# Patient Record
Sex: MALE | Race: WHITE | Employment: FULL TIME | URBAN - METROPOLITAN AREA
[De-identification: names, ages, dates, MRNs, and addresses within clinical notes are randomized per-mention and may not be internally consistent; named-entity substitution may affect disease eponyms.]

---

## 2017-09-13 ENCOUNTER — OFFICE VISIT (OUTPATIENT)
Dept: FAMILY MEDICINE | Facility: CLINIC | Age: 67
End: 2017-09-13
Payer: MEDICARE

## 2017-09-13 VITALS
BODY MASS INDEX: 31.5 KG/M2 | SYSTOLIC BLOOD PRESSURE: 123 MMHG | HEART RATE: 59 BPM | HEIGHT: 70 IN | TEMPERATURE: 96.9 F | DIASTOLIC BLOOD PRESSURE: 75 MMHG | WEIGHT: 220 LBS

## 2017-09-13 DIAGNOSIS — Z23 NEED FOR PROPHYLACTIC VACCINATION AND INOCULATION AGAINST INFLUENZA: ICD-10-CM

## 2017-09-13 DIAGNOSIS — Z00.00 ROUTINE HISTORY AND PHYSICAL EXAMINATION OF ADULT: Primary | ICD-10-CM

## 2017-09-13 LAB
CHOLEST SERPL-MCNC: 231 MG/DL
GLUCOSE SERPL-MCNC: 97 MG/DL (ref 70–99)
HDLC SERPL-MCNC: 38 MG/DL
LDLC SERPL CALC-MCNC: 137 MG/DL
NONHDLC SERPL-MCNC: 193 MG/DL
TRIGL SERPL-MCNC: 278 MG/DL

## 2017-09-13 PROCEDURE — 36415 COLL VENOUS BLD VENIPUNCTURE: CPT | Performed by: FAMILY MEDICINE

## 2017-09-13 PROCEDURE — 90662 IIV NO PRSV INCREASED AG IM: CPT | Performed by: FAMILY MEDICINE

## 2017-09-13 PROCEDURE — 80061 LIPID PANEL: CPT | Performed by: FAMILY MEDICINE

## 2017-09-13 PROCEDURE — G0438 PPPS, INITIAL VISIT: HCPCS | Performed by: FAMILY MEDICINE

## 2017-09-13 PROCEDURE — G0008 ADMIN INFLUENZA VIRUS VAC: HCPCS | Performed by: FAMILY MEDICINE

## 2017-09-13 PROCEDURE — 82947 ASSAY GLUCOSE BLOOD QUANT: CPT | Performed by: FAMILY MEDICINE

## 2017-09-13 NOTE — PROGRESS NOTES
SUBJECTIVE:   David lCark is a 67 year old male who presents for Preventive Visit.    Are you in the first 12 months of your Medicare Part B coverage?  No    Healthy Habits:    Do you get at least three servings of calcium containing foods daily (dairy, green leafy vegetables, etc.)? Taking an OTC medication to supplement-Probiotic.  Milk and cheese daily.    Amount of exercise or daily activities, outside of work: Back and core exercises, weights,treadmill.  None in the past 3 weeks due to his back pain.     Problems taking medications regularly No    Medication side effects: No    Have you had an eye exam in the past two years? no    Do you see a dentist twice per year? yes    Do you have sleep apnea, excessive snoring or daytime drowsiness?no    COGNITIVE SCREEN  1) Repeat 3 items (Banana, Sunrise, Chair)    2) Clock draw: NORMAL  3) 3 item recall: Recalls 1 object   Results: NORMAL clock, 1-2 items recalled: COGNITIVE IMPAIRMENT LESS LIKELY    Mini-CogTM Copyright S Corey. Licensed by the author for use in [facility]; reprinted with permission (travis@[email or web address]). All rights reserved.        Chief Complaint   Patient presents with     Physical     Medicare wellness exam.     Lipids     He is fasting today for labs if needed.     Reviewed and updated as needed this visit by clinical staff  Tobacco  Allergies  Med Hx  Surg Hx  Fam Hx  Soc Hx      Reviewed and updated as needed this visit by Provider      Current Outpatient Prescriptions:      NEW MED, Taking OTC medications that include Apple Vinegar, Med for his Sinus, Gummy Bears., Disp: , Rfl:      ketoconazole (NIZORAL) 2 % shampoo, Apply to the affected area and wash off after 5 minutes daily for first 2 weeks., Disp: 240 mL, Rfl: 11     econazole nitrate 1 % cream, Apply topically 2 times daily, Disp: 60 g, Rfl: 3     sildenafil (VIAGRA) 100 MG tablet, Take 0.5-1 tablets ( mg) by mouth daily as needed for erectile dysfunction Take 30  min to 4 hours before intercourse., Disp: 6 tablet, Rfl: 11     ibuprofen (ADVIL,MOTRIN) 200 MG tablet, Take 400 mg by mouth every 4 hours as needed for mild pain, Disp: , Rfl:      Glucosamine-Chondroit-Vit C-Mn (GLUCOSAMINE 1500 COMPLEX) CAPS, Take  by mouth., Disp: , Rfl:      aspirin 81 MG tablet, Take 81 mg by mouth daily Patient takes 1 tablet every other day, Disp: 30 tablet, Rfl:      Multiple Vitamin (MULTI-VITAMIN) per tablet, Take 1 tablet by mouth daily., Disp: , Rfl:      fish oil-omega-3 fatty acids (FISH OIL) 1000 MG capsule, Take 2-4 capsules by mouth daily., Disp: , Rfl:           Social History   Substance Use Topics     Smoking status: Never Smoker     Smokeless tobacco: Never Used     Alcohol use Yes      Comment: occassionally       The patient does not drink >3 drinks per day nor >7 drinks per week.    Today's PHQ-2 Score:   PHQ-2 ( 1999 Pf[employer]) 9/13/2017 7/29/2016   Q1: Little interest or pleasure in doing things 0 1   Q2: Feeling down, depressed or hopeless 0 1   PHQ-2 Score 0 2       Do you feel safe in your environment - Yes    Do you have a Health Care Directive?: Yes: Advance Directive has been received and scanned.    Current providers sharing in care for this patient include:   Patient Care Team:  Gal Robison MD as PCP - General (Family Practice)      Hearing impairment: No    Ability to successfully perform activities of daily living: Yes, no assistance needed     Fall risk:  Fallen 2 or more times in the past year?: Yes  Any fall with injury in the past year?: No  Timed Up and Go Test (>13.5 is fall risk; contact physician) : 9      Home safety:  lack of grab bars in the bathroom      The following health maintenance items are reviewed in Epic and correct as of today:  Health Maintenance   Topic Date Due     HEPATITIS C SCREENING  07/31/1969     FALL RISK ASSESSMENT  07/31/2016     AORTIC ANEURYSM SCREENING (SYSTEM ASSIGNED)  07/31/2016     PNEUMOCOCCAL (2 of 2 -  "PPSV23) 07/29/2017     INFLUENZA VACCINE (SYSTEM ASSIGNED)  09/01/2017     ADVANCE DIRECTIVE PLANNING Q5 YRS  05/29/2018     TETANUS IMMUNIZATION (SYSTEM ASSIGNED)  09/23/2020     LIPID SCREEN Q5 YR MALE (SYSTEM ASSIGNED)  07/29/2021     COLON CANCER SCREEN (SYSTEM ASSIGNED)  08/16/2026     Labs reviewed in EPIC    ROS:  C: NEGATIVE for fever, chills, change in weight  I: NEGATIVE for worrisome rashes, moles or lesions  E: NEGATIVE for vision changes or irritation  E/M: NEGATIVE for ear, mouth and throat problems  R: NEGATIVE for significant cough or SOB  B: NEGATIVE for masses, tenderness or discharge  CV: NEGATIVE for chest pain, palpitations or peripheral edema  GI: NEGATIVE for nausea, abdominal pain, heartburn, or change in bowel habits  : NEGATIVE for frequency, dysuria, or hematuria  M: NEGATIVE for significant arthralgias or myalgia  N: NEGATIVE for weakness, dizziness or paresthesias  E: NEGATIVE for temperature intolerance, skin/hair changes  H: NEGATIVE for bleeding problems  P: NEGATIVE for changes in mood or affect    OBJECTIVE:   /75  Pulse 59  Temp 96.9  F (36.1  C) (Tympanic)  Ht 5' 9.75\" (1.772 m)  Wt 220 lb (99.8 kg)  BMI 31.79 kg/m2 Estimated body mass index is 31.79 kg/(m^2) as calculated from the following:    Height as of this encounter: 5' 9.75\" (1.772 m).    Weight as of this encounter: 220 lb (99.8 kg).GENERAL APPEARANCE: healthy, alert and no distress  EYES: EOMI,  PERRL  HENT: ear canals and TM's normal and nose and mouth without ulcers or lesions  NECK: no adenopathy, no asymmetry, masses, or scars and thyroid normal to palpation  RESP: lungs clear to auscultation - no rales, rhonchi or wheezes  CV: regular rates and rhythm, normal S1 S2, no S3 or S4 and no murmur, click or rub -  ABDOMEN:  soft, nontender, no HSM or masses and bowel sounds normal  GU_male: testicles normal without atrophy or masses, no hernias and penis normal without urethral discharge  MS: extremities " "normal- no gross deformities noted, no evidence of inflammation in joints, FROM in all extremities.  SKIN: no suspicious lesions or rashes  NEURO: Normal strength and tone, sensory exam grossly normal, mentation intact and speech normal  PSYCH: mentation appears normal and affect normal/bright  LYMPHATICS: No axillary, cervical, inguinal, or supraclavicular nodes      ASSESSMENT / PLAN:   1. Routine history and physical examination of adult  - NEW MED; Taking OTC medications that include Apple Vinegar, Med for his Sinus, Gummy Bears.  - Glucose  - Lipid panel reflex to direct LDL    End of Life Planning:  Patient currently has an advanced directive: Yes.  Practitioner is supportive of decision.  COUNSELING:  Reviewed preventive health counseling, as reflected in patient instructions       Regular exercise       Healthy diet/nutrition       Vision screening       Hearing screening  Estimated body mass index is 31.79 kg/(m^2) as calculated from the following:    Height as of this encounter: 5' 9.75\" (1.772 m).    Weight as of this encounter: 220 lb (99.8 kg).     reports that he has never smoked. He has never used smokeless tobacco.    Appropriate preventive services were discussed with this patient, including applicable screening as appropriate for cardiovascular disease, diabetes, osteopenia/osteoporosis, and glaucoma.  As appropriate for age/gender, discussed screening for colorectal cancer, prostate cancer, breast cancer, and cervical cancer. Checklist reviewing preventive services available has been given to the patient.    Reviewed patients plan of care and provided an AVS. The Basic Care Plan (routine screening as documented in Health Maintenance) for David meets the Care Plan requirement. This Care Plan has been established and reviewed with the Patient.    Counseling Resources:  ATP IV Guidelines  Pooled Cohorts Equation Calculator  Breast Cancer Risk Calculator  FRAX Risk Assessment  ICSI Preventive " Guidelines  Dietary Guidelines for Americans, 2010  USDA's MyPlate  ASA Prophylaxis  Lung CA Screening    Steve Patterson MD  [facility]

## 2017-09-13 NOTE — PROGRESS NOTES
Injectable Influenza Immunization Documentation    1.  Are you sick today? (Fever of 100.5 or higher on the day of the clinic)   No    2.  Have you ever had Guillain-[place] Syndrome within 6 weeks of an influenza vaccionation?  No    3. Do you have a life-threatening allergy to eggs?  No    4. Do you have a life-threatening allergy to a component of the vaccine? May include antibiotics, gelatin or latex.  No     5. Have you ever had a reaction to a dose of flu vaccine that needed immediate medical attention?  No     Form completed by Aura Santana CMA

## 2017-09-13 NOTE — LETTER
September 14, 2017      David Clark  [address] [address] [address]        Dear ,    We are writing to inform you of your test results.    The glucose is now normal and the lipids are about the same, being mildly high.   Stay on the lower carb and lipid diets.    Component      Latest Ref Rng & Units 9/13/2017   Cholesterol      <200 mg/dL 231 (H)   Triglycerides      <150 mg/dL 278 (H)   HDL Cholesterol      >39 mg/dL 38 (L)   LDL Cholesterol Calculated      <100 mg/dL 137 (H)   Non HDL Cholesterol      <130 mg/dL 193 (H)   Glucose      70 - 99 mg/dL 97     If you have any questions or concerns, please call the clinic at the number listed above.       Sincerely,        Steve Patterson MD

## 2017-09-13 NOTE — NURSING NOTE
"Chief Complaint   Patient presents with     Physical     Medicare wellness exam.     Lipids     He is fasting today for labs if needed.       Initial /75  Pulse 59  Temp 96.9  F (36.1  C) (Tympanic)  Ht 5' 9.75\" (1.772 m)  Wt 220 lb (99.8 kg)  BMI 31.79 kg/m2 Estimated body mass index is 31.79 kg/(m^2) as calculated from the following:    Height as of this encounter: 5' 9.75\" (1.772 m).    Weight as of this encounter: 220 lb (99.8 kg).  Medication Reconciliation: complete  "

## 2017-09-13 NOTE — MR AVS SNAPSHOT
After Visit Summary   9/13/2017    David Clark    MRN: [id number]           Patient Information     Date Of Birth          1950        Visit Information        Provider Department      9/13/2017 10:00 AM Steve Patterson MD [facility]        Today's Diagnoses     Routine history and physical examination of adult    -  1      Care Instructions      Preventive Health Recommendations:   Male Ages 65 and over    Yearly exam:             See your health care provider every year in order to  o   Review health changes.   o   Discuss preventive care.    o   Review your medicines if your doctor has prescribed any.    Talk with your health care provider about whether you should have a test to screen for prostate cancer (PSA).    Every 3 years, have a diabetes test (fasting glucose). If you are at risk for diabetes, you should have this test more often.    Every 5 years, have a cholesterol test. Have this test more often if you are at risk for high cholesterol or heart disease.     Every 10 years, have a colonoscopy. Or, have a yearly FIT test (stool test). These exams will check for colon cancer.    Talk to with your health care provider about screening for Abdominal Aortic Aneurysm if you have a family history of AAA or have a history of smoking.    Shots:     Get a flu shot each year.     Get a tetanus shot every 10 years.     Talk to your doctor about your pneumonia vaccines. There are now two you should receive - Pneumovax (PPSV 23) and Prevnar (PCV 13).     Talk to your doctor about a shingles vaccine.     Talk to your doctor about the hepatitis B vaccine.  Nutrition:     Eat at least 5 servings of fruits and vegetables each day.     Eat whole-grain bread, whole-wheat pasta and brown rice instead of white grains and rice.     Talk to your provider about Calcium and Vitamin D.   Lifestyle    Exercise for at least 150 minutes a week (30 minutes a day, 5 days a week). This will help  "you control your weight and prevent disease.     Limit alcohol to one drink per day.     No smoking.     Wear sunscreen to prevent skin cancer.     See your dentist every six months for an exam and cleaning.     See your eye doctor every 1 to 2 years to screen for conditions such as glaucoma, macular degeneration, cataracts, etc           Thank you for choosing [facility].  You may be receiving a survey in the mail from [employer] regarding your visit today.  Please take a few minutes to complete and return the survey to let us know how we are doing.      If you have questions or concerns, please contact us via [employer] or you can contact your care team at [phone].    Our Clinic hours are:  Monday 6:40 am  to 7:00 pm  Tuesday -Friday 6:40 am to 5:00 pm    The [place] outpatient lab hours are:  Monday - Friday 6:10 am to 4:45 pm  Saturdays 7:00 am to 11:00 am  Appointments are required, call [phone]    If you have clinical questions after hours or would like to schedule an appointment,  call the clinic at [phone].    ASSESSMENT / PLAN:   1. Routine history and physical examination of adult  - NEW MED; Taking OTC medications that include Apple Vinegar, Med for his Sinus, Gummy Bears.  - Glucose  - Lipid panel reflex to direct LDL    End of Life Planning:  Patient currently has an advanced directive: Yes.  Practitioner is supportive of decision.  COUNSELING:  Reviewed preventive health counseling, as reflected in patient instructions       Regular exercise       Healthy diet/nutrition       Vision screening       Hearing screening  Estimated body mass index is 31.79 kg/(m^2) as calculated from the following:    Height as of this encounter: 5' 9.75\" (1.772 m).    Weight as of this encounter: 220 lb (99.8 kg).     reports that he has never smoked. He has never used smokeless tobacco.    Appropriate preventive services were discussed with this patient, including applicable screening as appropriate for " cardiovascular disease, diabetes, osteopenia/osteoporosis, and glaucoma.  As appropriate for age/gender, discussed screening for colorectal cancer, prostate cancer, breast cancer, and cervical cancer. Checklist reviewing preventive services available has been given to the patient.    Reviewed patients plan of care and provided an AVS. The Basic Care Plan (routine screening as documented in Health Maintenance) for David meets the Care Plan requirement. This Care Plan has been established and reviewed with the Patient.    Counseling Resources:  ATP IV Guidelines  Pooled Cohorts Equation Calculator  Breast Cancer Risk Calculator  FRAX Risk Assessment  ICSI Preventive Guidelines  Dietary Guidelines for Americans, 2010  USDA's MyPlate  ASA Prophylaxis  Lung CA Screening            Follow-ups after your visit        Who to contact     If you have questions or need follow up information about today's clinic visit or your schedule please contact [facility] directly at [phone].  Normal or non-critical lab and imaging results will be communicated to you by [employer], letter or phone within 4 business days after the clinic has received the results. If you do not hear from us within 7 days, please contact the clinic through [employer] or phone. If you have a critical or abnormal lab result, we will notify you by phone as soon as possible.  Submit refill requests through [employer] or call your pharmacy and they will forward the refill request to us. Please allow 3 business days for your refill to be completed.          Additional Information About Your Visit        [employer] Information     [employer] gives you secure access to your electronic health record. If you see a primary care provider, you can also send messages to your care team and make appointments. If you have questions, please call your primary care clinic.  If you do not have a primary care provider, please call [phone] and they will assist you.       "  Care EveryWhere ID     This is your Care EveryWhere ID. This could be used by other organizations to access your [place] medical records  [id number]        Your Vitals Were     Pulse Temperature Height BMI (Body Mass Index)          59 96.9  F (36.1  C) (Tympanic) 5' 9.75\" (1.772 m) 31.79 kg/m2         Blood Pressure from Last 3 Encounters:   09/13/17 123/75   09/26/16 126/74   08/16/16 110/66    Weight from Last 3 Encounters:   09/13/17 220 lb (99.8 kg)   08/16/16 219 lb (99.3 kg)   07/29/16 220 lb (99.8 kg)              We Performed the Following     Glucose     Lipid panel reflex to direct LDL        Primary Care Provider Office Phone # Fax #    Gal Robison [phone] [address] [facility] [address]        Equal Access to Services     KENDAL BUSCH : Hadii graciela Chawla, wanatalia gonzalez, qaybleroy kaalmada destin, iban frye . So [facility] [phone].    ATENCIÓN: Si zulmala español, tiene a delgadillo disposición servicios gratuitos de asistencia lingüística. Llame al [phone].    We comply with applicable federal civil rights laws and [place] laws. We do not discriminate on the basis of race, color, national origin, age, disability sex, sexual orientation or gender identity.            Thank you!     Thank you for choosing [facility]  for your care. Our goal is always to provide you with excellent care. Hearing back from our patients is one way we can continue to improve our services. Please take a few minutes to complete the written survey that you may receive in the mail after your visit with us. Thank you!             Your Updated Medication List - Protect others around you: Learn how to safely use, store and throw away your medicines at www.disposemymeds.org.          This list is accurate as of: 9/13/17 11:12 AM.  Always use your most recent med list.                   Brand Name Dispense Instructions " for use Diagnosis    aspirin 81 MG tablet     30 tablet    Take 81 mg by mouth daily Patient takes 1 tablet every other day        econazole nitrate 1 % cream     60 g    Apply topically 2 times daily    TV (tinea versicolor)       fish oil-omega-3 fatty acids 1000 MG capsule      Take 2-4 capsules by mouth daily.        GLUCOSAMINE 1500 COMPLEX Caps      Take  by mouth.        ibuprofen 200 MG tablet    ADVIL/MOTRIN     Take 400 mg by mouth every 4 hours as needed for mild pain        ketoconazole 2 % shampoo    NIZORAL    240 mL    Apply to the affected area and wash off after 5 minutes daily for first 2 weeks.    TV (tinea versicolor)       Multi-vitamin Tabs tablet   Generic drug:  multivitamin, therapeutic with minerals      Take 1 tablet by mouth daily.        NEW MED      Taking OTC medications that include Apple Vinegar, Med for his Sinus, Gummy Bears.    Routine history and physical examination of adult       sildenafil 100 MG cap/tab    REVATIO/VIAGRA    6 tablet    Take 0.5-1 tablets ( mg) by mouth daily as needed for erectile dysfunction Take 30 min to 4 hours before intercourse.    Erectile dysfunction, unspecified erectile dysfunction type

## 2017-09-13 NOTE — PATIENT INSTRUCTIONS
Preventive Health Recommendations:   Male Ages 65 and over    Yearly exam:             See your health care provider every year in order to  o   Review health changes.   o   Discuss preventive care.    o   Review your medicines if your doctor has prescribed any.    Talk with your health care provider about whether you should have a test to screen for prostate cancer (PSA).    Every 3 years, have a diabetes test (fasting glucose). If you are at risk for diabetes, you should have this test more often.    Every 5 years, have a cholesterol test. Have this test more often if you are at risk for high cholesterol or heart disease.     Every 10 years, have a colonoscopy. Or, have a yearly FIT test (stool test). These exams will check for colon cancer.    Talk to with your health care provider about screening for Abdominal Aortic Aneurysm if you have a family history of AAA or have a history of smoking.    Shots:     Get a flu shot each year.     Get a tetanus shot every 10 years.     Talk to your doctor about your pneumonia vaccines. There are now two you should receive - Pneumovax (PPSV 23) and Prevnar (PCV 13).     Talk to your doctor about a shingles vaccine.     Talk to your doctor about the hepatitis B vaccine.  Nutrition:     Eat at least 5 servings of fruits and vegetables each day.     Eat whole-grain bread, whole-wheat pasta and brown rice instead of white grains and rice.     Talk to your provider about Calcium and Vitamin D.   Lifestyle    Exercise for at least 150 minutes a week (30 minutes a day, 5 days a week). This will help you control your weight and prevent disease.     Limit alcohol to one drink per day.     No smoking.     Wear sunscreen to prevent skin cancer.     See your dentist every six months for an exam and cleaning.     See your eye doctor every 1 to 2 years to screen for conditions such as glaucoma, macular degeneration, cataracts, etc           Thank you for choosing [facility].  You  "may be receiving a survey in the mail from [employer] regarding your visit today.  Please take a few minutes to complete and return the survey to let us know how we are doing.      If you have questions or concerns, please contact us via [employer] or you can contact your care team at [phone].    Our Clinic hours are:  Monday 6:40 am  to 7:00 pm  Tuesday -Friday 6:40 am to 5:00 pm    The [place] outpatient lab hours are:  Monday - Friday 6:10 am to 4:45 pm  Saturdays 7:00 am to 11:00 am  Appointments are required, call [phone]    If you have clinical questions after hours or would like to schedule an appointment,  call the clinic at [phone].    ASSESSMENT / PLAN:   1. Routine history and physical examination of adult  - NEW MED; Taking OTC medications that include Apple Vinegar, Med for his Sinus, Gummy Bears.  - Glucose  - Lipid panel reflex to direct LDL    End of Life Planning:  Patient currently has an advanced directive: Yes.  Practitioner is supportive of decision.  COUNSELING:  Reviewed preventive health counseling, as reflected in patient instructions       Regular exercise       Healthy diet/nutrition       Vision screening       Hearing screening  Estimated body mass index is 31.79 kg/(m^2) as calculated from the following:    Height as of this encounter: 5' 9.75\" (1.772 m).    Weight as of this encounter: 220 lb (99.8 kg).     reports that he has never smoked. He has never used smokeless tobacco.    Appropriate preventive services were discussed with this patient, including applicable screening as appropriate for cardiovascular disease, diabetes, osteopenia/osteoporosis, and glaucoma.  As appropriate for age/gender, discussed screening for colorectal cancer, prostate cancer, breast cancer, and cervical cancer. Checklist reviewing preventive services available has been given to the patient.    Reviewed patients plan of care and provided an AVS. The Basic Care Plan (routine screening as " documented in Health Maintenance) for David meets the Care Plan requirement. This Care Plan has been established and reviewed with the Patient.    Counseling Resources:  ATP IV Guidelines  Pooled Cohorts Equation Calculator  Breast Cancer Risk Calculator  FRAX Risk Assessment  ICSI Preventive Guidelines  Dietary Guidelines for Americans, 2010  USDA's MyPlate  ASA Prophylaxis  Lung CA Screening

## 2018-06-27 ENCOUNTER — OFFICE VISIT (OUTPATIENT)
Dept: FAMILY MEDICINE | Facility: CLINIC | Age: 68
End: 2018-06-27
Payer: MEDICARE

## 2018-06-27 VITALS
DIASTOLIC BLOOD PRESSURE: 70 MMHG | BODY MASS INDEX: 31.42 KG/M2 | WEIGHT: 219.5 LBS | HEART RATE: 62 BPM | RESPIRATION RATE: 14 BRPM | HEIGHT: 70 IN | TEMPERATURE: 97.5 F | SYSTOLIC BLOOD PRESSURE: 102 MMHG

## 2018-06-27 DIAGNOSIS — R41.3 MEMORY LOSS: Primary | ICD-10-CM

## 2018-06-27 PROCEDURE — 99214 OFFICE O/P EST MOD 30 MIN: CPT | Performed by: FAMILY MEDICINE

## 2018-06-27 NOTE — PROGRESS NOTES
"  SUBJECTIVE:   David Clark is a 67 year old male who presents to clinic today for the following health issues:      MEMORY ISSUES      Duration: Patient's wife Lelia believes he has been \"slower\" in the last year.      Description (location/character/radiation): Had an incident on 6/9/18 while attending a friend's wedding, where is response to a question was not appropriate.     Intensity:      Accompanying signs and symptoms: he falls asleep more easily during the day. His wife has noted this occurring more often. He has been sleepwalking and talking more in his sleep. He does snore and stop breathing occasionally. He has an appt in [facility] with a sleep specialist and a possible sleep study.     History (similar episodes/previous evaluation): Patient here today to discuss memory issues.  Patient actually \"blacked out\" or disengaged on 6/9/18 at a wedding.  Wife (Lelia) says he does fall asleep a lot.  Wife thinks he is becoming forgetful.Patient states also today his left arm was numb, tingling for one week (March 2018).  Had weakness in left arm.    Precipitating or alleviating factors:     Therapies tried and outcome: None       Current Outpatient Prescriptions:      aspirin 81 MG tablet, Take 81 mg by mouth daily Patient takes 1 tablet every other day, Disp: 30 tablet, Rfl:      fish oil-omega-3 fatty acids (FISH OIL) 1000 MG capsule, Take 2-4 capsules by mouth daily., Disp: , Rfl:      Glucosamine-Chondroit-Vit C-Mn (GLUCOSAMINE 1500 COMPLEX) CAPS, Take  by mouth., Disp: , Rfl:      Multiple Vitamin (MULTI-VITAMIN) per tablet, Take 1 tablet by mouth daily., Disp: , Rfl:      NEW MED, Taking OTC medications that include Apple Vinegar, Med for his Sinus, Gummy Bears., Disp: , Rfl:      econazole nitrate 1 % cream, Apply topically 2 times daily, Disp: 60 g, Rfl: 3     ibuprofen (ADVIL,MOTRIN) 200 MG tablet, Take 400 mg by mouth every 4 hours as needed for mild pain, Disp: , Rfl:      ketoconazole " "(NIZORAL) 2 % shampoo, Apply to the affected area and wash off after 5 minutes daily for first 2 weeks., Disp: 240 mL, Rfl: 11     sildenafil (VIAGRA) 100 MG tablet, Take 0.5-1 tablets ( mg) by mouth daily as needed for erectile dysfunction Take 30 min to 4 hours before intercourse., Disp: 6 tablet, Rfl: 11    Patient Active Problem List   Diagnosis     Tinea versicolor     Thyroid nodule     Advanced directives, counseling/discussion     Erectile dysfunction, unspecified erectile dysfunction type     Prediabetes     Fatigue, unspecified type       Blood pressure 102/70, pulse 62, temperature 97.5  F (36.4  C), temperature source Tympanic, resp. rate 14, height 5' 9.76\" (1.772 m), weight 219 lb 8 oz (99.6 kg).    Exam:  GENERAL APPEARANCE: healthy, alert and no distress  EYES: EOMI,  PERRL  HENT: ear canals and TM's normal and nose and mouth without ulcers or lesions  NECK: no adenopathy, no asymmetry, masses, or scars and thyroid normal to palpation  RESP: lungs clear to auscultation - no rales, rhonchi or wheezes  CV: regular rates and rhythm, normal S1 S2, no S3 or S4 and no murmur, click or rub -  SKIN: no suspicious lesions or rashes  NEURO: Normal strength and tone, sensory exam grossly normal, mentation intact and speech normal;  Romberg and gait are normal. CN 3-12 normal. FNF and CANDACE are normal. DTR are normal.   PSYCH: mentation appears normal and affect normal/bright  LYMPHATICS: No axillary, cervical, inguinal, or supraclavicular nodes      (R41.3) Memory loss  (primary encounter diagnosis)  Comment:   Plan: **TSH with free T4 reflex FUTURE anytime, **CBC        with platelets FUTURE anytime, **Comprehensive         metabolic panel FUTURE anytime        We discussed the possible cause of the symptoms as sleep apnea, and follow up with the sleep evaluation and possible sleep study.   Avoid stimulants. Elevate the head of the bed. If the sleep is not the issue, then the labs are ordered and call for " the lab appt at [phone].   We will call the results if these are normal then the next steps are an MRI brain and the Neurology appt.     Steve Patterson

## 2018-06-27 NOTE — NURSING NOTE
"Chief Complaint   Patient presents with     Memory Loss     Patient here today to discuss memory issues.  Patient actually \"blacked out\" or disengaged on 6/9/18 at a wedding.  Wife (Lelia) says he does fall asleep a lot.     Musculoskeletal Problem     Patient states also today his left arm was numb, tingling for one week (March 2018).  Had weakness in left arm.       Initial /70  Pulse 62  Temp 97.5  F (36.4  C) (Tympanic)  Resp 14  Ht 5' 9.76\" (1.772 m)  Wt 219 lb 8 oz (99.6 kg)  BMI 31.71 kg/m2 Estimated body mass index is 31.71 kg/(m^2) as calculated from the following:    Height as of this encounter: 5' 9.76\" (1.772 m).    Weight as of this encounter: 219 lb 8 oz (99.6 kg).    Medication Reconciliation:  complete    Bonny Bardales CMA (AAMA)  "

## 2018-06-27 NOTE — PATIENT INSTRUCTIONS
(R41.3) Memory loss  (primary encounter diagnosis)  Comment:   Plan: **TSH with free T4 reflex FUTURE anytime, **CBC        with platelets FUTURE anytime, **Comprehensive         metabolic panel FUTURE anytime        We discussed the possible cause of the symptoms as sleep apnea, and follow up with the sleep evaluation and possible sleep study.   Avoid stimulants. Elevate the head of the bed. If the sleep is not the issue, then the labs are ordered and call for the lab appt at [phone].   We will call the results if these are normal then the next steps are an MRI brain and the Neurology appt.

## 2018-06-27 NOTE — MR AVS SNAPSHOT
After Visit Summary   6/27/2018    David Clark    MRN: [id number]           Patient Information     Date Of Birth          1950        Visit Information        Provider Department      6/27/2018 10:00 AM Steve Patterson MD [facility]        Today's Diagnoses     Memory loss    -  1      Care Instructions    (R41.3) Memory loss  (primary encounter diagnosis)  Comment:   Plan: **TSH with free T4 reflex FUTURE anytime, **CBC        with platelets FUTURE anytime, **Comprehensive         metabolic panel FUTURE anytime        We discussed the possible cause of the symptoms as sleep apnea, and follow up with the sleep evaluation and possible sleep study.   Avoid stimulants. Elevate the head of the bed. If the sleep is not the issue, then the labs are ordered and call for the lab appt at [phone].   We will call the results if these are normal then the next steps are an MRI brain and the Neurology appt.           Follow-ups after your visit        Additional Services     NEUROLOGY ADULT REFERRAL       Your provider has referred you for the following:   Consult at [facility]: [facility] [phone]   [email or web address]    Please be aware that coverage of these services is subject to the terms and limitations of your health insurance plan.  Call member services at your health plan with any benefit or coverage questions.      Please bring the following with you to your appointment:    (1) Any X-Rays, CTs or MRIs which have been performed.  Contact the facility where they were done to arrange for  prior to your scheduled appointment.    (2) List of current medications  (3) This referral request   (4) Any documents/labs given to you for this referral                  Your next 10 appointments already scheduled     Jul 10, 2018 11:00 AM CDT   New Sleep Patient with ARTUR Mejia   [facility]  "[facility]    [address]  [facility] [address]   [phone]              Future tests that were ordered for you today     Open Future Orders        Priority Expected Expires Ordered    **TSH with free T4 reflex FUTURE anytime Routine 6/27/2018 6/27/2019 6/27/2018    **CBC with platelets FUTURE anytime Routine 6/27/2018 6/27/2019 6/27/2018    **Comprehensive metabolic panel FUTURE anytime Routine 6/27/2018 6/27/2019 6/27/2018            Who to contact     If you have questions or need follow up information about today's clinic visit or your schedule please contact [facility] directly at [phone].  Normal or non-critical lab and imaging results will be communicated to you by [employer], letter or phone within 4 business days after the clinic has received the results. If you do not hear from us within 7 days, please contact the clinic through [employer] L[employer] or phone. If you have a critical or abnormal lab result, we will notify you by phone as soon as possible.  Submit refill requests through ilab or call your pharmacy and they will forward the refill request to us. Please allow 3 business days for your refill to be completed.          Additional Information About Your Visit        ilab Information     ilab gives you secure access to your electronic health record. If you see a primary care provider, you can also send messages to your care team and make appointments. If you have questions, please call your primary care clinic.  If you do not have a primary care provider, please call [phone] and they will assist you.        Care EveryWhere ID     This is your Care EveryWhere ID. This could be used by other organizations to access your [facility] medical records  [id number]        Your Vitals Were     Pulse Temperature Respirations Height BMI (Body Mass Index)       62 97.5  F (36.4  C) (Tympanic) 14 5' 9.76\" (1.772 m) 31.71 kg/m2        Blood Pressure from Last 3 Encounters:   06/27/18 " 102/70   09/13/17 123/75   09/26/16 126/74    Weight from Last 3 Encounters:   06/27/18 219 lb 8 oz (99.6 kg)   09/13/17 220 lb (99.8 kg)   08/16/16 219 lb (99.3 kg)              We Performed the Following     NEUROLOGY ADULT REFERRAL        Primary Care Provider Office Phone # Fax #    Gal Felix Robison [phone] [address] [address]        Equal Access to Services     KENDAL BUSCH : Hadii aad ku hadasho Soomaali, waaxda luqadaha, qaybta kaalmada adekrystalyapaige, iban frye . So [facility] [phone].    ATENCIÓN: Si habla español, tiene a delgadillo disposición servicios gratuitos de asistencia lingüística. Llame al [phone].    We comply with applicable federal civil rights laws and [place] laws. We do not discriminate on the basis of race, color, national origin, age, disability, sex, sexual orientation, or gender identity.            Thank you!     Thank you for choosing [facility]  for your care. Our goal is always to provide you with excellent care. Hearing back from our patients is one way we can continue to improve our services. Please take a few minutes to complete the written survey that you may receive in the mail after your visit with us. Thank you!             Your Updated Medication List - Protect others around you: Learn how to safely use, store and throw away your medicines at www.disposemymeds.org.          This list is accurate as of 6/27/18 11:00 AM.  Always use your most recent med list.                   Brand Name Dispense Instructions for use Diagnosis    aspirin 81 MG tablet     30 tablet    Take 81 mg by mouth daily Patient takes 1 tablet every other day        econazole nitrate 1 % cream     60 g    Apply topically 2 times daily    TV (tinea versicolor)       fish oil-omega-3 fatty acids 1000 MG capsule      Take 2-4 capsules by mouth daily.        GLUCOSAMINE 1500 COMPLEX Caps      Take  by  mouth.        ibuprofen 200 MG tablet    ADVIL/MOTRIN     Take 400 mg by mouth every 4 hours as needed for mild pain        ketoconazole 2 % shampoo    NIZORAL    240 mL    Apply to the affected area and wash off after 5 minutes daily for first 2 weeks.    TV (tinea versicolor)       Multi-vitamin Tabs tablet   Generic drug:  multivitamin, therapeutic with minerals      Take 1 tablet by mouth daily.        NEW MED      Taking OTC medications that include Apple Vinegar, Med for his Sinus, Gummy Bears.    Routine history and physical examination of adult       sildenafil 100 MG tablet    VIAGRA    6 tablet    Take 0.5-1 tablets ( mg) by mouth daily as needed for erectile dysfunction Take 30 min to 4 hours before intercourse.    Erectile dysfunction, unspecified erectile dysfunction type

## 2018-07-10 ENCOUNTER — OFFICE VISIT (OUTPATIENT)
Dept: SLEEP MEDICINE | Facility: CLINIC | Age: 68
End: 2018-07-10
Payer: MEDICARE

## 2018-07-10 VITALS
DIASTOLIC BLOOD PRESSURE: 68 MMHG | HEART RATE: 67 BPM | OXYGEN SATURATION: 97 % | HEIGHT: 70 IN | SYSTOLIC BLOOD PRESSURE: 111 MMHG | WEIGHT: 218 LBS | BODY MASS INDEX: 31.21 KG/M2

## 2018-07-10 DIAGNOSIS — E66.09 CLASS 1 OBESITY DUE TO EXCESS CALORIES WITH BODY MASS INDEX (BMI) OF 31.0 TO 31.9 IN ADULT, UNSPECIFIED WHETHER SERIOUS COMORBIDITY PRESENT: ICD-10-CM

## 2018-07-10 DIAGNOSIS — R53.83 MALAISE AND FATIGUE: ICD-10-CM

## 2018-07-10 DIAGNOSIS — R41.3 MEMORY CHANGES: ICD-10-CM

## 2018-07-10 DIAGNOSIS — R53.81 MALAISE AND FATIGUE: ICD-10-CM

## 2018-07-10 DIAGNOSIS — R06.00 DYSPNEA AND RESPIRATORY ABNORMALITY: Primary | ICD-10-CM

## 2018-07-10 DIAGNOSIS — R06.89 DYSPNEA AND RESPIRATORY ABNORMALITY: Primary | ICD-10-CM

## 2018-07-10 DIAGNOSIS — G47.30 SLEEP APNEA, UNSPECIFIED TYPE: ICD-10-CM

## 2018-07-10 DIAGNOSIS — Z72.820 LACK OF ADEQUATE SLEEP: ICD-10-CM

## 2018-07-10 DIAGNOSIS — E66.811 CLASS 1 OBESITY DUE TO EXCESS CALORIES WITH BODY MASS INDEX (BMI) OF 31.0 TO 31.9 IN ADULT, UNSPECIFIED WHETHER SERIOUS COMORBIDITY PRESENT: ICD-10-CM

## 2018-07-10 PROCEDURE — 99204 OFFICE O/P NEW MOD 45 MIN: CPT | Performed by: PHYSICIAN ASSISTANT

## 2018-07-10 RX ORDER — ZOLPIDEM TARTRATE 5 MG/1
TABLET ORAL
Qty: 1 TABLET | Refills: 0 | Status: SHIPPED | OUTPATIENT
Start: 2018-07-10 | End: 2018-09-14

## 2018-07-10 NOTE — PATIENT INSTRUCTIONS

## 2018-07-10 NOTE — NURSING NOTE
"Chief Complaint   Patient presents with     Consult For     Consult per Dr. Patterson RE: Memory issues snoring,occ witnessed apneas.       Initial /68  Pulse 67  Ht 1.772 m (5' 9.75\")  Wt 98.9 kg (218 lb)  SpO2 97%  BMI 31.5 kg/m2 Estimated body mass index is 31.5 kg/(m^2) as calculated from the following:    Height as of this encounter: 1.772 m (5' 9.75\").    Weight as of this encounter: 98.9 kg (218 lb).    Medication Reconciliation: complete    Neck circumference: 44.5 inches / 17.5 centimeters.    DME: none  "

## 2018-07-10 NOTE — MR AVS SNAPSHOT
After Visit Summary   7/10/2018    David Clark    MRN: [id number]           Patient Information     Date Of Birth          1950        Visit Information        Provider Department      7/10/2018 11:00 AM Melanie Marinelli PA [facility]        Today's Diagnoses     Dyspnea and respiratory abnormality    -  1    Memory changes        Class 1 obesity due to excess calories with body mass index (BMI) of 31.0 to 31.9 in adult, unspecified whether serious comorbidity present        Lack of adequate sleep        Sleep apnea, unspecified type        Malaise and fatigue          Care Instructions      Your BMI is Body mass index is 31.5 kg/(m^2).  Weight management is a personal decision.  If you are interested in exploring weight loss strategies, the following discussion covers the approaches that may be successful. Body mass index (BMI) is one way to tell whether you are at a healthy weight, overweight, or obese. It measures your weight in relation to your height.  A BMI of 18.5 to 24.9 is in the healthy range. A person with a BMI of 25 to 29.9 is considered overweight, and someone with a BMI of 30 or greater is considered obese. More than two-thirds of American adults are considered overweight or obese.  Being overweight or obese increases the risk for further weight gain. Excess weight may lead to heart disease and diabetes.  Creating and following plans for healthy eating and physical activity may help you improve your health.  Weight control is part of healthy lifestyle and includes exercise, emotional health, and healthy eating habits. Careful eating habits lifelong are the mainstay of weight control. Though there are significant health benefits from weight loss, long-term weight loss with diet alone may be very difficult to achieve- studies show long-term success with dietary management in less than 10% of people. Attaining a healthy weight may be especially difficult to achieve  in those with severe obesity. In some cases, medications, devices and surgical management might be considered.  What can you do?  If you are overweight or obese and are interested in methods for weight loss, you should discuss this with your provider.     Consider reducing daily calorie intake by 500 calories.     Keep a food journal.     Avoiding skipping meals, consider cutting portions instead.    Diet combined with exercise helps maintain muscle while optimizing fat loss. Strength training is particularly important for building and maintaining muscle mass. Exercise helps reduce stress, increase energy, and improves fitness. Increasing exercise without diet control, however, may not burn enough calories to loose weight.       Start walking three days a week 10-20 minutes at a time    Work towards walking thirty minutes five days a week     Eventually, increase the speed of your walking for 1-2 minutes at time    In addition, we recommend that you review healthy lifestyles and methods for weight loss available through the National Institutes of Health patient information sites:  http://win.niddk.nih.gov/publications/index.htm    And look into health and wellness programs that may be available through your health insurance provider, employer, local community center, or titi club.    Weight management plan: Patient was referred to their PCP to discuss a diet and exercise plan.            Follow-ups after your visit        Your next 10 appointments already scheduled     Jul 11, 2018  8:00 PM CDT   PSG Split with SLEEP LAB, BED THREE   [facility] ([facility])    [address]  [facility] [address]   [phone]            Jul 17, 2018 10:30 AM CDT   Return Sleep Patient with ARTUR Mejia   [facility] ([facility])    [address]  [facility] [address]   [phone]              Future tests that were ordered  "for you today     Open Future Orders        Priority Expected Expires Ordered    Comprehensive Sleep Study Routine  1/6/2019 7/10/2018            Who to contact     If you have questions or need follow up information about today's clinic visit or your schedule please contact [facility] directly at [phone].  Normal or non-critical lab and imaging results will be communicated to you by MyChart, letter or phone within 4 business days after the clinic has received the results. If you do not hear from us within 7 days, please contact the clinic through [employer] or phone. If you have a critical or abnormal lab result, we will notify you by phone as soon as possible.  Submit refill requests through [employer] or call your pharmacy and they will forward the refill request to us. Please allow 3 business days for your refill to be completed.          Additional Information About Your Visit        MyChart Information     [employer] gives you secure access to your electronic health record. If you see a primary care provider, you can also send messages to your care team and make appointments. If you have questions, please call your primary care clinic.  If you do not have a primary care provider, please call [phone] and they will assist you.        Care EveryWhere ID     This is your Care EveryWhere ID. This could be used by other organizations to access your [facility] medical records  [id number]        Your Vitals Were     Pulse Height Pulse Oximetry BMI (Body Mass Index)          67 1.772 m (5' 9.75\") 97% 31.5 kg/m2         Blood Pressure from Last 3 Encounters:   07/10/18 111/68   06/27/18 102/70   09/13/17 123/75    Weight from Last 3 Encounters:   07/10/18 98.9 kg (218 lb)   06/27/18 99.6 kg (219 lb 8 oz)   09/13/17 99.8 kg (220 lb)                 Today's Medication Changes          These changes are accurate as of 7/10/18 12:48 PM.  If you have any questions, ask your nurse or doctor.             "   Start taking these medicines.        Dose/Directions    zolpidem 5 MG tablet   Commonly known as:  AMBIEN   Started by:  Melanie Marinelli PA        Take tablet by mouth 15 minutes prior to sleep, for Sleep Study   Quantity:  1 tablet   Refills:  0            Where to get your medicines      Some of these will need a paper prescription and others can be bought over the counter.  Ask your nurse if you have questions.     Bring a paper prescription for each of these medications     zolpidem 5 MG tablet                Primary Care Provider Office Phone # Fax #    Gal Felix Robison [phone] [address] [facility] [address]        Equal Access to Services     [facility]: Hadii graciela linder hadelva Sosheryl, waaxda lufaye, qaybta kaalmada destin, iban frye . So [facility] [phone].    ATENCIÓN: Si habla español, tiene a delgadillo disposición servicios gratuitos de asistencia lingüística. LlUniversity [facility] [phone].    We comply with applicable federal civil rights laws and [place] laws. We do not discriminate on the basis of race, color, national origin, age, disability, sex, sexual orientation, or gender identity.            Thank you!     Thank you for choosing [facility]  for your care. Our goal is always to provide you with excellent care. Hearing back from our patients is one way we can continue to improve our services. Please take a few minutes to complete the written survey that you may receive in the mail after your visit with us. Thank you!             Your Updated Medication List - Protect others around you: Learn how to safely use, store and throw away your medicines at www.disposemymeds.org.          This list is accurate as of 7/10/18 12:48 PM.  Always use your most recent med list.                   Brand Name Dispense Instructions for use Diagnosis    aspirin 81 MG tablet     30 tablet    Take 81 mg by mouth daily  Patient takes 1 tablet every other day        econazole nitrate 1 % cream     60 g    Apply topically 2 times daily    TV (tinea versicolor)       fish oil-omega-3 fatty acids 1000 MG capsule      Take 2-4 capsules by mouth daily.        GLUCOSAMINE 1500 COMPLEX Caps      Take  by mouth.        ibuprofen 200 MG tablet    ADVIL/MOTRIN     Take 400 mg by mouth every 4 hours as needed for mild pain        ketoconazole 2 % shampoo    NIZORAL    240 mL    Apply to the affected area and wash off after 5 minutes daily for first 2 weeks.    TV (tinea versicolor)       Multi-vitamin Tabs tablet   Generic drug:  multivitamin, therapeutic with minerals      Take 1 tablet by mouth daily.        NEW MED      Taking OTC medications that include Apple Vinegar, Med for his Sinus, Gummy Bears.    Routine history and physical examination of adult       sildenafil 100 MG tablet    VIAGRA    6 tablet    Take 0.5-1 tablets ( mg) by mouth daily as needed for erectile dysfunction Take 30 min to 4 hours before intercourse.    Erectile dysfunction, unspecified erectile dysfunction type       zolpidem 5 MG tablet    AMBIEN    1 tablet    Take tablet by mouth 15 minutes prior to sleep, for Sleep Study

## 2018-07-10 NOTE — PROGRESS NOTES
"  Sleep Consultation:    Date on this visit: 7/10/2018    David Clark is sent by No ref. provider found for a sleep consultation regarding.    Primary Physician: Gal Robison     CC: \"memoery loss\"    HPI: David Clark is a 67 year old male with medical history remarkable for obesity. He seeking sleep evaluation in the setting of recent memory concerns and ongoing snoring.     David goes to sleep at 1:00 AM during the week. He wakes up at 8:00 AM with an alarm. He falls asleep in 5 minutes.  David denies difficulty falling asleep.  He wakes up 3 times a night for 1-2 minutes before falling back to sleep.  David wakes up to go to the bathroom.  On weekends, David goes to sleep at 11:00 PM.  He wakes up at 9:00 AM without an alarm. He falls asleep in 5 minutes.  Patient gets 7-8 hours of sleep per night.     Patient does read in bed and does not use electronics in bed and watch TV in bed.     David does not do shift work.      David does snore every night and snoring is very loud. Patient does have a regular bed partner. There is report of snoring.  He does have witnessed apneas. They frequently sleep separately.  Patient sleeps on his side. He has frequent morning dry mouth, denies morning headaches, morning confusion and restless legs. David reports sleep walking, he reports one episode of going to the bathroom and waking up in the morning in different room. His wife reports vocalizations at night. He denies any bruxism, dream enactment, sleep paralysis, cataplexy and hypnogogic/hypnopompic hallucinations.    David denies difficulty breathing through his nose, claustrophobia and reflux at night.      David has gained 0-5 pounds in the last year.  Patient describes themself as a night person.  He would prefer to go to sleep at 12:00 AM and wake up at 8:00 AM.  Patient's Kirk Sleepiness score 9/24 inconsistent with excessive  daytime sleepiness.  He reports fatigue.     David naps 2 times per week for " 20 minutes, feels refreshed after naps. He takes no inadvertant naps.  He denies falling asleep while driving. Patient was counseled on the importance of driving while alert, to pull over if drowsy, or nap before getting into the vehicle if sleepy.  He has one soda per day.    Allergies:    Allergies   Allergen Reactions     Lipitor [Atorvastatin Calcium] Cramps     Severe muscle aches   Took 2 years to go a way   Jack Lugo MD         Medications:    Current Outpatient Prescriptions   Medication Sig Dispense Refill     aspirin 81 MG tablet Take 81 mg by mouth daily Patient takes 1 tablet every other day 30 tablet      econazole nitrate 1 % cream Apply topically 2 times daily 60 g 3     fish oil-omega-3 fatty acids (FISH OIL) 1000 MG capsule Take 2-4 capsules by mouth daily.       Glucosamine-Chondroit-Vit C-Mn (GLUCOSAMINE 1500 COMPLEX) CAPS Take  by mouth.       ibuprofen (ADVIL,MOTRIN) 200 MG tablet Take 400 mg by mouth every 4 hours as needed for mild pain       ketoconazole (NIZORAL) 2 % shampoo Apply to the affected area and wash off after 5 minutes daily for first 2 weeks. 240 mL 11     Multiple Vitamin (MULTI-VITAMIN) per tablet Take 1 tablet by mouth daily.       NEW MED Taking OTC medications that include Apple Vinegar, Med for his Sinus, Gummy Bears.       sildenafil (VIAGRA) 100 MG tablet Take 0.5-1 tablets ( mg) by mouth daily as needed for erectile dysfunction Take 30 min to 4 hours before intercourse. 6 tablet 11       Problem List:  Patient Active Problem List    Diagnosis Date Noted     Memory loss 06/27/2018     Priority: Medium     Erectile dysfunction, unspecified erectile dysfunction type 07/29/2016     Priority: Medium     Prediabetes 07/29/2016     Priority: Medium     Fatigue, unspecified type 07/29/2016     Priority: Medium     Advanced directives, counseling/discussion 05/29/2013     Priority: Medium     Patient does not have an Advance/Health Care Directive (HCD), requests  blank HCD form.    Nazia Spence  May 29, 2013         Thyroid nodule 11/22/2010     Priority: Medium     Tinea versicolor 09/23/2010     Priority: Medium        Past Medical/Surgical History:  Past Medical History:   Diagnosis Date     Other and unspecified hyperlipidemia      Past Surgical History:   Procedure Laterality Date     COLONOSCOPY N/A 8/16/2016    Procedure: COLONOSCOPY;  Surgeon: Ketan Morgan MD;  Location: [facility]     SURGICAL HISTORY OF -       1 [place] tooth extraction      SURGICAL HISTORY OF -       Flexible Sigmoidoscopy        Social History:  Social History     Social History     Marital status:      Spouse name: N/A     Number of children: N/A     Years of education: N/A     Occupational History      [employer]     Social History Main Topics     Smoking status: Never Smoker     Smokeless tobacco: Never Used     Alcohol use Yes      Comment: occassionally     Drug use: No     Sexual activity: Yes     Partners: Female     Birth control/ protection: Surgical      Comment: wife has had tubal     Other Topics Concern     Parent/Sibling W/ Cabg, Mi Or Angioplasty Before 65f 55m? No     Social History Narrative       Family History:  Family History   Problem Relation Age of Onset     Circulatory Father      bypass surgery at 67, valve replacement     HEART DISEASE Father 67     bypass-age 67, valve replacement age 72     Dementia Father      Cancer - colorectal Maternal Grandmother      Obesity Brother      Obesity Sister      GASTROINTESTINAL DISEASE Daughter      esophageal surgery, narrowing of esophagus     Neurologic Disorder Mother        Review of Systems:  A complete review of systems reviewed by me is negative with the exeption of what has been mentioned in the history of present illness.  CONSTITUTIONAL: NEGATIVE for weight gain/loss, fever, chills, sweats or night sweats, drug allergies.  EYES:  POSITIVE for changes in vision  ENT: NEGATIVE for ear pain, sore throat,  "sinus pain, post-nasal drip, runny nose, bloody nose  CARDIAC:  POSITIVE for  swollen legs and swollen feet  NEUROLOGIC: NEGATIVE headaches, weakness or numbness in the arms or legs.  DERMATOLOGIC: NEGATIVE for rashes, new moles or change in mole(s)  PULMONARY:  POSITIVE for  SOB with activity  GASTROINTESTINAL: NEGATIVE for nausea or vomitting, loose or watery stools, fat or grease in stools, constipation, abdominal pain, bowel movements black in color or blood noted.  GENITOURINARY: NEGATIVE for pain during urination, blood in urine, urinating more frequently than usual, irregular menstrual periods.  MUSCULOSKELETAL: NEGATIVE for muscle pain, bone or joint pain, swollen joints.  ENDOCRINE: NEGATIVE for increased thirst or urination, diabetes.  LYMPHATIC: NEGATIVE for swollen lymph nodes, lumps or bumps in the breasts or nipple discharge.    Physical Examination:  Vitals: /68  Pulse 67  Ht 1.772 m (5' 9.75\")  Wt 98.9 kg (218 lb)  SpO2 97%  BMI 31.5 kg/m2  BMI= Body mass index is 31.5 kg/(m^2).    Neck Cir (cm): 45 cm    Morris Total Score 7/10/2018   Total score - Morris 9          GENERAL APPEARANCE: alert and no distress  EYES: Eyes grossly normal to inspection, PERRL and conjunctivae and sclerae normal  HENT: ear canals and TM's normal, nose and mouth without ulcers or lesions, oropharynx crowded and tongue base enlarged  NECK: Right neck appears belle.   RESP: lungs clear to auscultation - no rales, rhonchi or wheezes  CV: regular rates and rhythm and normal S1 S2, no S3 or S4  MS: extremities normal- no gross deformities noted  NEURO: Normal strength and tone, mentation intact and speech normal  PSYCH: mentation appears normal and affect normal/bright  Mallampati Class: IV.  Tonsillar Stage:   Last Basic Metabolic Panel:  Lab Results   Component Value Date     05/29/2013      Lab Results   Component Value Date    POTASSIUM 4.8 05/29/2013     Lab Results   Component Value Date    CHLORIDE " 104 05/29/2013     Lab Results   Component Value Date    ALEXANDR 9.6 05/29/2013     Lab Results   Component Value Date    CO2 29 05/29/2013     Lab Results   Component Value Date    BUN 16 05/29/2013     Lab Results   Component Value Date    CR 1.00 05/29/2013     Lab Results   Component Value Date    GLC 97 09/13/2017     TSH   Date Value Ref Range Status   07/29/2016 1.15 0.40 - 4.00 mU/L Final      David Clark is a 67 year old male with medical history remarkable for obesity. He seeking sleep evaluation in the setting of recent memory concerns and ongoing snoring.     Assessment:  1. High probability obstructive sleep apnea based on loud snoring, witnessed apnea, daytime sleepiness, morning headaches, crowded oropharynx, large neck circumference and co-morbid DMII and HTN. The STOP-BANG score is 7/8.   2.  One episode of finding himself in a different room.  Concern for nocturnal seizure activity is low.     Plan:  1. Recommend Polysomnogram (using 4% desaturation/Medicare/2012 AASM 1B scoring rules) for high probability obstructive sleep apnea and to evaluate for parasomnia. Patient is a poor candidate for Home Sleep Testing due to parasomnia. Ambien if needed.   2. Discussed etiology of NREM/REM parasomnias, reduction of triggers (novel environments, caffeine, alcohol, sleep deprivation), safety (consideration of door, matress rather than bed, securing firearms). Will consider pharmacotherapy if the problem persists after sleep disordered breathing is treated.     Literature provided regarding sleep apnea and sleep hygiene.      He will follow up with me in approximately two weeks after his sleep study has been competed to review the results and discuss plan of care.       Polysomnography reviewed.  Obstructive sleep apnea reviewed.  Complications of untreated sleep apnea were reviewed.    Melanie Marinelli PA-C    CC: No ref. provider found

## 2018-07-11 ENCOUNTER — THERAPY VISIT (OUTPATIENT)
Dept: SLEEP MEDICINE | Facility: CLINIC | Age: 68
End: 2018-07-11
Payer: MEDICARE

## 2018-07-11 DIAGNOSIS — E66.811 CLASS 1 OBESITY DUE TO EXCESS CALORIES WITH BODY MASS INDEX (BMI) OF 31.0 TO 31.9 IN ADULT, UNSPECIFIED WHETHER SERIOUS COMORBIDITY PRESENT: ICD-10-CM

## 2018-07-11 DIAGNOSIS — R41.3 MEMORY CHANGES: ICD-10-CM

## 2018-07-11 DIAGNOSIS — R06.89 DYSPNEA AND RESPIRATORY ABNORMALITY: ICD-10-CM

## 2018-07-11 DIAGNOSIS — R53.81 MALAISE AND FATIGUE: ICD-10-CM

## 2018-07-11 DIAGNOSIS — R06.00 DYSPNEA AND RESPIRATORY ABNORMALITY: ICD-10-CM

## 2018-07-11 DIAGNOSIS — E66.09 CLASS 1 OBESITY DUE TO EXCESS CALORIES WITH BODY MASS INDEX (BMI) OF 31.0 TO 31.9 IN ADULT, UNSPECIFIED WHETHER SERIOUS COMORBIDITY PRESENT: ICD-10-CM

## 2018-07-11 DIAGNOSIS — G47.30 SLEEP APNEA, UNSPECIFIED TYPE: ICD-10-CM

## 2018-07-11 DIAGNOSIS — Z72.820 LACK OF ADEQUATE SLEEP: ICD-10-CM

## 2018-07-11 DIAGNOSIS — R53.83 MALAISE AND FATIGUE: ICD-10-CM

## 2018-07-11 PROCEDURE — 95810 POLYSOM 6/> YRS 4/> PARAM: CPT | Performed by: FAMILY MEDICINE

## 2018-07-11 NOTE — MR AVS SNAPSHOT
After Visit Summary   7/11/2018    David Clark    MRN: [id number]           Patient Information     Date Of Birth          1950        Visit Information        Provider Department      7/11/2018 8:00 PM SLEEP LAB, BED THREE [facility]        Today's Diagnoses     Memory changes        Class 1 obesity due to excess calories with body mass index (BMI) of 31.0 to 31.9 in adult, unspecified whether serious comorbidity present        Lack of adequate sleep        Sleep apnea, unspecified type        Dyspnea and respiratory abnormality        Malaise and fatigue           Follow-ups after your visit        Your next 10 appointments already scheduled     Jul 17, 2018 10:30 AM CDT   Return Sleep Patient with ARTUR Mejia   [facility] ([facility])    [id number]  [facility] [address] [phone]              Who to contact     If you have questions or need follow up information about today's clinic visit or your schedule please contact [facility] directly at [phone].  Normal or non-critical lab and imaging results will be communicated to you by MyChart, letter or phone within 4 business days after the clinic has received the results. If you do not hear from us within 7 days, please contact the clinic through [employer] or phone. If you have a critical or abnormal lab result, we will notify you by phone as soon as possible.  Submit refill requests through [employer] or call your pharmacy and they will forward the refill request to us. Please allow 3 business days for your refill to be completed.          Additional Information About Your Visit        [employer] Information     [employer] gives you secure access to your electronic health record. If you see a primary care provider, you can also send messages to your care team and make appointments. If you have questions, please call your primary care clinic.  If you  do not have a primary care provider, please call [phone] and they will assist you.        Care EveryWhere ID     This is your Care EveryWhere ID. This could be used by other organizations to access your [place] medical records  [id number]         Blood Pressure from Last 3 Encounters:   07/10/18 111/68   06/27/18 102/70   09/13/17 123/75    Weight from Last 3 Encounters:   07/10/18 98.9 kg (218 lb)   06/27/18 99.6 kg (219 lb 8 oz)   09/13/17 99.8 kg (220 lb)              We Performed the Following     Comprehensive Sleep Study        Primary Care Provider Office Phone # Fax #    Gal Robison [phone] [address] [facility] [address]        Equal Access to Services     KENDAL BUSCH : Hadii aad ku hadasho Sosheryl, waaxda luqadaha, qaybta kaalmada adekrystalyapaige, iban frye . So [facility] [phone].    ATENCIÓN: Si habla español, tiene a delgadillo disposición servicios gratuitos de asistencia lingüística. [facility] [phone].    We comply with applicable federal civil rights laws and [place] laws. We do not discriminate on the basis of race, color, national origin, age, disability, sex, sexual orientation, or gender identity.            Thank you!     Thank you for choosing [facility]  for your care. Our goal is always to provide you with excellent care. Hearing back from our patients is one way we can continue to improve our services. Please take a few minutes to complete the written survey that you may receive in the mail after your visit with us. Thank you!             Your Updated Medication List - Protect others around you: Learn how to safely use, store and throw away your medicines at www.disposemymeds.org.          This list is accurate as of 7/11/18 11:59 PM.  Always use your most recent med list.                   Brand Name Dispense Instructions for use Diagnosis    aspirin 81 MG tablet     30 tablet     Take 81 mg by mouth daily Patient takes 1 tablet every other day        econazole nitrate 1 % cream     60 g    Apply topically 2 times daily    TV (tinea versicolor)       fish oil-omega-3 fatty acids 1000 MG capsule      Take 2-4 capsules by mouth daily.        GLUCOSAMINE 1500 COMPLEX Caps      Take  by mouth.        ibuprofen 200 MG tablet    ADVIL/MOTRIN     Take 400 mg by mouth every 4 hours as needed for mild pain        ketoconazole 2 % shampoo    NIZORAL    240 mL    Apply to the affected area and wash off after 5 minutes daily for first 2 weeks.    TV (tinea versicolor)       Multi-vitamin Tabs tablet   Generic drug:  multivitamin, therapeutic with minerals      Take 1 tablet by mouth daily.        NEW MED      Taking OTC medications that include Apple Vinegar, Med for his Sinus, Gummy Bears.    Routine history and physical examination of adult       sildenafil 100 MG tablet    VIAGRA    6 tablet    Take 0.5-1 tablets ( mg) by mouth daily as needed for erectile dysfunction Take 30 min to 4 hours before intercourse.    Erectile dysfunction, unspecified erectile dysfunction type       zolpidem 5 MG tablet    AMBIEN    1 tablet    Take tablet by mouth 15 minutes prior to sleep, for Sleep Study

## 2018-07-13 DIAGNOSIS — R41.3 MEMORY LOSS: Primary | ICD-10-CM

## 2018-07-13 DIAGNOSIS — R41.3 MEMORY LOSS: ICD-10-CM

## 2018-07-13 DIAGNOSIS — R79.89 ELEVATED LFTS: ICD-10-CM

## 2018-07-13 LAB
ALBUMIN SERPL-MCNC: 3.8 G/DL (ref 3.4–5)
ALP SERPL-CCNC: 92 U/L (ref 40–150)
ALT SERPL W P-5'-P-CCNC: 151 U/L (ref 0–70)
ANION GAP SERPL CALCULATED.3IONS-SCNC: 4 MMOL/L (ref 3–14)
AST SERPL W P-5'-P-CCNC: 85 U/L (ref 0–45)
BILIRUB SERPL-MCNC: 0.7 MG/DL (ref 0.2–1.3)
BUN SERPL-MCNC: 14 MG/DL (ref 7–30)
CALCIUM SERPL-MCNC: 9 MG/DL (ref 8.5–10.1)
CHLORIDE SERPL-SCNC: 104 MMOL/L (ref 94–109)
CO2 SERPL-SCNC: 29 MMOL/L (ref 20–32)
CREAT SERPL-MCNC: 0.96 MG/DL (ref 0.66–1.25)
ERYTHROCYTE [DISTWIDTH] IN BLOOD BY AUTOMATED COUNT: 13.5 % (ref 10–15)
GFR SERPL CREATININE-BSD FRML MDRD: 78 ML/MIN/1.7M2
GLUCOSE SERPL-MCNC: 99 MG/DL (ref 70–99)
HCT VFR BLD AUTO: 47.8 % (ref 40–53)
HGB BLD-MCNC: 16.8 G/DL (ref 13.3–17.7)
MCH RBC QN AUTO: 31.3 PG (ref 26.5–33)
MCHC RBC AUTO-ENTMCNC: 35.1 G/DL (ref 31.5–36.5)
MCV RBC AUTO: 89 FL (ref 78–100)
PLATELET # BLD AUTO: 165 10E9/L (ref 150–450)
POTASSIUM SERPL-SCNC: 4 MMOL/L (ref 3.4–5.3)
PROT SERPL-MCNC: 7.3 G/DL (ref 6.8–8.8)
RBC # BLD AUTO: 5.36 10E12/L (ref 4.4–5.9)
SLPCOMP: NORMAL
SODIUM SERPL-SCNC: 137 MMOL/L (ref 133–144)
TSH SERPL DL<=0.005 MIU/L-ACNC: 1.16 MU/L (ref 0.4–4)
WBC # BLD AUTO: 6.3 10E9/L (ref 4–11)

## 2018-07-13 PROCEDURE — 85027 COMPLETE CBC AUTOMATED: CPT | Performed by: FAMILY MEDICINE

## 2018-07-13 PROCEDURE — 36415 COLL VENOUS BLD VENIPUNCTURE: CPT | Performed by: FAMILY MEDICINE

## 2018-07-13 PROCEDURE — 84443 ASSAY THYROID STIM HORMONE: CPT | Performed by: FAMILY MEDICINE

## 2018-07-13 PROCEDURE — 80053 COMPREHEN METABOLIC PANEL: CPT | Performed by: FAMILY MEDICINE

## 2018-07-17 ENCOUNTER — HOSPITAL ENCOUNTER (OUTPATIENT)
Dept: MRI IMAGING | Facility: CLINIC | Age: 68
Discharge: HOME | End: 2018-07-17
Attending: INTERNAL MEDICINE | Admitting: INTERNAL MEDICINE
Payer: MEDICARE

## 2018-07-17 ENCOUNTER — OFFICE VISIT (OUTPATIENT)
Dept: SLEEP MEDICINE | Facility: CLINIC | Age: 68
End: 2018-07-17
Payer: MEDICARE

## 2018-07-17 VITALS
WEIGHT: 218 LBS | SYSTOLIC BLOOD PRESSURE: 113 MMHG | BODY MASS INDEX: 31.21 KG/M2 | HEIGHT: 70 IN | HEART RATE: 75 BPM | DIASTOLIC BLOOD PRESSURE: 67 MMHG | OXYGEN SATURATION: 94 %

## 2018-07-17 DIAGNOSIS — R41.3 MEMORY LOSS: ICD-10-CM

## 2018-07-17 DIAGNOSIS — R94.01 ABNORMAL EEG: ICD-10-CM

## 2018-07-17 DIAGNOSIS — G47.33 OSA (OBSTRUCTIVE SLEEP APNEA): Primary | ICD-10-CM

## 2018-07-17 DIAGNOSIS — R79.89 ELEVATED LFTS: ICD-10-CM

## 2018-07-17 LAB
ALBUMIN SERPL-MCNC: 3.7 G/DL (ref 3.4–5)
ALP SERPL-CCNC: 84 U/L (ref 40–150)
ALT SERPL W P-5'-P-CCNC: 68 U/L (ref 0–70)
AST SERPL W P-5'-P-CCNC: 29 U/L (ref 0–45)
BILIRUB DIRECT SERPL-MCNC: <0.1 MG/DL (ref 0–0.2)
BILIRUB SERPL-MCNC: 0.6 MG/DL (ref 0.2–1.3)
PROT SERPL-MCNC: 7 G/DL (ref 6.8–8.8)

## 2018-07-17 PROCEDURE — A9585 GADOBUTROL INJECTION: HCPCS | Performed by: INTERNAL MEDICINE

## 2018-07-17 PROCEDURE — 80076 HEPATIC FUNCTION PANEL: CPT | Performed by: INTERNAL MEDICINE

## 2018-07-17 PROCEDURE — 70553 MRI BRAIN STEM W/O & W/DYE: CPT

## 2018-07-17 PROCEDURE — 36415 COLL VENOUS BLD VENIPUNCTURE: CPT | Performed by: INTERNAL MEDICINE

## 2018-07-17 PROCEDURE — 99214 OFFICE O/P EST MOD 30 MIN: CPT | Performed by: PHYSICIAN ASSISTANT

## 2018-07-17 PROCEDURE — 25000128 H RX IP 250 OP 636: Performed by: INTERNAL MEDICINE

## 2018-07-17 RX ORDER — GADOBUTROL 604.72 MG/ML
10 INJECTION INTRAVENOUS ONCE
Status: COMPLETED | OUTPATIENT
Start: 2018-07-17 | End: 2018-07-17

## 2018-07-17 RX ADMIN — GADOBUTROL 10 ML: 604.72 INJECTION INTRAVENOUS at 08:00

## 2018-07-17 NOTE — MR AVS SNAPSHOT
After Visit Summary   7/17/2018    David Clark    MRN: [id number]           Patient Information     Date Of Birth          1950        Visit Information        Provider Department      7/17/2018 10:30 AM Melanie Marinelli PA [facility] [place]        Today's Diagnoses     EDY (obstructive sleep apnea)    -  1    Abnormal EEG          Care Instructions      Your BMI is Body mass index is 31.5 kg/(m^2).  Weight management is a personal decision.  If you are interested in exploring weight loss strategies, the following discussion covers the approaches that may be successful. Body mass index (BMI) is one way to tell whether you are at a healthy weight, overweight, or obese. It measures your weight in relation to your height.  A BMI of 18.5 to 24.9 is in the healthy range. A person with a BMI of 25 to 29.9 is considered overweight, and someone with a BMI of 30 or greater is considered obese. More than two-thirds of American adults are considered overweight or obese.  Being overweight or obese increases the risk for further weight gain. Excess weight may lead to heart disease and diabetes.  Creating and following plans for healthy eating and physical activity may help you improve your health.  Weight control is part of healthy lifestyle and includes exercise, emotional health, and healthy eating habits. Careful eating habits lifelong are the mainstay of weight control. Though there are significant health benefits from weight loss, long-term weight loss with diet alone may be very difficult to achieve- studies show long-term success with dietary management in less than 10% of people. Attaining a healthy weight may be especially difficult to achieve in those with severe obesity. In some cases, medications, devices and surgical management might be considered.  What can you do?  If you are overweight or obese and are interested in methods for weight loss, you should discuss this with your  provider.     Consider reducing daily calorie intake by 500 calories.     Keep a food journal.     Avoiding skipping meals, consider cutting portions instead.    Diet combined with exercise helps maintain muscle while optimizing fat loss. Strength training is particularly important for building and maintaining muscle mass. Exercise helps reduce stress, increase energy, and improves fitness. Increasing exercise without diet control, however, may not burn enough calories to loose weight.       Start walking three days a week 10-20 minutes at a time    Work towards walking thirty minutes five days a week     Eventually, increase the speed of your walking for 1-2 minutes at time    In addition, we recommend that you review healthy lifestyles and methods for weight loss available through the National Institutes of Health patient information sites:  http://win.niddk.nih.gov/publications/index.htm    And look into health and wellness programs that may be available through your health insurance provider, employer, local community center, or titi club.    Weight management plan: Patient was referred to their PCP to discuss a diet and exercise plan.         Your Body mass index is 31.5 kg/(m^2).  Weight management is a personal decision.  If you are interested in exploring weight loss strategies, the following discussion covers the approaches that may be successful. Body mass index (BMI) is one way to tell whether you are at a healthy weight, overweight, or obese. It measures your weight in relation to your height.  A BMI of 18.5 to 24.9 is in the healthy range. A person with a BMI of 25 to 29.9 is considered overweight, and someone with a BMI of 30 or greater is considered obese. More than two-thirds of American adults are considered overweight or obese.  Being overweight or obese increases the risk for further weight gain. Excess weight may lead to heart disease and diabetes.  Creating and following plans for healthy  eating and physical activity may help you improve your health.  Weight control is part of healthy lifestyle and includes exercise, emotional health, and healthy eating habits. Careful eating habits lifelong are the mainstay of weight control. Though there are significant health benefits from weight loss, long-term weight loss with diet alone may be very difficult to achieve- studies show long-term success with dietary management in less than 10% of people. Attaining a healthy weight may be especially difficult to achieve in those with severe obesity. In some cases, medications, devices and surgical management might be considered.  What can you do?  If you are overweight or obese and are interested in methods for weight loss, you should discuss this with your provider.     Consider reducing daily calorie intake by 500 calories.     Keep a food journal.     Avoiding skipping meals, consider cutting portions instead.    Diet combined with exercise helps maintain muscle while optimizing fat loss. Strength training is particularly important for building and maintaining muscle mass. Exercise helps reduce stress, increase energy, and improves fitness. Increasing exercise without diet control, however, may not burn enough calories to loose weight.       Start walking three days a week 10-20 minutes at a time    Work towards walking thirty minutes five days a week     Eventually, increase the speed of your walking for 1-2 minutes at time    In addition, we recommend that you review healthy lifestyles and methods for weight loss available through the National Institutes of Health patient information sites:  http://win.niddk.nih.gov/publications/index.htm    And look into health and wellness programs that may be available through your health insurance provider, employer, local community center, or titi club.    Weight management plan: Patient was referred to their PCP to discuss a diet and exercise plan.          Follow-ups  after your visit        Additional Services     NEUROLOGY ADULT REFERRAL       Your provider has referred you for the following:   [place] [facility] [place] [facility] [phone]   [email or web address]    Please be aware that coverage of these services is subject to the terms and limitations of your health insurance plan.  Call member services at your health plan with any benefit or coverage questions.      Please bring the following with you to your appointment:    (1) Any X-Rays, CTs or MRIs which have been performed.  Contact the facility where they were done to arrange for  prior to your scheduled appointment.    (2) List of current medications  (3) This referral request   (4) Any documents/labs given to you for this referral                  Who to contact     If you have questions or need follow up information about today's clinic visit or your schedule please contact [facility] directly at [phone].  Normal or non-critical lab and imaging results will be communicated to you by MyChart, letter or phone within 4 business days after the clinic has received the results. If you do not hear from us within 7 days, please contact the clinic through [employer] or phone. If you have a critical or abnormal lab result, we will notify you by phone as soon as possible.  Submit refill requests through [employer] or call your pharmacy and they will forward the refill request to us. Please allow 3 business days for your refill to be completed.          Additional Information About Your Visit        [employer] Information     [employer] gives you secure access to your electronic health record. If you see a primary care provider, you can also send messages to your care team and make appointments. If you have questions, please call your primary care clinic.  If you do not have a primary care provider, please call [phone] and they will assist you.        Care EveryWhere  "ID     This is your Care EveryWhere ID. This could be used by other organizations to access your [facility] medical records  [id number]        Your Vitals Were     Pulse Height Pulse Oximetry BMI (Body Mass Index)          75 1.772 m (5' 9.75\") 94% 31.5 kg/m2         Blood Pressure from Last 3 Encounters:   07/17/18 113/67   07/10/18 111/68   06/27/18 102/70    Weight from Last 3 Encounters:   07/17/18 98.9 kg (218 lb)   07/10/18 98.9 kg (218 lb)   06/27/18 99.6 kg (219 lb 8 oz)              We Performed the Following     NEUROLOGY ADULT REFERRAL     Sleep Comprehensive DME        Primary Care Provider Office Phone # Fax #    Gal Robison [phone] [address] [facility] [address]        Equal Access to Services     KENDAL BUSCH : Hadii aad ku hadasho Sosheryl, waaxda luqadaha, qaybta kaalmada adeegyada, iban frye . So [facility] [phone].    ATENCIÓN: Si habla español, tiene a delgadillo disposición servicios gratuitos de asistencia lingüística. Bhumiame al [phone].    We comply with applicable federal civil rights laws and [place] laws. We do not discriminate on the basis of race, color, national origin, age, disability, sex, sexual orientation, or gender identity.            Thank you!     Thank you for choosing [facility]  for your care. Our goal is always to provide you with excellent care. Hearing back from our patients is one way we can continue to improve our services. Please take a few minutes to complete the written survey that you may receive in the mail after your visit with us. Thank you!             Your Updated Medication List - Protect others around you: Learn how to safely use, store and throw away your medicines at www.disposemymeds.org.          This list is accurate as of 7/17/18 10:33 AM.  Always use your most recent med list.                   Brand Name Dispense Instructions for use Diagnosis    " aspirin 81 MG tablet     30 tablet    Take 81 mg by mouth daily Patient takes 1 tablet every other day        econazole nitrate 1 % cream     60 g    Apply topically 2 times daily    TV (tinea versicolor)       fish oil-omega-3 fatty acids 1000 MG capsule      Take 2-4 capsules by mouth daily.        GLUCOSAMINE 1500 COMPLEX Caps      Take  by mouth.        ibuprofen 200 MG tablet    ADVIL/MOTRIN     Take 400 mg by mouth every 4 hours as needed for mild pain        ketoconazole 2 % shampoo    NIZORAL    240 mL    Apply to the affected area and wash off after 5 minutes daily for first 2 weeks.    TV (tinea versicolor)       Multi-vitamin Tabs tablet   Generic drug:  multivitamin, therapeutic with minerals      Take 1 tablet by mouth daily.        NEW MED      Taking OTC medications that include Apple Vinegar, Med for his Sinus, Gummy Bears.    Routine history and physical examination of adult       sildenafil 100 MG tablet    VIAGRA    6 tablet    Take 0.5-1 tablets ( mg) by mouth daily as needed for erectile dysfunction Take 30 min to 4 hours before intercourse.    Erectile dysfunction, unspecified erectile dysfunction type       zolpidem 5 MG tablet    AMBIEN    1 tablet    Take tablet by mouth 15 minutes prior to sleep, for Sleep Study

## 2018-07-17 NOTE — PROGRESS NOTES
Sleep Study Follow-Up Visit:    Date on this visit: 7/17/2018    David Clark comes in today for follow-up of his sleep study done on 7/11/2018 at the [facility] [place] for memory concerns, snoring, witnessed apnea, daytime sleepiness, morning headaches, crowded oropharynx, large neck circumference and co-morbid DMII and HTN.    Diagnostic PSG:  Polysomnogram Data: A full night polysomnogram recorded the standard physiologic parameters including EEG, EOG, EMG, ECG, nasal and oral airflow. Respiratory parameters of chest and abdominal movements were recorded with respiratory inductance plethysmography. Oxygen saturation was recorded by pulse oximetry. Hypopnea scoring rule used: 1B 4%.   Sleep Architecture: Increased arousal index   The total recording time of the polysomnogram was 451.5 minutes. The total sleep time was 336.0 minutes. Sleep latency was normal at 13.3 minutes with the use of a sleep aid (Zolpidem 5mg). REM latency was 9.5 minutes. Arousal index was increased at 24.1 arousals per hour. Sleep efficiency was decreased at 74.4%. Wake after sleep onset was 99.0 minutes. The patient spent 12.6% of total sleep time in Stage N1, 50.4% in Stage N2, 27.7% in Stage N3, and 9.2% in REM. Time in REM supine was 16.5 minutes.   Respiration: Mild EDY (AHI 8.9, RDI 13.2) without sleep-associated hypoxemia.     Events ? The polysomnogram revealed a presence of - obstructive, 11 central, and 6 mixed apneas resulting in an apnea index of 3.0 events per hour. There were 33 obstructive hypopneas and - central hypopneas resulting in an obstructive hypopnea index of 5.9 and central hypopnea index of - events per hour. The combined apnea/hypopnea index was 8.9 events per hour (central apnea/hypopnea index was 2.0 events per hour). The REM AHI was 23.2 events per hour. The supine AHI was 10.7 events per hour. The RERA index was 4.3 events per hour. The RDI was 13.2 events per hour.     Snoring - was reported as  soft.     Respiratory rate and pattern - was notable for normal respiratory rate and pattern.     Sustained Sleep Associated Hypoventilation - Transcutaneous carbon dioxide monitoring was not used, however significant hypoventilation was not suggested by oximetry.     Sleep Associated Hypoxemia - (Greater than 5 minutes O2 sat at or below 88%) was not present. Baseline oxygen saturation was 93.0%. Lowest oxygen saturation was 87.5%. Time spent less than or equal to 88% was 0.1 minutes. Time spent less than or equal to 89% was 0.9 minutes.     Movement Activity: Frequent PLM s (index 90) with rare cortical arousals (index 5.4). Small number of REM epochs with phasic lack of atonia, but would not appear to fit criteria for REM-sleep behavior disorder.     Unusual EEG appearance for ~4-5 seconds in epoch 717, see in all leads including eye leads, does not appear to represent an eye movement, does not appear to be classic spike-wave appearance.     Periodic Limb Activity - There were 504 PLMs during the entire study. The PLM index was 90.0 movements per hour. The PLM Arousal Index was 5.4 per hour.     REM EMG Activity - Excessive transient/sustained muscle activity was present, see summary.     Nocturnal Behavior - Abnormal sleep related behaviors were noted during out of REM sleep, involving few arm movements.     Bruxism - None apparent.           These findings were reviewed with patient.     Past medical/surgical history, family history, social history, medications and allergies were reviewed.      Problem List:  Patient Active Problem List    Diagnosis Date Noted     Class 1 obesity due to excess calories with body mass index (BMI) of 31.0 to 31.9 in adult, unspecified whether serious comorbidity present 07/10/2018     Priority: Medium     Memory loss 06/27/2018     Priority: Medium     Erectile dysfunction, unspecified erectile dysfunction type 07/29/2016     Priority: Medium     Prediabetes 07/29/2016      "Priority: Medium     Fatigue, unspecified type 07/29/2016     Priority: Medium     Advanced directives, counseling/discussion 05/29/2013     Priority: Medium     Patient does not have an Advance/Health Care Directive (HCD), requests blank HCD form.    Nazia Spence  May 29, 2013         Thyroid nodule 11/22/2010     Priority: Medium     Tinea versicolor 09/23/2010     Priority: Medium      /67  Pulse 75  Ht 1.772 m (5' 9.75\")  Wt 98.9 kg (218 lb)  SpO2 94%  BMI 31.5 kg/m2    Impression/Plan:    1. Mild obstructive sleep apnea without sleep related hypoxemia.   Counseled the patient that mild sleep apnea is not felt to significantly increase long-term cardiovascular risk, but can contribute to excessive daytime sleepiness.    Treatment options discussed today including auto-CPAP,  oral appliance therapy or polysomnography with full night PAP titration.  Elected treatment with auto-CPAP 5-15 cm/H20.   2. Unusual EEG appearance(in the setting of memory concerns) for ~4-5 seconds in epoch 717, see in all leads including eye leads, does not appear to represent an eye movement, does not appear to be classic spike-wave appearance. Neurology referral placed.   3. Patient had elevated periodic limp movements during the study. The majority of these were not associated with cortical arousal. Patient denies wakeful motor restlessness. Will follow.  4. Small number of REM epochs with phasic lack of atonia, but would not appear to fit criteria for REM-sleep behavior disorder.       He will follow up with me in about 6 week(s).     Twenty-five minutes spent with patient, all of which were spent face-to-face counseling, consulting, coordinating plan of care regarding EDY, PLMs.      Melanie Marinelli PA-C    CC: Gal Robison     "

## 2018-07-17 NOTE — PATIENT INSTRUCTIONS

## 2018-07-20 ENCOUNTER — OFFICE VISIT (OUTPATIENT)
Dept: NEUROLOGY | Facility: CLINIC | Age: 68
End: 2018-07-20
Payer: MEDICARE

## 2018-07-20 VITALS
DIASTOLIC BLOOD PRESSURE: 73 MMHG | BODY MASS INDEX: 31.65 KG/M2 | RESPIRATION RATE: 18 BRPM | SYSTOLIC BLOOD PRESSURE: 133 MMHG | WEIGHT: 219 LBS | OXYGEN SATURATION: 96 % | HEART RATE: 75 BPM | TEMPERATURE: 97.9 F

## 2018-07-20 DIAGNOSIS — R68.89 FORGETFULNESS: ICD-10-CM

## 2018-07-20 DIAGNOSIS — R41.89 SPELL OF ALTERED COGNITION: Primary | ICD-10-CM

## 2018-07-20 PROCEDURE — 99205 OFFICE O/P NEW HI 60 MIN: CPT | Performed by: PSYCHIATRY & NEUROLOGY

## 2018-07-20 NOTE — LETTER
7/20/2018         RE: David Clark  [address] [facility] [id number]        Dear Colleague,    Thank you for referring your patient, David Clark, to the [facility]. Please see a copy of my visit note below.    INITIAL NEUROLOGY CONSULTATION    DATE OF VISIT: 7/20/2018  CLINIC LOCATION: [facility]  MRN: [id number]  PATIENT NAME: David Clark  YOB: 1950    PRIMARY CARE PROVIDER: Gal Robison MD     REASON FOR VISIT:   Chief Complaint   Patient presents with     Consult     On June 9 pt didn't know where he was, had slurred speaches, and forgetful. Pt was at a wedding.  MRI was done, and sleep study was done.       HISTORY OF PRESENT ILLNESS:                                                    Mr. David Clark is 67 year old right handed male patient with past medical history of obstructive sleep apnea, hyperlipidemia, and prediabetes, who was seen in consultation today requested by Melanie Marinelli PA-C,  for abnormal EEG.  There is also an earlier consult request from the primary care provider regarding memory loss.  The patient is accompanied by his wife, who participates in interview and serves as a collateral source of information.    Per patient's report, he was in his usual state of health until 06/09/2018, when while at the friend's wedding he had an episode of transient confusion/disorientation to place and time, though was oriented to himself.  Lasted for several hours.  He has vague recollection of the spell.  No rhythmic body or limb movements.  No urine loss.  Nobody said anything to him or his wife about unusual behavior.  Was able to drive home safely.  The patient also did not alert anybody until the next morning when he mentioned the episode to his wife.  No prior similar episodes.  No recurrence.    As a part of evaluation, brain MRI with and without contrast was done on 07/17/2018.  It demonstrated mild atrophy, but  otherwise was negative for acute intracranial pathology.  The patient also had sleep study on 07/11/2018, which was consistent with mild obstructive sleep apnea without sleep associated hypoxemia.  Frequent periodic limb movements with rare cortical arousals were noted.  Small number of REM epochs with phasic lack of atonia was also seen, but did not fit the criteria for REM behavior sleep disorder.  In addition, unusual potentially epileptiform EEG activity for 4-5 seconds in all leads was noted, felt not consistent with classic spike wave discharges.  Neurologic evaluation and formal EEG were recommended.    Birth and childhood development were unremarkable.  The patient denies any history of major head injuries, CNS infections, seizures, or epilepsy.  He also denies any family history of epilepsy.  There is a positive family history of dementia.    The patient denies any memory concerns or current focal neurological symptoms.  Approximately 6 months ago he had a transient episode of left arm weakness and numbness that started after the night sleep and lasted for 1 week without recurrence.  It fully recovered.    According to patient's wife, the patient is more forgetful over the last 2 years.  He tends to occasional repeat the same questions and has occasional word finding difficulties.  At times, he misplaces his belongings.  No difficulty with calendar or keeping up appointments.  No missed medication doses.  No driving or safety concerns.  No accidents or tickets.  Wife is in charge of bills.  No work-related performance concerns.  The wife also noted that the patient is a slightly slower to answer than he used to be.  It takes slightly longer time for him to dress.  She also noticed that he has frequent limb movements in sleep.  At the same time, no bing anosmia, micrographia, hypomimia, bradykinesia, hypophonia, postural instability, shuffling gait, or recent falls.    Recent laboratory evaluation includes  normal CBC, TSH (1.16), and unremarkable CMP, except elevated ALT/AST (normalized on recheck 4 days later).    Neurologic Review of Systems - no amaurosis, diplopia, abnormal speech, other episodes of unilateral numbness or weakness. He endorses chronic low back pain after back injury 2 years ago (previously discussed). Otherwise, he denies any other complaints on 14-point comprehensive review of systems.  PAST MEDICAL/SURGICAL HISTORY:                                                    I personally reviewed patient's past medical and surgical history with the patient at today's visit.  Patient Active Problem List   Diagnosis     Tinea versicolor     Thyroid nodule     Advanced directives, counseling/discussion     Erectile dysfunction, unspecified erectile dysfunction type     Prediabetes     Fatigue, unspecified type     Memory loss     Class 1 obesity due to excess calories with body mass index (BMI) of 31.0 to 31.9 in adult, unspecified whether serious comorbidity present     Past Medical History:   Diagnosis Date     Other and unspecified hyperlipidemia      Past Surgical History:   Procedure Laterality Date     COLONOSCOPY N/A 8/16/2016    Procedure: COLONOSCOPY;  Surgeon: Ketan Morgan MD;  Location: [facility]     SURGICAL HISTORY OF -       1 Delta tooth extraction      SURGICAL HISTORY OF -       Flexible Sigmoidoscopy      MEDICATIONS:                                                    I personally reviewed patient's medications and allergies with the patient at today's visit.  Current Outpatient Prescriptions on File Prior to Visit:  aspirin 81 MG tablet Take 81 mg by mouth daily Patient takes 1 tablet every other day   econazole nitrate 1 % cream Apply topically 2 times daily   fish oil-omega-3 fatty acids (FISH OIL) 1000 MG capsule Take 2-4 capsules by mouth daily.   Glucosamine-Chondroit-Vit C-Mn (GLUCOSAMINE 1500 COMPLEX) CAPS Take  by mouth.   ketoconazole (NIZORAL) 2 % shampoo Apply to the  affected area and wash off after 5 minutes daily for first 2 weeks.   Multiple Vitamin (MULTI-VITAMIN) per tablet Take 1 tablet by mouth daily.   NEW MED Taking OTC medications that include Apple Vinegar, Med for his Sinus, Gummy Bears.   ibuprofen (ADVIL,MOTRIN) 200 MG tablet Take 400 mg by mouth every 4 hours as needed for mild pain   sildenafil (VIAGRA) 100 MG tablet Take 0.5-1 tablets ( mg) by mouth daily as needed for erectile dysfunction Take 30 min to 4 hours before intercourse. (Patient not taking: Reported on 7/20/2018)   zolpidem (AMBIEN) 5 MG tablet Take tablet by mouth 15 minutes prior to sleep, for Sleep Study (Patient not taking: Reported on 7/20/2018)     ALLERGIES:                                                      Allergies   Allergen Reactions     Lipitor [Atorvastatin Calcium] Cramps     Severe muscle aches   Took 2 years to go a way   Jack Luog MD       FAMILY/SOCIAL HISTORY:                                                    Family and social history was reviewed with the patient at today's visit.  Family history is positive for dementia.  , lives with his spouse.  Never smoker.  Drinks 3 beers per week.  Denies use of recreational drugs.  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  REVIEW OF SYSTEMS:                                                    Patient has completed a [facility] Patient Health History, including a 14-system review, which was personally reviewed, and pertinent positives are listed in HPI. He denies any additional problems on the further questioning.  EXAM:                                                    VITAL SIGNS:   /73 (BP Location: Left arm, Patient Position: Sitting, Cuff Size: Adult Regular)  Pulse 75  Temp 97.9  F (36.6  C) (Oral)  Resp 18  Wt 99.3 kg (219 lb)  SpO2 96%  BMI 31.65 kg/m2  Mini-Cog Assessment:  Mini Cog Assessment  Clock Draw Score: 2 Normal  3 Item Recall: 3 objects recalled  Mini  Cog Total Score: 5    General: pt is in NAD, cooperative.  Skin: normal turgor, moist mucous membranes, no lesions/rashes noticed.  HEENT: ATNC, EOMI, PERRL, white sclera, normal conjunctiva, no nystagmus or ptosis. No carotid bruits bilaterally.  Respiratory: lung sounds clear to auscultation bilaterally, no crackles, wheezes, rhonchi. Symmetric lung excursion, no accessory respiratory muscle use.  Cardiovascular: normal S1/S2, no murmurs/rubs/gallops.   Abdomen: Not distended.  : deferred.    Neurological:  Mental: alert, follows commands, Mini Cog Total Score: 5/5 with 3/3 on memory recall, no aphasia or dysarthria. Fund of knowledge is appropriate for age.  Cranial Nerves:  CN II: visual acuity - able to accurately count fingers with each eye. Visual fields intact, fundi: discs sharp, no papilledema and normal vessels bilaterally.  CN III, IV, VI: EOM intact, pupils equal and reactive  CN V: facial sensation nl  CN VII: face symmetric, no facial droop  CN VIII: hearing normal  CN IX: palate elevation symmetric, uvula at midline  CN XI SCM normal, shoulder shrug nl  CN XII: tongue midline  Motor: Strength: 5/5 in all major groups of all extremities. Normal tone, even with provoking maneuvers.  No persistent tremor.  No other abnormal movements. No pronator drift b/l.  Reflexes: Triceps, biceps, brachioradialis, patellar, and achilles reflexes normal and symmetric. No clonus noted. Toes are down-going b/l.   Sensory: temperature, light touch, pinprick, and vibration intact. Romberg: negative.  Coordination: FNF and heel-shin tests intact b/l.   Gait: Slightly stooped forward posture.  Normal stride length and arm swings, able to toe and heel walk, though has mild difficulty with tandem.  Turns with 1 step.  DATA:   LABS/IMAGING/OTHER STUDIES: I reviewed pertinent medical records, including personal review of brain MRI images, sleep study report, sleep medicine and primary care notes, and Care Everywhere, as  detailed in the history of present illness.  ASSESSMENT and PLAN:      ASSESSMENT: David Clark is a 67 year old male patient this past medical history of obstructive sleep apnea, hyperlipidemia, and prediabetes, who presents with single episode of confusion/disorientation in context of forgetfulness and possible abnormal EEG.    We had a prolonged discussion with the patient and his wife regarding his symptoms.  His neurological exam today is non-focal.  His brief cognitive screening test is normal.  Brain MRI, reviewed with the patient and his wife in detail, demonstrated mild cerebral atrophy, which is by itself nonspecific, and did not provide any possible explanations of his symptoms.    The clinical presentation of confusional spell might be consistent with transient global amnesia versus less likely focal seizure, though lack of clear description makes it more difficult to diagnose.  The fact that his sleep study EEG was potentially abnormal, will require more dedicated study, sleep deprived 3 hour EEG to evaluate for epileptiform discharges.    The reported forgetfulness does not currently affect his function.  He is still able to work as a research  without concerns for his performance.  His TSH is normal.  I do not think that we need to pursue further evaluation at this point.  However, the patient was advised to return for more dedicated memory testing if his cognitive complaints worsen.  At that time we might consider doing MoCA and neuropsychologic evaluation.    We also discussed his sleep movements and lack of atonia during REM sleep that might be suggestive of the future development of REM behavior sleep disorder, a condition that might increase the risks of Parkinson's disease or other related movement disorders.  At the present time, I do not see any evidence of extrapyramidal condition, but the patient was advised to return for additional evaluation if he develops such neurological  symptoms in the future.    Transient left arm numbness and weakness did not recur for 6 months.  The patient was advised to call 911 and present to emergency room if this happens again.  It is possible that he suffered a transient ischemic attack or had transient episode of brachial plexopathy/focal neuropathy related to positioning in sleep, but it would be difficult to tell for sure.  He is on baby aspirin for stroke prevention.    DIAGNOSES:    ICD-10-CM    1. Spell of altered cognition R41.89 EEG EXTENDED MONITORING > 1HR   2. Forgetfulness R68.89      PLAN: At today's visit we thoroughly discussed various diagnostic possibilities for patient's symptoms and the reasons for work-up, which includes:  Orders Placed This Encounter   Procedures     EEG EXTENDED MONITORING > 1HR     No new medications were ordered.    Additional recommendations after the work-up.    Next follow-up appointment is on as needed basis (based on results).    I advised the patient to call me with any questions or concerns.    Total Time:  82 minutes with > 50% spent counseling the patient and his wife on stated above assessment and recommendations, including nature of the differential diagnosis, needed w/u, proposed plan, and prognosis.  Additional time was used to answer numerous questions regarding patient's condition and the plan.    Albaro Victoria MD  / Neurology  [place]  (Chart documentation was completed in part with Dragon voice-recognition software. Even though reviewed, some grammatical, spelling, and word errors may remain.)              Again, thank you for allowing me to participate in the care of your patient.        Sincerely,        Albaro Victoria MD

## 2018-07-20 NOTE — PROGRESS NOTES
INITIAL NEUROLOGY CONSULTATION    DATE OF VISIT: 7/20/2018  CLINIC LOCATION: [facility]  MRN: [id number]  PATIENT NAME: David Clark  YOB: 1950    PRIMARY CARE PROVIDER: Gal Robison MD     REASON FOR VISIT:   Chief Complaint   Patient presents with     Consult     On June 9 pt didn't know where he was, had slurred speaches, and forgetful. Pt was at a wedding.  MRI was done, and sleep study was done.       HISTORY OF PRESENT ILLNESS:                                                    Mr. David Clark is 67 year old right handed male patient with past medical history of obstructive sleep apnea, hyperlipidemia, and prediabetes, who was seen in consultation today requested by Melanie Marinelli PA-C,  for abnormal EEG.  There is also an earlier consult request from the primary care provider regarding memory loss.  The patient is accompanied by his wife, who participates in interview and serves as a collateral source of information.    Per patient's report, he was in his usual state of health until 06/09/2018, when while at the friend's wedding he had an episode of transient confusion/disorientation to place and time, though was oriented to himself.  Lasted for several hours.  He has vague recollection of the spell.  No rhythmic body or limb movements.  No urine loss.  Nobody said anything to him or his wife about unusual behavior.  Was able to drive home safely.  The patient also did not alert anybody until the next morning when he mentioned the episode to his wife.  No prior similar episodes.  No recurrence.    As a part of evaluation, brain MRI with and without contrast was done on 07/17/2018.  It demonstrated mild atrophy, but otherwise was negative for acute intracranial pathology.  The patient also had sleep study on 07/11/2018, which was consistent with mild obstructive sleep apnea without sleep associated hypoxemia.  Frequent periodic limb movements with rare cortical arousals  were noted.  Small number of REM epochs with phasic lack of atonia was also seen, but did not fit the criteria for REM behavior sleep disorder.  In addition, unusual potentially epileptiform EEG activity for 4-5 seconds in all leads was noted, felt not consistent with classic spike wave discharges.  Neurologic evaluation and formal EEG were recommended.    Birth and childhood development were unremarkable.  The patient denies any history of major head injuries, CNS infections, seizures, or epilepsy.  He also denies any family history of epilepsy.  There is a positive family history of dementia.    The patient denies any memory concerns or current focal neurological symptoms.  Approximately 6 months ago he had a transient episode of left arm weakness and numbness that started after the night sleep and lasted for 1 week without recurrence.  It fully recovered.    According to patient's wife, the patient is more forgetful over the last 2 years.  He tends to occasional repeat the same questions and has occasional word finding difficulties.  At times, he misplaces his belongings.  No difficulty with calendar or keeping up appointments.  No missed medication doses.  No driving or safety concerns.  No accidents or tickets.  Wife is in charge of bills.  No work-related performance concerns.  The wife also noted that the patient is a slightly slower to answer than he used to be.  It takes slightly longer time for him to dress.  She also noticed that he has frequent limb movements in sleep.  At the same time, no bing anosmia, micrographia, hypomimia, bradykinesia, hypophonia, postural instability, shuffling gait, or recent falls.    Recent laboratory evaluation includes normal CBC, TSH (1.16), and unremarkable CMP, except elevated ALT/AST (normalized on recheck 4 days later).    Neurologic Review of Systems - no amaurosis, diplopia, abnormal speech, other episodes of unilateral numbness or weakness. He endorses chronic low  back pain after back injury 2 years ago (previously discussed). Otherwise, he denies any other complaints on 14-point comprehensive review of systems.  PAST MEDICAL/SURGICAL HISTORY:                                                    I personally reviewed patient's past medical and surgical history with the patient at today's visit.  Patient Active Problem List   Diagnosis     Tinea versicolor     Thyroid nodule     Advanced directives, counseling/discussion     Erectile dysfunction, unspecified erectile dysfunction type     Prediabetes     Fatigue, unspecified type     Memory loss     Class 1 obesity due to excess calories with body mass index (BMI) of 31.0 to 31.9 in adult, unspecified whether serious comorbidity present     Past Medical History:   Diagnosis Date     Other and unspecified hyperlipidemia      Past Surgical History:   Procedure Laterality Date     COLONOSCOPY N/A 8/16/2016    Procedure: COLONOSCOPY;  Surgeon: Ketan Morgan MD;  Location: [facility]     SURGICAL HISTORY OF -       1 [place] tooth extraction      SURGICAL HISTORY OF -       Flexible Sigmoidoscopy      MEDICATIONS:                                                    I personally reviewed patient's medications and allergies with the patient at today's visit.  Current Outpatient Prescriptions on File Prior to Visit:  aspirin 81 MG tablet Take 81 mg by mouth daily Patient takes 1 tablet every other day   econazole nitrate 1 % cream Apply topically 2 times daily   fish oil-omega-3 fatty acids (FISH OIL) 1000 MG capsule Take 2-4 capsules by mouth daily.   Glucosamine-Chondroit-Vit C-Mn (GLUCOSAMINE 1500 COMPLEX) CAPS Take  by mouth.   ketoconazole (NIZORAL) 2 % shampoo Apply to the affected area and wash off after 5 minutes daily for first 2 weeks.   Multiple Vitamin (MULTI-VITAMIN) per tablet Take 1 tablet by mouth daily.   NEW MED Taking OTC medications that include Apple Vinegar, Med for his Sinus, Gummy Bears.   ibuprofen  (ADVIL,MOTRIN) 200 MG tablet Take 400 mg by mouth every 4 hours as needed for mild pain   sildenafil (VIAGRA) 100 MG tablet Take 0.5-1 tablets ( mg) by mouth daily as needed for erectile dysfunction Take 30 min to 4 hours before intercourse. (Patient not taking: Reported on 7/20/2018)   zolpidem (AMBIEN) 5 MG tablet Take tablet by mouth 15 minutes prior to sleep, for Sleep Study (Patient not taking: Reported on 7/20/2018)     ALLERGIES:                                                      Allergies   Allergen Reactions     Lipitor [Atorvastatin Calcium] Cramps     Severe muscle aches   Took 2 years to go a way   Jack Lugo MD       FAMILY/SOCIAL HISTORY:                                                    Family and social history was reviewed with the patient at today's visit.  Family history is positive for dementia.  , lives with his spouse.  Never smoker.  Drinks 3 beers per week.  Denies use of recreational drugs.  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  REVIEW OF SYSTEMS:                                                    Patient has completed a [facility] Patient Health History, including a 14-system review, which was personally reviewed, and pertinent positives are listed in HPI. He denies any additional problems on the further questioning.  EXAM:                                                    VITAL SIGNS:   /73 (BP Location: Left arm, Patient Position: Sitting, Cuff Size: Adult Regular)  Pulse 75  Temp 97.9  F (36.6  C) (Oral)  Resp 18  Wt 99.3 kg (219 lb)  SpO2 96%  BMI 31.65 kg/m2  Mini-Cog Assessment:  Mini Cog Assessment  Clock Draw Score: 2 Normal  3 Item Recall: 3 objects recalled  Mini Cog Total Score: 5    General: pt is in NAD, cooperative.  Skin: normal turgor, moist mucous membranes, no lesions/rashes noticed.  HEENT: ATNC, EOMI, PERRL, white sclera, normal conjunctiva, no nystagmus or ptosis. No carotid bruits  bilaterally.  Respiratory: lung sounds clear to auscultation bilaterally, no crackles, wheezes, rhonchi. Symmetric lung excursion, no accessory respiratory muscle use.  Cardiovascular: normal S1/S2, no murmurs/rubs/gallops.   Abdomen: Not distended.  : deferred.    Neurological:  Mental: alert, follows commands, Mini Cog Total Score: 5/5 with 3/3 on memory recall, no aphasia or dysarthria. Fund of knowledge is appropriate for age.  Cranial Nerves:  CN II: visual acuity - able to accurately count fingers with each eye. Visual fields intact, fundi: discs sharp, no papilledema and normal vessels bilaterally.  CN III, IV, VI: EOM intact, pupils equal and reactive  CN V: facial sensation nl  CN VII: face symmetric, no facial droop  CN VIII: hearing normal  CN IX: palate elevation symmetric, uvula at midline  CN XI SCM normal, shoulder shrug nl  CN XII: tongue midline  Motor: Strength: 5/5 in all major groups of all extremities. Normal tone, even with provoking maneuvers.  No persistent tremor.  No other abnormal movements. No pronator drift b/l.  Reflexes: Triceps, biceps, brachioradialis, patellar, and achilles reflexes normal and symmetric. No clonus noted. Toes are down-going b/l.   Sensory: temperature, light touch, pinprick, and vibration intact. Romberg: negative.  Coordination: FNF and heel-shin tests intact b/l.   Gait: Slightly stooped forward posture.  Normal stride length and arm swings, able to toe and heel walk, though has mild difficulty with tandem.  Turns with 1 step.  DATA:   LABS/IMAGING/OTHER STUDIES: I reviewed pertinent medical records, including personal review of brain MRI images, sleep study report, sleep medicine and primary care notes, and Care Everywhere, as detailed in the history of present illness.  ASSESSMENT and PLAN:      ASSESSMENT: David Clark is a 67 year old male patient this past medical history of obstructive sleep apnea, hyperlipidemia, and prediabetes, who presents with single  episode of confusion/disorientation in context of forgetfulness and possible abnormal EEG.    We had a prolonged discussion with the patient and his wife regarding his symptoms.  His neurological exam today is non-focal.  His brief cognitive screening test is normal.  Brain MRI, reviewed with the patient and his wife in detail, demonstrated mild cerebral atrophy, which is by itself nonspecific, and did not provide any possible explanations of his symptoms.    The clinical presentation of confusional spell might be consistent with transient global amnesia versus less likely focal seizure, though lack of clear description makes it more difficult to diagnose.  The fact that his sleep study EEG was potentially abnormal, will require more dedicated study, sleep deprived 3 hour EEG to evaluate for epileptiform discharges.    The reported forgetfulness does not currently affect his function.  He is still able to work as a research  without concerns for his performance.  His TSH is normal.  I do not think that we need to pursue further evaluation at this point.  However, the patient was advised to return for more dedicated memory testing if his cognitive complaints worsen.  At that time we might consider doing MoCA and neuropsychologic evaluation.    We also discussed his sleep movements and lack of atonia during REM sleep that might be suggestive of the future development of REM behavior sleep disorder, a condition that might increase the risks of Parkinson's disease or other related movement disorders.  At the present time, I do not see any evidence of extrapyramidal condition, but the patient was advised to return for additional evaluation if he develops such neurological symptoms in the future.    Transient left arm numbness and weakness did not recur for 6 months.  The patient was advised to call 911 and present to emergency room if this happens again.  It is possible that he suffered a transient ischemic  attack or had transient episode of brachial plexopathy/focal neuropathy related to positioning in sleep, but it would be difficult to tell for sure.  He is on baby aspirin for stroke prevention.    DIAGNOSES:    ICD-10-CM    1. Spell of altered cognition R41.89 EEG EXTENDED MONITORING > 1HR   2. Forgetfulness R68.89      PLAN: At today's visit we thoroughly discussed various diagnostic possibilities for patient's symptoms and the reasons for work-up, which includes:  Orders Placed This Encounter   Procedures     EEG EXTENDED MONITORING > 1HR     No new medications were ordered.    Additional recommendations after the work-up.    Next follow-up appointment is on as needed basis (based on results).    I advised the patient to call me with any questions or concerns.    Total Time:  82 minutes with > 50% spent counseling the patient and his wife on stated above assessment and recommendations, including nature of the differential diagnosis, needed w/u, proposed plan, and prognosis.  Additional time was used to answer numerous questions regarding patient's condition and the plan.    Albaro Victoria MD  / Neurology  [place]  (Chart documentation was completed in part with Dragon voice-recognition software. Even though reviewed, some grammatical, spelling, and word errors may remain.)

## 2018-07-20 NOTE — PATIENT INSTRUCTIONS
AFTER VISIT SUMMARY (AVS):    At today's visit we discussed various diagnostic possibilities for your symptoms and the reasons for work-up, which includes:  Orders Placed This Encounter   Procedures     EEG EXTENDED MONITORING > 1HR     No new medications were ordered.    Additional recommendations after the work-up.    Next follow-up appointment is on as needed basis (based on results).    Please do not hesitate to call me with any questions or concerns.    Thanks.

## 2018-07-20 NOTE — MR AVS SNAPSHOT
After Visit Summary   7/20/2018    David Clark    MRN: [id number]           Patient Information     Date Of Birth          1950        Visit Information        Provider Department      7/20/2018 3:00 PM Albaro Victoria MD [facility]        Today's Diagnoses     Spell of altered cognition    -  1    Forgetfulness          Care Instructions    AFTER VISIT SUMMARY (AVS):    At today's visit we discussed various diagnostic possibilities for your symptoms and the reasons for work-up, which includes:  Orders Placed This Encounter   Procedures     EEG EXTENDED MONITORING > 1HR     No new medications were ordered.    Additional recommendations after the work-up.    Next follow-up appointment is on as needed basis (based on results).    Please do not hesitate to call me with any questions or concerns.    Thanks.           Follow-ups after your visit        Follow-up notes from your care team     Return if symptoms worsen or fail to improve.      Your next 10 appointments already scheduled     Jul 26, 2018 10:00 AM CDT   EEG with [facility]   [facility] ([facility])    [address] [facility] [address]   [phone]            Aug 29, 2018 10:30 AM CDT   Return Sleep Patient with ARTUR Mejia   [facility] ([facility])    [address]  [facility] [id number] [phone]              Future tests that were ordered for you today     Open Future Orders        Priority Expected Expires Ordered    EEG EXTENDED MONITORING > 1HR Routine  8/17/2018 7/20/2018            Who to contact     If you have questions or need follow up information about today's clinic visit or your schedule please contact [facility] directly at [phone].  Normal or non-critical lab and imaging results will be communicated to you by MyChart, letter or phone within 4 business days after the clinic has  received the results. If you do not hear from us within 7 days, please contact the clinic through [employer] or phone. If you have a critical or abnormal lab result, we will notify you by phone as soon as possible.  Submit refill requests through [employer] or call your pharmacy and they will forward the refill request to us. Please allow 3 business days for your refill to be completed.          Additional Information About Your Visit        [facility] Information     [employer] gives you secure access to your electronic health record. If you see a primary care provider, you can also send messages to your care team and make appointments. If you have questions, please call your primary care clinic.  If you do not have a primary care provider, please call [phone] and they will assist you.        Care EveryWhere ID     This is your Care EveryWhere ID. This could be used by other organizations to access your [facility] medical records  [id number]        Your Vitals Were     Pulse Temperature Respirations Pulse Oximetry BMI (Body Mass Index)       75 97.9  F (36.6  C) (Oral) 18 96% 31.65 kg/m2        Blood Pressure from Last 3 Encounters:   07/20/18 133/73   07/17/18 113/67   07/10/18 111/68    Weight from Last 3 Encounters:   07/20/18 99.3 kg (219 lb)   07/17/18 98.9 kg (218 lb)   07/10/18 98.9 kg (218 lb)               Primary Care Provider Office Phone # Fax #    Gal Felix Robison [phone] [address] [address]        Equal Access to Services     KENDAL BUSCH : Hadii aad ku hadasho Soomaali, waaxda luqadaha, qaybta kaalmada adeegyada, iban call. So [facility] [phone].    ATENCIÓN: Si habla español, tiene a delgadillo disposición servicios gratuitos de asistencia lingüística. Llame al [phone].    We comply with applicable federal civil rights laws and [place] laws. We do not discriminate on the basis of race, color, national origin,  age, disability, sex, sexual orientation, or gender identity.            Thank you!     Thank you for choosing [facility]  for your care. Our goal is always to provide you with excellent care. Hearing back from our patients is one way we can continue to improve our services. Please take a few minutes to complete the written survey that you may receive in the mail after your visit with us. Thank you!             Your Updated Medication List - Protect others around you: Learn how to safely use, store and throw away your medicines at www.disposemymeds.org.          This list is accurate as of 7/20/18  3:40 PM.  Always use your most recent med list.                   Brand Name Dispense Instructions for use Diagnosis    aspirin 81 MG tablet     30 tablet    Take 81 mg by mouth daily Patient takes 1 tablet every other day        econazole nitrate 1 % cream     60 g    Apply topically 2 times daily    TV (tinea versicolor)       fish oil-omega-3 fatty acids 1000 MG capsule      Take 2-4 capsules by mouth daily.        GLUCOSAMINE 1500 COMPLEX Caps      Take  by mouth.        ibuprofen 200 MG tablet    ADVIL/MOTRIN     Take 400 mg by mouth every 4 hours as needed for mild pain        ketoconazole 2 % shampoo    NIZORAL    240 mL    Apply to the affected area and wash off after 5 minutes daily for first 2 weeks.    TV (tinea versicolor)       Multi-vitamin Tabs tablet   Generic drug:  multivitamin, therapeutic with minerals      Take 1 tablet by mouth daily.        NEW MED      Taking OTC medications that include Apple Vinegar, Med for his Sinus, Gummy Bears.    Routine history and physical examination of adult       sildenafil 100 MG tablet    VIAGRA    6 tablet    Take 0.5-1 tablets ( mg) by mouth daily as needed for erectile dysfunction Take 30 min to 4 hours before intercourse.    Erectile dysfunction, unspecified erectile dysfunction type       zolpidem 5 MG tablet    AMBIEN    1 tablet     Take tablet by mouth 15 minutes prior to sleep, for Sleep Study

## 2018-07-23 ENCOUNTER — MYC MEDICAL ADVICE (OUTPATIENT)
Dept: SLEEP MEDICINE | Facility: CLINIC | Age: 68
End: 2018-07-23

## 2018-07-23 DIAGNOSIS — G47.33 OSA (OBSTRUCTIVE SLEEP APNEA): Primary | ICD-10-CM

## 2018-07-23 NOTE — TELEPHONE ENCOUNTER
From: David Clark  To: Melanie Marinelli PA  Sent: 7/23/2018 8:42 AM CDT  Subject: A follow-up question for a visit within the past seven days    Emerald Cerda at [facility] advised me that I do not fit criteria for Medicare to pay for the CPAP that you ordered following my sleep study. I am concerned about the small amount of actual sleep I experienced per your results and as to how we can obtain a CPAP machine? Can you let me know if there are any other options to fit criteria for Medicare to cover this CPAP machine?? Thank you. David Clark

## 2018-07-26 ENCOUNTER — HOSPITAL ENCOUNTER (OUTPATIENT)
Dept: NEUROLOGY | Facility: CLINIC | Age: 68
Discharge: HOME | End: 2018-07-26
Attending: PSYCHIATRY & NEUROLOGY | Admitting: PSYCHIATRY & NEUROLOGY
Payer: MEDICARE

## 2018-07-26 DIAGNOSIS — R41.89 SPELL OF ALTERED COGNITION: ICD-10-CM

## 2018-07-26 PROCEDURE — 95813 EEG EXTND MNTR 61-119 MIN: CPT

## 2018-07-26 PROCEDURE — 95819 EEG AWAKE AND ASLEEP: CPT

## 2018-07-26 PROCEDURE — 95813 EEG EXTND MNTR 61-119 MIN: CPT | Mod: 26 | Performed by: PSYCHIATRY & NEUROLOGY

## 2018-07-27 ENCOUNTER — TELEPHONE (OUTPATIENT)
Dept: NEUROLOGY | Facility: CLINIC | Age: 68
End: 2018-07-27

## 2018-07-27 ENCOUNTER — DOCUMENTATION ONLY (OUTPATIENT)
Dept: SLEEP MEDICINE | Facility: CLINIC | Age: 68
End: 2018-07-27
Payer: MEDICARE

## 2018-07-27 NOTE — PROGRESS NOTES
Patient was offered choice of vendor and chose [facility].  Patient David Clark was set up at [facility]  on July 27, 2018. Patient received a Resmed AirSense 10 Auto. Pressures were set at 5-15 cm H2O.   Patient s ramp is 5 cm H2O for Off and FLEX/EPR is EPR, 2.  Patient received a [employer] Mask name: DREAMWEAR  Nasal mask Size Medium, heated tubing and heated humidifier.  Patient is enrolled in the [employer] Program and does need to meet compliance. Patient has a follow up on 08/29/2018 with ARTUR Dumont.    Smiley Ivy

## 2018-07-27 NOTE — PROCEDURES
OUTPATIENT SLEEP-DEPRIVED EEG REPORT.    Patient Name:  David Clark  MRN:     [id number]  : `    1950    EEG#: [id number].        DATE OF RECORDIN2018.       DURATION OF RECORDIN hour 21 minutes and 58 seconds.        CLINICAL SUMMARY: David Clark is 67 year old male patient with past medical history of obstructive sleep apnea, hyperlipidemia, and prediabetes, who was recently seen in neurology clinic for abnormal EEG and transient episode of confusion. This study was ordered to evaluate for seizures and epileptiform abnormalities. On the day of the study, the patient is not reported to take anti-seizure drugs amongst other listed in medical record medications.      INTERICTAL EEG ACTIVITIES: Most of EEG recording is obtained in drowsiness and sleep.  During brief period of maximal wakefulness, background consists of symmetric moderate amplitude up to 9 Hz posterior dominant rhythm, that attenuates with eye opening and intermixed with mild diffuse theta slowing. There is no focal slowing during waking seen. Drowsiness is manifested by predominance of centrally maximum semirhythmic theta slowing and dropout of posterior dominant rhythm. Stage II sleep is characterized by symmetric sleep spindles, K-complexes, and vertex waves.  It was intermixed with elements of slow-wave sleep, characterized by a mixture of delta and theta frequencies.  Intermittent photic stimulation is performed and does not induce any epileptiform abnormalities. Hyperventilation is not performed.        INTERICTAL EPILEPTIFORM DISCHARGES: None.        ICTAL RECORDINGS: No electrographic or clinical seizures and no paroxysmal behavioral events are recorded during this study.        IMPRESSION: This outpatient sleep-deprived longer than 1 hour EEG study is mildly abnormal due to mild diffuse slowing that might be consistent with mild electrographic encephalopathy of nonspecific etiology, though most of the recording was obtained  in drowsiness and sleep.  No interictal epileptiform discharges, no electrographic or clinical seizures, and no paroxysmal behavioral events are noted during the recording.  The study was prematurely terminated for patient-related reasons.  Clinical correlation is recommended.  Albaro Victoria MD.

## 2018-07-27 NOTE — TELEPHONE ENCOUNTER
Please advise the patient that his EEG was mildly abnormal due to generalized slowing (nonspecific finding with multiple causes, including sleep deprivation), but no signs of increased brain irritability (epileptiform discharges) or seizures were seen.   Thanks,   Albaro Victoria MD.      Called pt and regard information above and pt verbalized understanding and had no further questions.    Janiya Ferris MA

## 2018-07-27 NOTE — TELEPHONE ENCOUNTER
Patient scheduled for CPAP setup with [facility] in [facility], 07/27/18 at 2:00PM.   Solange Castellano on 7/27/2018 at 12:33 PM

## 2018-07-30 ENCOUNTER — DOCUMENTATION ONLY (OUTPATIENT)
Dept: SLEEP MEDICINE | Facility: CLINIC | Age: 68
End: 2018-07-30
Payer: MEDICARE

## 2018-07-30 NOTE — PROGRESS NOTES
3 DAY STM VISIT    Diagnostic AHI: 8.9     Patient contacted for 3 day STM visit  Subjective measures:  Patient sleeping better getting up less during the night getting more sleep he's not sure if its quality sleep.      Device type: Auto-CPAP  PAP settings from order::  CPAP min 5 cm  H20       CPAP max 15 cm  H20  Mask type:    Nasal Mask     Device settings from machine      Min CPAP 5.0            Max CPAP 15.0      Assessment: Nightly usage over four hours.  Action plan: Pt to have f/u 14 day STM visit.  Patient has a follow up visit scheduled:   yes within 31-90 days of set up.

## 2018-08-08 ENCOUNTER — MYC MEDICAL ADVICE (OUTPATIENT)
Dept: SLEEP MEDICINE | Facility: CLINIC | Age: 68
End: 2018-08-08

## 2018-08-08 DIAGNOSIS — G47.33 OBSTRUCTIVE SLEEP APNEA: Primary | ICD-10-CM

## 2018-08-09 NOTE — TELEPHONE ENCOUNTER
From: David Clark  To: Melanie Marinelli PA  Sent: 8/8/2018 3:12 PM CDT  Subject: Question about an upcoming visit    As you know, I have started using the CPap machine and I can tell you it is helping my mood and I am getting a much more restful sleep than in the past. I do recall you suggested during our last appointment that I also try to elevate the HOB. I cannot get it elevated on the kind of bed I have, but I very much do think that such positioning will be of help in my breathing and I am wondering if you can prescribe a sleep wedge (pillow) for nighttime use. If insurance doesn't cover it, I can then use [employer] if I have an MD Rx. Thank you. I will see you for our followup visit within the next few weeks. David Clark

## 2018-08-10 ENCOUNTER — DOCUMENTATION ONLY (OUTPATIENT)
Dept: SLEEP MEDICINE | Facility: CLINIC | Age: 68
End: 2018-08-10
Payer: MEDICARE

## 2018-08-10 NOTE — PROGRESS NOTES
14 DAY STM VISIT    Diagnostic AHI: 8.9 PSG    Subjective measures:   Patient doing well sleeping better feeling more rested and getting up less to use the bathroom.     Assessment: Pt meeting objective benchmarks.  Patient meeting subjective benchmarks.   Action plan: Pt to have 30 day STM visit.    Device type: Auto-CPAP  PAP settings: CPAP min 5.0 cm  H20     CPAP max 15.0 cm  H20    95th% pressure 10.4 cm   Mask type:  Nasal Mask  Objective measures: 14 day rolling measures      Compliance  100 %      Leak  15.51 lpm  last  upload      AHI 6.46   last  upload      Average number of minutes 439     Average hours of usage 7.3          Objective measure goal  Compliance   Goal >70%  Leak   Goal < 24 lpm  AHI  Goal < 5  Usage  Goal >240

## 2018-08-28 ENCOUNTER — DOCUMENTATION ONLY (OUTPATIENT)
Dept: SLEEP MEDICINE | Facility: CLINIC | Age: 68
End: 2018-08-28

## 2018-08-28 NOTE — PROGRESS NOTES
30 DAY STM VISIT    Diagnostic AHI: 8.9  PSG    Subjective measures:   Pt states things are going well and has no issues or complaints.  Pt is benefiting from therapy.      Assessment: Pt meeting objective benchmarks.  Patient meeting subjective benchmarks.   Action plan: pt to have 6 month STM visit  Patient has a follow up visit with ARTUR Dumont on 8/29/2018.   Device type: Auto-CPAP  PAP settings: CPAP min 5.0 cm  H20      CPAP max 15.0 cm  H20    95th% pressure 9.1 cm  H20   Mask type:  Nasal Mask  Objective measures: 14 day rolling measures      Compliance  100 %      Leak  12.56 lpm  last  upload      AHI 3.87   last  upload      Average number of minutes 468      Objective measure goal  Compliance   Goal >70%  Leak   Goal < 24 lpm  AHI  Goal < 5  Usage  Goal >240

## 2018-08-29 ENCOUNTER — OFFICE VISIT (OUTPATIENT)
Dept: SLEEP MEDICINE | Facility: CLINIC | Age: 68
End: 2018-08-29
Payer: MEDICARE

## 2018-08-29 VITALS
OXYGEN SATURATION: 97 % | WEIGHT: 224 LBS | HEART RATE: 78 BPM | BODY MASS INDEX: 32.07 KG/M2 | SYSTOLIC BLOOD PRESSURE: 126 MMHG | DIASTOLIC BLOOD PRESSURE: 72 MMHG | HEIGHT: 70 IN

## 2018-08-29 DIAGNOSIS — G47.33 OSA (OBSTRUCTIVE SLEEP APNEA): Primary | ICD-10-CM

## 2018-08-29 PROBLEM — E66.09 CLASS 1 OBESITY DUE TO EXCESS CALORIES WITH BODY MASS INDEX (BMI) OF 31.0 TO 31.9 IN ADULT, UNSPECIFIED WHETHER SERIOUS COMORBIDITY PRESENT: Chronic | Status: ACTIVE | Noted: 2018-07-10

## 2018-08-29 PROBLEM — E66.811 CLASS 1 OBESITY DUE TO EXCESS CALORIES WITH BODY MASS INDEX (BMI) OF 31.0 TO 31.9 IN ADULT, UNSPECIFIED WHETHER SERIOUS COMORBIDITY PRESENT: Chronic | Status: ACTIVE | Noted: 2018-07-10

## 2018-08-29 PROCEDURE — 99213 OFFICE O/P EST LOW 20 MIN: CPT | Performed by: PHYSICIAN ASSISTANT

## 2018-08-29 NOTE — MR AVS SNAPSHOT
After Visit Summary   8/29/2018    David Clark    MRN: [id number]           Patient Information     Date Of Birth          1950        Visit Information        Provider Department      8/29/2018 10:30 AM Melanie Marinelli PA [facility]        Today's Diagnoses     EDY (obstructive sleep apnea)    -  1      Care Instructions        Your Body mass index is 32.37 kg/(m^2).  Weight management is a personal decision.  If you are interested in exploring weight loss strategies, the following discussion covers the approaches that may be successful. Body mass index (BMI) is one way to tell whether you are at a healthy weight, overweight, or obese. It measures your weight in relation to your height.  A BMI of 18.5 to 24.9 is in the healthy range. A person with a BMI of 25 to 29.9 is considered overweight, and someone with a BMI of 30 or greater is considered obese. More than two-thirds of American adults are considered overweight or obese.  Being overweight or obese increases the risk for further weight gain. Excess weight may lead to heart disease and diabetes.  Creating and following plans for healthy eating and physical activity may help you improve your health.  Weight control is part of healthy lifestyle and includes exercise, emotional health, and healthy eating habits. Careful eating habits lifelong are the mainstay of weight control. Though there are significant health benefits from weight loss, long-term weight loss with diet alone may be very difficult to achieve- studies show long-term success with dietary management in less than 10% of people. Attaining a healthy weight may be especially difficult to achieve in those with severe obesity. In some cases, medications, devices and surgical management might be considered.  What can you do?  If you are overweight or obese and are interested in methods for weight loss, you should discuss this with your provider.     Consider reducing  daily calorie intake by 500 calories.     Keep a food journal.     Avoiding skipping meals, consider cutting portions instead.    Diet combined with exercise helps maintain muscle while optimizing fat loss. Strength training is particularly important for building and maintaining muscle mass. Exercise helps reduce stress, increase energy, and improves fitness. Increasing exercise without diet control, however, may not burn enough calories to loose weight.       Start walking three days a week 10-20 minutes at a time    Work towards walking thirty minutes five days a week     Eventually, increase the speed of your walking for 1-2 minutes at time    In addition, we recommend that you review healthy lifestyles and methods for weight loss available through the National Institutes of Health patient information sites:  http://win.niddk.nih.gov/publications/index.htm    And look into health and wellness programs that may be available through your health insurance provider, employer, local community center, or titi club.    Weight management plan: Patient was referred to their PCP to discuss a diet and exercise plan.            Follow-ups after your visit        Follow-up notes from your care team     Return in 1 year (on 8/29/2019) for PAP follow up.      Your next 10 appointments already scheduled     Sep 14, 2018  9:00 AM CDT   PHYSICAL with Steve Patterson MD   [facility]    [address] [address] [phone]              Who to contact     If you have questions or need follow up information about today's clinic visit or your schedule please contact [facility] directly at [phone].  Normal or non-critical lab and imaging results will be communicated to you by MyChart, letter or phone within 4 business days after the clinic has received the results. If you do not hear from us within 7 days, please contact the clinic through  "MyChart or phone. If you have a critical or abnormal lab result, we will notify you by phone as soon as possible.  Submit refill requests through [employer] or call your pharmacy and they will forward the refill request to us. Please allow 3 business days for your refill to be completed.          Additional Information About Your Visit        [employer] Information     [employer] gives you secure access to your electronic health record. If you see a primary care provider, you can also send messages to your care team and make appointments. If you have questions, please call your primary care clinic.  If you do not have a primary care provider, please call [phone] and they will assist you.        Care EveryWhere ID     This is your Care EveryWhere ID. This could be used by other organizations to access your [facility] medical records  [id number]        Your Vitals Were     Pulse Height Pulse Oximetry BMI (Body Mass Index)          78 1.772 m (5' 9.75\") 97% 32.37 kg/m2         Blood Pressure from Last 3 Encounters:   08/29/18 126/72   07/20/18 133/73   07/17/18 113/67    Weight from Last 3 Encounters:   08/29/18 101.6 kg (224 lb)   07/20/18 99.3 kg (219 lb)   07/17/18 98.9 kg (218 lb)              We Performed the Following     Comprehensive DME        Primary Care Provider Office Phone # Fax #    Steve Patterson [phone] [address] [address]        Equal Access to Services     KENDAL BUSCH AH: Hadii graciela ku hadasho Soomaali, waaxda luqadaha, qaybta kaalmada adeegyada, iban frye . So [facility] [phone].    ATENCIÓN: Si habla español, tiene a delgadillo disposición servicios gratuitos de asistencia lingüística. Sean al [phone].    We comply with applicable federal civil rights laws and [place] laws. We do not discriminate on the basis of race, color, national origin, age, disability, sex, sexual orientation, or gender identity.            Thank you!  "    Thank you for choosing [facility]  for your care. Our goal is always to provide you with excellent care. Hearing back from our patients is one way we can continue to improve our services. Please take a few minutes to complete the written survey that you may receive in the mail after your visit with us. Thank you!             Your Updated Medication List - Protect others around you: Learn how to safely use, store and throw away your medicines at www.disposemymeds.org.          This list is accurate as of 8/29/18 11:03 AM.  Always use your most recent med list.                   Brand Name Dispense Instructions for use Diagnosis    aspirin 81 MG tablet     30 tablet    Take 81 mg by mouth daily Patient takes 1 tablet every other day        econazole nitrate 1 % cream     60 g    Apply topically 2 times daily    TV (tinea versicolor)       fish oil-omega-3 fatty acids 1000 MG capsule      Take 2-4 capsules by mouth daily.        GLUCOSAMINE 1500 COMPLEX Caps      Take  by mouth.        ibuprofen 200 MG tablet    ADVIL/MOTRIN     Take 400 mg by mouth every 4 hours as needed for mild pain        ketoconazole 2 % shampoo    NIZORAL    240 mL    Apply to the affected area and wash off after 5 minutes daily for first 2 weeks.    TV (tinea versicolor)       Multi-vitamin Tabs tablet   Generic drug:  multivitamin, therapeutic with minerals      Take 1 tablet by mouth daily.        NEW MED      Taking OTC medications that include Apple Vinegar, Med for his Sinus, Gummy Bears.    Routine history and physical examination of adult       sildenafil 100 MG tablet    VIAGRA    6 tablet    Take 0.5-1 tablets ( mg) by mouth daily as needed for erectile dysfunction Take 30 min to 4 hours before intercourse.    Erectile dysfunction, unspecified erectile dysfunction type       zolpidem 5 MG tablet    AMBIEN    1 tablet    Take tablet by mouth 15 minutes prior to sleep, for Sleep Study

## 2018-08-29 NOTE — PATIENT INSTRUCTIONS
Your Body mass index is 32.37 kg/(m^2).  Weight management is a personal decision.  If you are interested in exploring weight loss strategies, the following discussion covers the approaches that may be successful. Body mass index (BMI) is one way to tell whether you are at a healthy weight, overweight, or obese. It measures your weight in relation to your height.  A BMI of 18.5 to 24.9 is in the healthy range. A person with a BMI of 25 to 29.9 is considered overweight, and someone with a BMI of 30 or greater is considered obese. More than two-thirds of American adults are considered overweight or obese.  Being overweight or obese increases the risk for further weight gain. Excess weight may lead to heart disease and diabetes.  Creating and following plans for healthy eating and physical activity may help you improve your health.  Weight control is part of healthy lifestyle and includes exercise, emotional health, and healthy eating habits. Careful eating habits lifelong are the mainstay of weight control. Though there are significant health benefits from weight loss, long-term weight loss with diet alone may be very difficult to achieve- studies show long-term success with dietary management in less than 10% of people. Attaining a healthy weight may be especially difficult to achieve in those with severe obesity. In some cases, medications, devices and surgical management might be considered.  What can you do?  If you are overweight or obese and are interested in methods for weight loss, you should discuss this with your provider.     Consider reducing daily calorie intake by 500 calories.     Keep a food journal.     Avoiding skipping meals, consider cutting portions instead.    Diet combined with exercise helps maintain muscle while optimizing fat loss. Strength training is particularly important for building and maintaining muscle mass. Exercise helps reduce stress, increase energy, and improves fitness.  Increasing exercise without diet control, however, may not burn enough calories to loose weight.       Start walking three days a week 10-20 minutes at a time    Work towards walking thirty minutes five days a week     Eventually, increase the speed of your walking for 1-2 minutes at time    In addition, we recommend that you review healthy lifestyles and methods for weight loss available through the National Institutes of Health patient information sites:  http://win.niddk.nih.gov/publications/index.htm    And look into health and wellness programs that may be available through your health insurance provider, employer, local community center, or titi club.    Weight management plan: Patient was referred to their PCP to discuss a diet and exercise plan.

## 2018-08-29 NOTE — PROGRESS NOTES
Obstructive Sleep Apnea - PAP Follow-Up Visit:    Chief Complaint   Patient presents with     CPAP Follow Up     No concerns with machine. No concerns with sleep.        David Clark comes in today for follow-up of their mild sleep apnea, managed with CPAP.     David Clark had a sleep study done on 7/11/2018 at the [facility] for memory concerns, snoring, witnessed apnea, daytime sleepiness, morning headaches, crowded oropharynx, large neck circumference and co-morbid DMII and HTN.    PSG date 7/11/2018-The combined apnea/hypopnea index was 8.9 events per hour (central apnea/hypopnea index was 2.0 events per hour). The REM AHI was 23.2 events per hour. The supine AHI was 10.7 events per hour. The RERA index was 4.3 events per hour. The RDI was 13.2 events per hour.  Baseline oxygen saturation was 93.0%. Lowest oxygen saturation was 87.5%. Time spent less than or equal to 88% was 0.1 minutes. Time spent less than or equal to 89% was 0.9 minutes. Movement Activity: Frequent PLM s (index 90) with rare cortical arousals (index 5.4). Small number of REM epochs with phasic lack of atonia, but would not appear to fit criteria for REM-sleep behavior disorder.    Overall, the patient rates his experience with PAP as 10 (0 poor, 10 great). The mask is comfortable. The mask is not leaking.  He is not snoring with the mask on. He is not having gasp arousals. He is not having any oral or nasal dryness. The pressure settings feel low.    His PAP interface is Nasal Pillows.    He does feel rested in the morning.    Total score - [place]: 5 (8/29/2018 10:00 AM)      LISA Total Score: 2    ResMed   Auto-PAP 5.0 - 15.0 cmH2O 30 day usage data:    100% of days with > 4 hours of use. 0/30 days with no use.   Average use 448 minutes per day.   95%ile Leak 15 L/min.   CPAP 95% pressure 9.4 cm.   AHI 4.83 events per hour.     He reports CPAP is going very well and noted that he was waking up to 5 times a night to go to  "the bathroom before CPAP. He is waking one time a night now.       Past medical/surgical history, family history, social history, medications and allergies were reviewed.      Problem List:  Patient Active Problem List    Diagnosis Date Noted     EDY (obstructive sleep apnea) 08/29/2018     Priority: Medium     PSG date 7/11/2018-The combined apnea/hypopnea index was 8.9 events per hour (central apnea/hypopnea index was 2.0 events per hour). The REM AHI was 23.2 events per hour. The supine AHI was 10.7 events per hour. The RERA index was 4.3 events per hour. The RDI was 13.2 events per hour.  Baseline oxygen saturation was 93.0%. Lowest oxygen saturation was 87.5%. Time spent less than or equal to 88% was 0.1 minutes. Time spent less than or equal to 89% was 0.9 minutes. Movement Activity: Frequent PLM s (index 90) with rare cortical arousals (index 5.4). Small number of REM epochs with phasic lack of atonia, but would not appear to fit criteria for REM-sleep behavior disorder.        Class 1 obesity due to excess calories with body mass index (BMI) of 31.0 to 31.9 in adult, unspecified whether serious comorbidity present 07/10/2018     Priority: Medium     Memory loss 06/27/2018     Priority: Medium     Erectile dysfunction, unspecified erectile dysfunction type 07/29/2016     Priority: Medium     Prediabetes 07/29/2016     Priority: Medium     Fatigue, unspecified type 07/29/2016     Priority: Medium     Advanced directives, counseling/discussion 05/29/2013     Priority: Medium     Patient does not have an Advance/Health Care Directive (HCD), requests blank HCD form.    Nazia Spence  May 29, 2013         Thyroid nodule 11/22/2010     Priority: Medium     Tinea versicolor 09/23/2010     Priority: Medium        /72  Pulse 78  Ht 1.772 m (5' 9.75\")  Wt 101.6 kg (224 lb)  SpO2 97%  BMI 32.37 kg/m2    Impression/Plan:    Severe Sleep apnea-  Tolerating PAP well. Daytime symptoms are improved.   Change to " auto-CPAP 8-15 cm/H20.  Comprehensive DME.    David Clark will follow up in about 1 year.    Fifteen minutes spent with patient, all of which were spent face-to-face counseling, consulting, coordinating plan of care regarding EDY.      Melanie Marinelli PA-C

## 2018-09-14 ENCOUNTER — OFFICE VISIT (OUTPATIENT)
Dept: FAMILY MEDICINE | Facility: CLINIC | Age: 68
End: 2018-09-14
Payer: MEDICARE

## 2018-09-14 VITALS
SYSTOLIC BLOOD PRESSURE: 114 MMHG | DIASTOLIC BLOOD PRESSURE: 70 MMHG | WEIGHT: 219 LBS | OXYGEN SATURATION: 94 % | HEIGHT: 70 IN | BODY MASS INDEX: 31.35 KG/M2 | HEART RATE: 60 BPM | TEMPERATURE: 96.8 F

## 2018-09-14 DIAGNOSIS — Z00.00 ROUTINE HISTORY AND PHYSICAL EXAMINATION OF ADULT: Primary | ICD-10-CM

## 2018-09-14 DIAGNOSIS — Z23 ENCOUNTER FOR IMMUNIZATION: ICD-10-CM

## 2018-09-14 DIAGNOSIS — Z23 NEED FOR PROPHYLACTIC VACCINATION AND INOCULATION AGAINST INFLUENZA: ICD-10-CM

## 2018-09-14 DIAGNOSIS — E78.5 HYPERLIPIDEMIA LDL GOAL <130: ICD-10-CM

## 2018-09-14 PROBLEM — R41.3 MEMORY LOSS: Status: RESOLVED | Noted: 2018-06-27 | Resolved: 2018-09-14

## 2018-09-14 LAB
CHOLEST SERPL-MCNC: 216 MG/DL
HDLC SERPL-MCNC: 36 MG/DL
LDLC SERPL CALC-MCNC: 149 MG/DL
NONHDLC SERPL-MCNC: 180 MG/DL
TRIGL SERPL-MCNC: 156 MG/DL

## 2018-09-14 PROCEDURE — G0008 ADMIN INFLUENZA VIRUS VAC: HCPCS | Performed by: FAMILY MEDICINE

## 2018-09-14 PROCEDURE — G0009 ADMIN PNEUMOCOCCAL VACCINE: HCPCS | Performed by: FAMILY MEDICINE

## 2018-09-14 PROCEDURE — 80061 LIPID PANEL: CPT | Performed by: FAMILY MEDICINE

## 2018-09-14 PROCEDURE — 90732 PPSV23 VACC 2 YRS+ SUBQ/IM: CPT | Performed by: FAMILY MEDICINE

## 2018-09-14 PROCEDURE — G0439 PPPS, SUBSEQ VISIT: HCPCS | Performed by: FAMILY MEDICINE

## 2018-09-14 PROCEDURE — 36415 COLL VENOUS BLD VENIPUNCTURE: CPT | Performed by: FAMILY MEDICINE

## 2018-09-14 PROCEDURE — 90662 IIV NO PRSV INCREASED AG IM: CPT | Performed by: FAMILY MEDICINE

## 2018-09-14 NOTE — PROGRESS NOTES
SUBJECTIVE:   David Clark is a 68 year old male who presents for Preventive Visit.    Are you in the first 12 months of your Medicare Part B coverage?  No    Healthy Habits:    Do you get at least three servings of calcium containing foods daily (dairy, green leafy vegetables, etc.)? Yes    Amount of exercise or daily activities, outside of work: Yes, has been doing more exercise with his back feeling better, 4-5 days.    Problems taking medications regularly No    Medication side effects: No    Have you had an eye exam in the past two years? No, feels he is having a harder time with reading.    Do you see a dentist twice per year? yes    Do you have sleep apnea, excessive snoring or daytime drowsiness? He has the CPAP machine-for the past month to month and a half.      Ability to successfully perform activities of daily living: Yes, no assistance needed    Home safety:  lack of grab bars in the bathroom     Hearing impairment: No per patient.    Fall risk:  Fallen 2 or more times in the past year?: No  Any fall with injury in the past year?: No      COGNITIVE SCREEN  1) Repeat 3 items (Leader, Season, Table)    2) Clock draw: NORMAL  3) 3 item recall: Recalls 3 objects  Results: 3 items recalled: COGNITIVE IMPAIRMENT LESS LIKELY    Mini-CogTM Copyright S Corey. Licensed by the author for use in [facility]; reprinted with permission (travis@.[email or web address]). All rights reserved.        Chief Complaint   Patient presents with     Wellness Visit     Wellness physical exam.     Lipids     He is fasting today for labs if needed.       Reviewed and updated as needed this visit by clinical staff  Tobacco  Allergies  Meds  Med Hx  Surg Hx  Fam Hx  Soc Hx      Reviewed and updated as needed this visit by Provider      Social History   Substance Use Topics     Smoking status: Never Smoker     Smokeless tobacco: Never Used     Alcohol use Yes      Comment: occassionally     If you drink alcohol do you typically  have >3 drinks per day or >7 drinks per week? No                        Today's PHQ-2 Score:   PHQ-2 ( 1999 Pfizer) 9/14/2018 9/13/2017   Q1: Little interest or pleasure in doing things 0 0   Q2: Feeling down, depressed or hopeless 0 0   PHQ-2 Score 0 0       Current Outpatient Prescriptions:      aspirin 81 MG tablet, Take 81 mg by mouth daily Patient takes 1 tablet every other day, Disp: 30 tablet, Rfl:      econazole nitrate 1 % cream, Apply topically 2 times daily, Disp: 60 g, Rfl: 3     fish oil-omega-3 fatty acids (FISH OIL) 1000 MG capsule, Take 2-4 capsules by mouth daily., Disp: , Rfl:      Glucosamine-Chondroit-Vit C-Mn (GLUCOSAMINE 1500 COMPLEX) CAPS, Take  by mouth., Disp: , Rfl:      ketoconazole (NIZORAL) 2 % shampoo, Apply to the affected area and wash off after 5 minutes daily for first 2 weeks., Disp: 240 mL, Rfl: 11     Multiple Vitamin (MULTI-VITAMIN) per tablet, Take 1 tablet by mouth daily., Disp: , Rfl:      sildenafil (VIAGRA) 100 MG tablet, Take 0.5-1 tablets ( mg) by mouth daily as needed for erectile dysfunction Take 30 min to 4 hours before intercourse., Disp: 6 tablet, Rfl: 11    Do you feel safe in your environment - Yes    Do you have a Health Care Directive?: Yes: Patient states has Advance Directive and will bring in a copy to clinic.    Current providers sharing in care for this patient include:   Patient Care Team:  Steve Patterson MD as PCP - General (Family Practice)    The following health maintenance items are reviewed in Epic and correct as of today:  Health Maintenance   Topic Date Due     HEPATITIS C SCREENING  07/31/1969     AORTIC ANEURYSM SCREENING (SYSTEM ASSIGNED)  07/31/2016     PNEUMOCOCCAL (2 of 2 - PPSV23) 07/29/2017     ADVANCE DIRECTIVE PLANNING Q5 YRS  05/29/2018     INFLUENZA VACCINE (1) 09/01/2018     FALL RISK ASSESSMENT  09/13/2018     PHQ-2 Q1 YR  09/13/2018     TETANUS IMMUNIZATION (SYSTEM ASSIGNED)  09/23/2020     LIPID SCREEN Q5 YR MALE (SYSTEM  "ASSIGNED)  09/13/2022     COLON CANCER SCREEN (SYSTEM ASSIGNED)  08/16/2026       ROS:  CONSTITUTIONAL: NEGATIVE for fever, chills, change in weight  INTEGUMENTARY/SKIN: NEGATIVE for worrisome rashes, moles or lesions  EYES: NEGATIVE for vision changes or irritation  ENT/MOUTH: NEGATIVE for ear, mouth and throat problems  RESP: NEGATIVE for significant cough or SOB  BREAST: NEGATIVE for masses, tenderness or discharge  CV: NEGATIVE for chest pain, palpitations or peripheral edema  GI: NEGATIVE for nausea, abdominal pain, heartburn, or change in bowel habits  : NEGATIVE for frequency, dysuria, or hematuria  MUSCULOSKELETAL: NEGATIVE for significant arthralgias or myalgia  NEURO: NEGATIVE for weakness, dizziness or paresthesias; he is working full time and his memory is adequate.   ENDOCRINE: NEGATIVE for temperature intolerance, skin/hair changes  HEME: NEGATIVE for bleeding problems  PSYCHIATRIC: NEGATIVE for changes in mood or affect    OBJECTIVE:   /70  Pulse 60  Temp 96.8  F (36  C) (Tympanic)  Ht 5' 9.75\" (1.772 m)  Wt 219 lb (99.3 kg)  SpO2 94%  BMI 31.65 kg/m2 Estimated body mass index is 31.65 kg/(m^2) as calculated from the following:    Height as of this encounter: 5' 9.75\" (1.772 m).    Weight as of this encounter: 219 lb (99.3 kg).  EXAM:   GENERAL APPEARANCE: healthy, alert and no distress  EYES: EOMI,  PERRL  HENT: ear canals and TM's normal and nose and mouth without ulcers or lesions  NECK: no adenopathy, no asymmetry, masses, or scars and thyroid normal to palpation  RESP: lungs clear to auscultation - no rales, rhonchi or wheezes  CV: regular rates and rhythm, normal S1 S2, no S3 or S4 and no murmur, click or rub -  ABDOMEN:  soft, nontender, no HSM or masses and bowel sounds normal  GU_male: testicles normal without atrophy or masses, no hernias and penis normal without urethral discharge  MS: extremities normal- no gross deformities noted, no evidence of inflammation in joints, FROM " "in all extremities.  SKIN: no suspicious lesions or rashes  NEURO: Normal strength and tone, sensory exam grossly normal, mentation intact and speech normal  PSYCH: mentation appears normal and affect normal/bright  LYMPHATICS: No axillary, cervical, inguinal, or supraclavicular nodes      ASSESSMENT / PLAN:   1. Routine history and physical examination of adult  End of Life Planning:  Patient currently has an advanced directive: Yes.  Practitioner is supportive of decision.    COUNSELING:  Reviewed preventive health counseling, as reflected in patient instructions       Regular exercise       Healthy diet/nutrition       Vision screening       Hearing screening    BP Readings from Last 1 Encounters:   09/14/18 114/70     Estimated body mass index is 31.65 kg/(m^2) as calculated from the following:    Height as of this encounter: 5' 9.75\" (1.772 m).    Weight as of this encounter: 219 lb (99.3 kg).     reports that he has never smoked. He has never used smokeless tobacco.  Appropriate preventive services were discussed with this patient, including applicable screening as appropriate for cardiovascular disease, diabetes, osteopenia/osteoporosis, and glaucoma.  As appropriate for age/gender, discussed screening for colorectal cancer, prostate cancer, breast cancer, and cervical cancer. Checklist reviewing preventive services available has been given to the patient.    Reviewed patients plan of care and provided an AVS. The Basic Care Plan (routine screening as documented in Health Maintenance) for David meets the Care Plan requirement. This Care Plan has been established and reviewed with the Patient.    Counseling Resources:  ATP IV Guidelines  Pooled Cohorts Equation Calculator  Breast Cancer Risk Calculator  FRAX Risk Assessment  ICSI Preventive Guidelines  Dietary Guidelines for Americans, 2010  USDA's MyPlate  ASA Prophylaxis  Lung CA Screening    2. Hyperlipidemia LDL goal <130  Do the fasting labs today and we " will call the results.   - Lipid panel reflex to direct LDL Fasting    Steve Patterson MD  [facility]

## 2018-09-14 NOTE — NURSING NOTE
"Initial /70  Pulse 60  Temp 96.8  F (36  C) (Tympanic)  Ht 5' 9.75\" (1.772 m)  Wt 219 lb (99.3 kg)  SpO2 94%  BMI 31.65 kg/m2 Estimated body mass index is 31.65 kg/(m^2) as calculated from the following:    Height as of this encounter: 5' 9.75\" (1.772 m).    Weight as of this encounter: 219 lb (99.3 kg). .      "

## 2018-09-14 NOTE — PATIENT INSTRUCTIONS
Preventive Health Recommendations:       Male Ages 65 and over    Yearly exam:             See your health care provider every year in order to  o   Review health changes.   o   Discuss preventive care.    o   Review your medicines if your doctor has prescribed any.    Talk with your health care provider about whether you should have a test to screen for prostate cancer (PSA).    Every 3 years, have a diabetes test (fasting glucose). If you are at risk for diabetes, you should have this test more often.    Every 5 years, have a cholesterol test. Have this test more often if you are at risk for high cholesterol or heart disease.     Every 10 years, have a colonoscopy. Or, have a yearly FIT test (stool test). These exams will check for colon cancer.    Talk to with your health care provider about screening for Abdominal Aortic Aneurysm if you have a family history of AAA or have a history of smoking.  Shots:     Get a flu shot each year.     Get a tetanus shot every 10 years.     Talk to your doctor about your pneumonia vaccines. There are now two you should receive - Pneumovax (PPSV 23) and Prevnar (PCV 13).    Talk to your pharmacist about a shingles vaccine.     Talk to your doctor about the hepatitis B vaccine.  Nutrition:     Eat at least 5 servings of fruits and vegetables each day.     Eat whole-grain bread, whole-wheat pasta and brown rice instead of white grains and rice.     Get adequate Calcium and Vitamin D.   Lifestyle    Exercise for at least 150 minutes a week (30 minutes a day, 5 days a week). This will help you control your weight and prevent disease.     Limit alcohol to one drink per day.     No smoking.     Wear sunscreen to prevent skin cancer.     See your dentist every six months for an exam and cleaning.     See your eye doctor every 1 to 2 years to screen for conditions such as glaucoma, macular degeneration and cataracts.    ASSESSMENT / PLAN:   1. Routine history and physical examination  "of adult  End of Life Planning:  Patient currently has an advanced directive: Yes.  Practitioner is supportive of decision.    COUNSELING:  Reviewed preventive health counseling, as reflected in patient instructions       Regular exercise       Healthy diet/nutrition       Vision screening       Hearing screening    BP Readings from Last 1 Encounters:   09/14/18 114/70     Estimated body mass index is 31.65 kg/(m^2) as calculated from the following:    Height as of this encounter: 5' 9.75\" (1.772 m).    Weight as of this encounter: 219 lb (99.3 kg).     reports that he has never smoked. He has never used smokeless tobacco.  Appropriate preventive services were discussed with this patient, including applicable screening as appropriate for cardiovascular disease, diabetes, osteopenia/osteoporosis, and glaucoma.  As appropriate for age/gender, discussed screening for colorectal cancer, prostate cancer, breast cancer, and cervical cancer. Checklist reviewing preventive services available has been given to the patient.    Reviewed patients plan of care and provided an AVS. The Basic Care Plan (routine screening as documented in Health Maintenance) for David meets the Care Plan requirement. This Care Plan has been established and reviewed with the Patient.    Counseling Resources:  ATP IV Guidelines  Pooled Cohorts Equation Calculator  Breast Cancer Risk Calculator  FRAX Risk Assessment  ICSI Preventive Guidelines  Dietary Guidelines for Americans, 2010  USDA's MyPlate  ASA Prophylaxis  Lung CA Screening    2. Hyperlipidemia LDL goal <130  Do the fasting labs today and we will call the results.   - Lipid panel reflex to direct LDL Fasting  "

## 2018-09-14 NOTE — MR AVS SNAPSHOT
After Visit Summary   9/14/2018    David Clark    MRN: [id number]           Patient Information     Date Of Birth          1950        Visit Information        Provider Department      9/14/2018 9:00 AM Steve Patterson MD [facility]        Today's Diagnoses     Routine history and physical examination of adult    -  1    Hyperlipidemia LDL goal <130          Care Instructions      Preventive Health Recommendations:       Male Ages 65 and over    Yearly exam:             See your health care provider every year in order to  o   Review health changes.   o   Discuss preventive care.    o   Review your medicines if your doctor has prescribed any.    Talk with your health care provider about whether you should have a test to screen for prostate cancer (PSA).    Every 3 years, have a diabetes test (fasting glucose). If you are at risk for diabetes, you should have this test more often.    Every 5 years, have a cholesterol test. Have this test more often if you are at risk for high cholesterol or heart disease.     Every 10 years, have a colonoscopy. Or, have a yearly FIT test (stool test). These exams will check for colon cancer.    Talk to with your health care provider about screening for Abdominal Aortic Aneurysm if you have a family history of AAA or have a history of smoking.  Shots:     Get a flu shot each year.     Get a tetanus shot every 10 years.     Talk to your doctor about your pneumonia vaccines. There are now two you should receive - Pneumovax (PPSV 23) and Prevnar (PCV 13).    Talk to your pharmacist about a shingles vaccine.     Talk to your doctor about the hepatitis B vaccine.  Nutrition:     Eat at least 5 servings of fruits and vegetables each day.     Eat whole-grain bread, whole-wheat pasta and brown rice instead of white grains and rice.     Get adequate Calcium and Vitamin D.   Lifestyle    Exercise for at least 150 minutes a week (30 minutes a day, 5 days a  "week). This will help you control your weight and prevent disease.     Limit alcohol to one drink per day.     No smoking.     Wear sunscreen to prevent skin cancer.     See your dentist every six months for an exam and cleaning.     See your eye doctor every 1 to 2 years to screen for conditions such as glaucoma, macular degeneration and cataracts.    ASSESSMENT / PLAN:   1. Routine history and physical examination of adult  End of Life Planning:  Patient currently has an advanced directive: Yes.  Practitioner is supportive of decision.    COUNSELING:  Reviewed preventive health counseling, as reflected in patient instructions       Regular exercise       Healthy diet/nutrition       Vision screening       Hearing screening    BP Readings from Last 1 Encounters:   09/14/18 114/70     Estimated body mass index is 31.65 kg/(m^2) as calculated from the following:    Height as of this encounter: 5' 9.75\" (1.772 m).    Weight as of this encounter: 219 lb (99.3 kg).     reports that he has never smoked. He has never used smokeless tobacco.  Appropriate preventive services were discussed with this patient, including applicable screening as appropriate for cardiovascular disease, diabetes, osteopenia/osteoporosis, and glaucoma.  As appropriate for age/gender, discussed screening for colorectal cancer, prostate cancer, breast cancer, and cervical cancer. Checklist reviewing preventive services available has been given to the patient.    Reviewed patients plan of care and provided an AVS. The Basic Care Plan (routine screening as documented in Health Maintenance) for David meets the Care Plan requirement. This Care Plan has been established and reviewed with the Patient.    Counseling Resources:  ATP IV Guidelines  Pooled Cohorts Equation Calculator  Breast Cancer Risk Calculator  FRAX Risk Assessment  ICSI Preventive Guidelines  Dietary Guidelines for Americans, 2010  USDA's MyPlate  ASA Prophylaxis  Lung CA Screening    2. " "Hyperlipidemia LDL goal <130  Do the fasting labs today and we will call the results.   - Lipid panel reflex to direct LDL Fasting          Follow-ups after your visit        Who to contact     If you have questions or need follow up information about today's clinic visit or your schedule please contact [facility] directly at [phone].  Normal or non-critical lab and imaging results will be communicated to you by MyChart, letter or phone within 4 business days after the clinic has received the results. If you do not hear from us within 7 days, please contact the clinic through [employer] or phone. If you have a critical or abnormal lab result, we will notify you by phone as soon as possible.  Submit refill requests through [facility] or call your pharmacy and they will forward the refill request to us. Please allow 3 business days for your refill to be completed.          Additional Information About Your Visit        MyChart Information     [facility] gives you secure access to your electronic health record. If you see a primary care provider, you can also send messages to your care team and make appointments. If you have questions, please call your primary care clinic.  If you do not have a primary care provider, please call [phone] and they will assist you.        Care EveryWhere ID     This is your Care EveryWhere ID. This could be used by other organizations to access your [facility] medical records  [id number]        Your Vitals Were     Pulse Temperature Height Pulse Oximetry BMI (Body Mass Index)       60 96.8  F (36  C) (Tympanic) 5' 9.75\" (1.772 m) 94% 31.65 kg/m2        Blood Pressure from Last 3 Encounters:   09/14/18 114/70   08/29/18 126/72   07/20/18 133/73    Weight from Last 3 Encounters:   09/14/18 219 lb (99.3 kg)   08/29/18 224 lb (101.6 kg)   07/20/18 219 lb (99.3 kg)              We Performed the Following     Lipid panel reflex to direct LDL Fasting        Primary Care Provider " Office Phone # Fax #    Steve Patterson [phone] [address] [facility] [address]        Equal Access to Services     KENDAL BUSCH : Geovanna Chawla, waandersda payalcoltonha, qasoniata kaelham weir, iban jenaein hayaaanh maykrystal josedomenicoaron call. So [facility] [phone].    ATENCIÓN: Si habla español, tiene a delgadillo disposición servicios gratuitos de asistencia lingüística. Llame al [phone].    We comply with applicable federal civil rights laws and [place] laws. We do not discriminate on the basis of race, color, national origin, age, disability, sex, sexual orientation, or gender identity.            Thank you!     Thank you for choosing [facility]  for your care. Our goal is always to provide you with excellent care. Hearing back from our patients is one way we can continue to improve our services. Please take a few minutes to complete the written survey that you may receive in the mail after your visit with us. Thank you!             Your Updated Medication List - Protect others around you: Learn how to safely use, store and throw away your medicines at www.disposemymeds.org.          This list is accurate as of 9/14/18  9:53 AM.  Always use your most recent med list.                   Brand Name Dispense Instructions for use Diagnosis    aspirin 81 MG tablet     30 tablet    Take 81 mg by mouth daily Patient takes 1 tablet every other day        econazole nitrate 1 % cream     60 g    Apply topically 2 times daily    TV (tinea versicolor)       fish oil-omega-3 fatty acids 1000 MG capsule      Take 2-4 capsules by mouth daily.        GLUCOSAMINE 1500 COMPLEX Caps      Take  by mouth.        ketoconazole 2 % shampoo    NIZORAL    240 mL    Apply to the affected area and wash off after 5 minutes daily for first 2 weeks.    TV (tinea versicolor)       Multi-vitamin Tabs tablet   Generic drug:  multivitamin, therapeutic with minerals      Take 1 tablet by mouth  daily.        sildenafil 100 MG tablet    VIAGRA    6 tablet    Take 0.5-1 tablets ( mg) by mouth daily as needed for erectile dysfunction Take 30 min to 4 hours before intercourse.    Erectile dysfunction, unspecified erectile dysfunction type

## 2018-09-14 NOTE — NURSING NOTE
Screening Questionnaire for Adult Immunization    Are you sick today?   No   Do you have allergies to medications, food, a vaccine component or latex?   No   Have you ever had a serious reaction after receiving a vaccination?   No   Do you have a long-term health problem with heart disease, lung disease, asthma, kidney disease, metabolic disease (e.g. diabetes), anemia, or other blood disorder?   No   Do you have cancer, leukemia, HIV/AIDS, or any other immune system problem?   No   In the past 3 months, have you taken medications that affect  your immune system, such as prednisone, other steroids, or anticancer drugs; drugs for the treatment of rheumatoid arthritis, Crohn s disease, or psoriasis; or have you had radiation treatments?   No   Have you had a seizure, or a brain or other nervous system problem?   No   During the past year, have you received a transfusion of blood or blood     products, or been given immune (gamma) globulin or antiviral drug?   No   For women: Are you pregnant or is there a chance you could become        pregnant during the next month?   No   Have you received any vaccinations in the past 4 weeks?   No     Immunization questionnaire answers were all negative.        Per orders of Dr. Patterson, injection of Flu high dose, Pneumovax 23 given by Aura Santana. Patient instructed to remain in clinic for 15 minutes afterwards, and to report any adverse reaction to me immediately.       Screening performed by Aura Santana on 9/14/2018 at 11:37 AM.

## 2018-09-14 NOTE — PROGRESS NOTES

## 2019-01-25 ENCOUNTER — DOCUMENTATION ONLY (OUTPATIENT)
Dept: SLEEP MEDICINE | Facility: CLINIC | Age: 69
End: 2019-01-25

## 2019-01-25 NOTE — PROGRESS NOTES
6 month [facility] Recheck Visit     Diagnostic AHI: 8.9  PSG    Data only recheck     Assessment: Pt meeting objective benchmarks.     Action plan:   Pt to follow up per provider request.      Device type: Auto-CPAP  PAP settings: CPAP min 8.0 cm  H20     CPAP max 15.0 cm  H20    95th% pressure 10 cm  H20   Objective measures: 14 day rolling measures      Compliance  100 %      Leak  46.24 lpm  last  upload      AHI 1.93   last  upload      Average number of minutes 426      Objective measure goal  Compliance   Goal >70%  Leak   Goal < 24 lpm  AHI  Goal < 5  Usage  Goal >240

## 2019-02-11 ENCOUNTER — OFFICE VISIT (OUTPATIENT)
Dept: UROLOGY | Facility: CLINIC | Age: 69
End: 2019-02-11
Payer: MEDICARE

## 2019-02-11 VITALS — SYSTOLIC BLOOD PRESSURE: 120 MMHG | HEART RATE: 68 BPM | DIASTOLIC BLOOD PRESSURE: 86 MMHG | RESPIRATION RATE: 16 BRPM

## 2019-02-11 DIAGNOSIS — R35.0 URINARY FREQUENCY: Primary | ICD-10-CM

## 2019-02-11 LAB
ALBUMIN UR-MCNC: ABNORMAL MG/DL
APPEARANCE UR: CLEAR
BACTERIA #/AREA URNS HPF: ABNORMAL /HPF
BILIRUB UR QL STRIP: NEGATIVE
COLOR UR AUTO: YELLOW
GLUCOSE UR STRIP-MCNC: NEGATIVE MG/DL
HGB UR QL STRIP: NEGATIVE
HYALINE CASTS #/AREA URNS LPF: ABNORMAL /LPF
KETONES UR STRIP-MCNC: NEGATIVE MG/DL
LEUKOCYTE ESTERASE UR QL STRIP: NEGATIVE
MUCOUS THREADS #/AREA URNS LPF: PRESENT /LPF
NITRATE UR QL: NEGATIVE
NON-SQ EPI CELLS #/AREA URNS LPF: ABNORMAL /LPF
PH UR STRIP: 5.5 PH (ref 5–7)
PSA SERPL-MCNC: 0.96 UG/L (ref 0–4)
RBC #/AREA URNS AUTO: ABNORMAL /HPF
SOURCE: ABNORMAL
SP GR UR STRIP: 1.02 (ref 1–1.03)
UROBILINOGEN UR STRIP-ACNC: 1 EU/DL (ref 0.2–1)
WBC #/AREA URNS AUTO: ABNORMAL /HPF

## 2019-02-11 PROCEDURE — 51798 US URINE CAPACITY MEASURE: CPT | Performed by: UROLOGY

## 2019-02-11 PROCEDURE — 36415 COLL VENOUS BLD VENIPUNCTURE: CPT | Performed by: UROLOGY

## 2019-02-11 PROCEDURE — 99204 OFFICE O/P NEW MOD 45 MIN: CPT | Mod: 25 | Performed by: UROLOGY

## 2019-02-11 PROCEDURE — 81001 URINALYSIS AUTO W/SCOPE: CPT | Performed by: UROLOGY

## 2019-02-11 PROCEDURE — 84153 ASSAY OF PSA TOTAL: CPT | Performed by: UROLOGY

## 2019-02-11 RX ORDER — TAMSULOSIN HYDROCHLORIDE 0.4 MG/1
0.4 CAPSULE ORAL AT BEDTIME
Qty: 30 CAPSULE | Refills: 1 | Status: SHIPPED | OUTPATIENT
Start: 2019-02-11 | End: 2019-02-25

## 2019-02-11 NOTE — PROGRESS NOTES
S: David Clark is a pleasant  68 year old male who was requested to be seen by  Steve Patterson for a consult with regard to patient's urinary complaints.  Patient complains of Urgency and Frequency intermittently.  He has no dysuria or hematuria.  He has no obstructive voiding symptoms.  He has no history of elevated PSA.  Symptoms have been on going for a couple years.  Seems to be worsened over time.  His recent PSA was found to be   PSA   Date Value Ref Range Status   07/10/2014 1.01 0 - 4 ug/L Final   .  His AUA Symptom Score:  16.  His QOL score:  6.  He has tried AZO without any reliefs.  Symptoms are worse when he is not busy.    Current Outpatient Medications   Medication Sig Dispense Refill     fish oil-omega-3 fatty acids (FISH OIL) 1000 MG capsule Take 2-4 capsules by mouth daily.       Glucosamine-Chondroit-Vit C-Mn (GLUCOSAMINE 1500 COMPLEX) CAPS Take  by mouth.       Multiple Vitamin (MULTI-VITAMIN) per tablet Take 1 tablet by mouth daily.       tamsulosin (FLOMAX) 0.4 MG capsule Take 1 capsule (0.4 mg) by mouth At Bedtime 30 capsule 1     aspirin 81 MG tablet Take 81 mg by mouth daily Patient takes 1 tablet every other day 30 tablet      econazole nitrate 1 % cream Apply topically 2 times daily 60 g 3     ketoconazole (NIZORAL) 2 % shampoo Apply to the affected area and wash off after 5 minutes daily for first 2 weeks. 240 mL 11     sildenafil (VIAGRA) 100 MG tablet Take 0.5-1 tablets ( mg) by mouth daily as needed for erectile dysfunction Take 30 min to 4 hours before intercourse. 6 tablet 11     Allergies   Allergen Reactions     Lipitor [Atorvastatin Calcium] Cramps     Severe muscle aches   Took 2 years to go a way   Jack Lugo MD       Past Medical History:   Diagnosis Date     Other and unspecified hyperlipidemia      Past Surgical History:   Procedure Laterality Date     COLONOSCOPY N/A 8/16/2016    Procedure: COLONOSCOPY;  Surgeon: Ketan Morgan MD;  Location: [facility]     SURGICAL HISTORY OF -       1 [place] tooth extraction      SURGICAL HISTORY OF -       Flexible Sigmoidoscopy       Family History   Problem Relation Age of Onset     Circulatory Father         bypass surgery at 67, valve replacement     Heart Disease Father 67        bypass-age 67, valve replacement age 72     Dementia Father      Cancer - colorectal Maternal Grandmother      Obesity Brother      Obesity Sister      Gastrointestinal Disease Daughter         esophageal surgery, narrowing of esophagus     Neurologic Disorder Mother      He does not have a family history of prostate cancer.  Social History     Socioeconomic History     Marital status:      Spouse name: None     Number of children: None     Years of education: None     Highest education level: None   Social Needs     Financial resource strain: None     Food insecurity - worry: None     Food insecurity - inability: None     Transportation needs - medical: None     Transportation needs - non-medical: None   Occupational History     Employer: [employer]   Tobacco Use     Smoking status: Never Smoker     Smokeless tobacco: Never Used   Substance and Sexual Activity     Alcohol use: Yes     Comment: occassionally     Drug use: No     Sexual activity: Yes     Partners: Female     Birth control/protection: Surgical     Comment: wife has had tubal   Other Topics Concern     Parent/sibling w/ CABG, MI or angioplasty before 65F 55M? No   Social History Narrative     None        REVIEW OF SYSTEMS  =================  C: NEGATIVE for fever, chills, change in weight  I: NEGATIVE for worrisome rashes, moles or lesions  E/M: NEGATIVE for ear, mouth and throat problems  R: NEGATIVE for significant cough or SHORTNESS OF BREATH  CV:  NEGATIVE for chest pain, palpitations or peripheral edema  GI: NEGATIVE for nausea, abdominal pain, heartburn, or change in bowel habits  NEURO: NEGATIVE numbness/weakness  : see HPI  PSYCH: NEGATIVE  depression/anxiety  LYmph: no new enlarged lymph nodes  Ortho: no new trauma/movements           O: Exam:/86 (BP Location: Right arm, Patient Position: Chair, Cuff Size: Adult Large)   Pulse 68   Resp 16    Constitutional: healthy, alert and no distress  Cardiovascular: negative, PMI normal.   Respiratory: negative, no evidence of respiratory distress  Gastrointestinal: Abdomen soft, non-tender. BS normal. No masses, organomegaly  : penis no discharge. Testis no masses.  No scrotal skin lesion.  Prostate 30 gm smooth  Musculoskeletal: extremities normal- no gross deformities noted, gait normal and normal muscle tone  Skin: no suspicious lesions or rashes  Neurologic: Alert and oriented  Psychiatric: mentation appears normal. and affect normal/bright  Hematologic/Lymphatic/Immunologic: normal ant/post cervical, axillary, supraclavicular and inguinal nodes    Assessment/Plan:   (R35.0) Urinary frequency  (primary encounter diagnosis)  Comment: bladder scan 17 ml.   UA neg  Plan: ? Etiology           Trial of flomax           Side effects discussed           Patient to call in a couple of weeks with reports          If not better, needs cysto.           psa today

## 2019-02-19 DIAGNOSIS — N52.9 ERECTILE DYSFUNCTION, UNSPECIFIED ERECTILE DYSFUNCTION TYPE: ICD-10-CM

## 2019-02-19 NOTE — TELEPHONE ENCOUNTER
Routing refill request to provider for review/approval because:  Drug not active on patient's medication list and there is contraindication between tamsulosin and sildenafil.    Pt last seen in clinic 9/14/18 however last seen for ED 7/29/16.    Nazia Frazier RN

## 2019-02-19 NOTE — TELEPHONE ENCOUNTER
"Requested Prescriptions   Pending Prescriptions Disp Refills     sildenafil (VIAGRA) 100 MG tablet 6 tablet 11     Sig: Take 0.5-1 tablets ( mg) by mouth daily as needed Take 30 min to 4 hours before intercourse.    Erectile Dysfuction Protocol Failed - 2/19/2019  2:26 PM       Failed - Absence of Alpha Blockers on Med list       Passed - Absence of nitrates on medication list       Passed - Recent (12 mo) or future (30 days) visit within the authorizing provider's specialty    Patient had office visit in the last 12 months or has a visit in the next 30 days with authorizing provider or within the authorizing provider's specialty.  See \"Patient Info\" tab in inbasket, or \"Choose Columns\" in Meds & Orders section of the refill encounter.             Passed - Medication is active on med list       Passed - Patient is age 18 or older          "

## 2019-02-19 NOTE — TELEPHONE ENCOUNTER
Reason for Call:  Medication or medication refill: Sildenafil ( Viagra) 100mg    Do you use a [facility]?  Name of the pharmacy and phone number for the current request:  [facility] - [place] [phone]    Name of the medication requested: Sildenafil 100 MG  Last Written Prescription Date:  7/29/2016  Last Fill Quantity: 6,  # refills: 11   Last office visit: 9/14/2018 with prescribing provider:  7/29/2016 Yesenia   Future Office Visit:   Next 5 appointments (look out 90 days)    Feb 25, 2019  9:00 AM CST  [facility] [address] [place] [address] [phone]         Can we leave a detailed message on this number? YES    Phone number patient can be reached at: Home number on file [phone] (home)    Best Time: any    Call taken on 2/19/2019 at 1:36 PM by Mildred Hurley

## 2019-02-20 RX ORDER — SILDENAFIL 100 MG/1
50-100 TABLET, FILM COATED ORAL DAILY PRN
Qty: 6 TABLET | Refills: 11 | Status: SHIPPED | OUTPATIENT
Start: 2019-02-20 | End: 2021-08-12

## 2019-02-22 ENCOUNTER — TELEPHONE (OUTPATIENT)
Dept: UROLOGY | Facility: CLINIC | Age: 69
End: 2019-02-22

## 2019-02-22 NOTE — TELEPHONE ENCOUNTER
Reason for Call:  Other call back    Detailed comments: Patient stating medication for overactive bladder not working. Please advise.     Phone Number Patient can be reached at: Home number on file [phone] (home)    Best Time: any     Can we leave a detailed message on this number? YES    Call taken on 2/22/2019 at 12:03 PM by Karan Keys

## 2019-02-22 NOTE — TELEPHONE ENCOUNTER
Patient reports the flomax was working very well however he has 2-3 episodes of nausea in the mornings and is unsure if they are related to the flomax. Patient is otherwise doing well. He has an apt on Monday. Advised patient that we will further discuss symptoms and alternative treatment plan at that visit if necessary. RN recommended increasing fluid intake and eating bland foods when nauseous. Patient verbalized understanding.    Dann Hunt RN....2/22/2019 1:00 PM

## 2019-02-25 ENCOUNTER — OFFICE VISIT (OUTPATIENT)
Dept: UROLOGY | Facility: CLINIC | Age: 69
End: 2019-02-25
Payer: MEDICARE

## 2019-02-25 VITALS — SYSTOLIC BLOOD PRESSURE: 120 MMHG | HEART RATE: 72 BPM | DIASTOLIC BLOOD PRESSURE: 76 MMHG | RESPIRATION RATE: 16 BRPM

## 2019-02-25 DIAGNOSIS — R35.0 URINARY FREQUENCY: Primary | ICD-10-CM

## 2019-02-25 PROCEDURE — 99213 OFFICE O/P EST LOW 20 MIN: CPT | Mod: 25 | Performed by: UROLOGY

## 2019-02-25 PROCEDURE — 51798 US URINE CAPACITY MEASURE: CPT | Performed by: UROLOGY

## 2019-02-25 RX ORDER — TAMSULOSIN HYDROCHLORIDE 0.4 MG/1
0.4 CAPSULE ORAL AT BEDTIME
Qty: 90 CAPSULE | Refills: 3 | Status: SHIPPED | OUTPATIENT
Start: 2019-02-25 | End: 2019-03-11

## 2019-02-25 NOTE — PROGRESS NOTES
Chief Complaint   Patient presents with     RECHECK     urination issues       David Clark is a 68 year old male who presents today for follow up of   Chief Complaint   Patient presents with     RECHECK     urination issues    f/u for urinary frequency.  He is doing better with flomax however there are days when he has more urgency and frequency.  He denies any hematuria.    Current Outpatient Medications   Medication Sig Dispense Refill     aspirin 81 MG tablet Take 81 mg by mouth daily Patient takes 1 tablet every other day 30 tablet      econazole nitrate 1 % cream Apply topically 2 times daily 60 g 3     fish oil-omega-3 fatty acids (FISH OIL) 1000 MG capsule Take 2-4 capsules by mouth daily.       Glucosamine-Chondroit-Vit C-Mn (GLUCOSAMINE 1500 COMPLEX) CAPS Take  by mouth.       ketoconazole (NIZORAL) 2 % shampoo Apply to the affected area and wash off after 5 minutes daily for first 2 weeks. 240 mL 11     Multiple Vitamin (MULTI-VITAMIN) per tablet Take 1 tablet by mouth daily.       sildenafil (VIAGRA) 100 MG tablet Take 0.5-1 tablets ( mg) by mouth daily as needed Take 30 min to 4 hours before intercourse. 6 tablet 11     tamsulosin (FLOMAX) 0.4 MG capsule Take 1 capsule (0.4 mg) by mouth At Bedtime 90 capsule 3     Allergies   Allergen Reactions     Lipitor [Atorvastatin Calcium] Cramps     Severe muscle aches   Took 2 years to go a way   Jack Lugo MD        Past Medical History:   Diagnosis Date     Other and unspecified hyperlipidemia      Past Surgical History:   Procedure Laterality Date     COLONOSCOPY N/A 8/16/2016    Procedure: COLONOSCOPY;  Surgeon: Ketan Morgan MD;  Location: [facility]     SURGICAL HISTORY OF -       1 [place] tooth extraction      SURGICAL HISTORY OF -       Flexible Sigmoidoscopy      Family History   Problem Relation Age of Onset     Circulatory Father         bypass surgery at 67, valve replacement     Heart Disease Father 67        bypass-age 67, valve  replacement age 72     Dementia Father      Cancer - colorectal Maternal Grandmother      Obesity Brother      Obesity Sister      Gastrointestinal Disease Daughter         esophageal surgery, narrowing of esophagus     Neurologic Disorder Mother      Social History     Socioeconomic History     Marital status:      Spouse name: None     Number of children: None     Years of education: None     Highest education level: None   Occupational History     Employer: [employer]   Social Needs     Financial resource strain: None     Food insecurity:     Worry: None     Inability: None     Transportation needs:     Medical: None     Non-medical: None   Tobacco Use     Smoking status: Never Smoker     Smokeless tobacco: Never Used   Substance and Sexual Activity     Alcohol use: Yes     Comment: occassionally     Drug use: No     Sexual activity: Yes     Partners: Female     Birth control/protection: Surgical     Comment: wife has had tubal   Lifestyle     Physical activity:     Days per week: None     Minutes per session: None     Stress: None   Relationships     Social connections:     Talks on phone: None     Gets together: None     Attends [employer] service: None     Active member of club or organization: None     Attends meetings of clubs or organizations: None     Relationship status: None     Intimate partner violence:     Fear of current or ex partner: None     Emotionally abused: None     Physically abused: None     Forced sexual activity: None   Other Topics Concern     Parent/sibling w/ CABG, MI or angioplasty before 65F 55M? No   Social History Narrative     None       REVIEW OF SYSTEMS  =================  C: NEGATIVE for fever, chills, change in weight  I: NEGATIVE for worrisome rashes, moles or lesions  E/M: NEGATIVE for ear, mouth and throat problems  R: NEGATIVE for significant cough or SHORTNESS OF BREATH,   CV: NEGATIVE for chest pain, palpitations or peripheral edema  GI: NEGATIVE for nausea,  abdominal pain, heartburn, or change in bowel habits  NEURO: NEGATIVE any motor/sensory changes  PSYCH: NEGATIVE for recent mood disorder    Physical Exam:  /76 (BP Location: Right arm, Patient Position: Chair, Cuff Size: Adult Large)   Pulse 72   Resp 16    Patient is pleasant, in no acute distress, good general condition.  Lung: no evidence of respiratory distress    Abdomen: Soft, nondistended, non tender. No masses. No rebound or guarding.   Exam: bladder not distended with RU < 50 ml.  No cva tenderness  Skin: Warm and dry.  No redness.  Psych: normal mood and affect  Neuro: alert and oriented  Musculaskeletal: moving all extremities    Assessment/Plan:   (R35.0) Urinary frequency  (primary encounter diagnosis)  Comment:    Plan: tamsulosin (FLOMAX) 0.4 MG capsule            rtc in one year for recheck

## 2019-02-28 ENCOUNTER — TELEPHONE (OUTPATIENT)
Dept: FAMILY MEDICINE | Facility: CLINIC | Age: 69
End: 2019-02-28

## 2019-02-28 NOTE — TELEPHONE ENCOUNTER
He should stop the Flomax for 10-14 days to see if this is the cause of the symptoms. If he is better then just call our clinic RN and I will order a replacement medication. If this is not the cause then restart the med and recheck with me in clinic to evaluate the symptoms. .Steve Patterson

## 2019-02-28 NOTE — TELEPHONE ENCOUNTER
"I spoke with pt's wife.  She describes that pt is struggling emotionally and she asks if Dr Patterson is willing to see pt sooner than Wednesday?    Lelia explains that she is in [place] at this time and ended up with a detatched retina.  She is recovering but unable to travel for 2 more weeks.   Pt is home in [place] and seems to be falling apart emotionally. Wife states that he is overly worried about getting old and is not attending work or doing other activities of daily living.    Lelia says that this all seemed to start last summer when pt noticed that he was more forgetful than usual.  Then, he developed urinary frequency and the urologist he saw told him that he has an \"old man's bladder.\"      Wife says that pt complains that he feels dizzy and nauseated at times and wonders if it is related to his Flomax?    Nazia Frazier RN        "

## 2019-02-28 NOTE — TELEPHONE ENCOUNTER
Wife called stating that she is in [place] and unable to travel as she has a detact retina. Patient had a recent visit with urology and was told more or less he has an old man's bladder-- him being told his has sent him into a depression, where he wont leave the house, wont travel. Wife wants patient to have appt with Dr Patterson, but nothing available until Wednesday, requesting a sooner appt.     Lamar MOSLEY  Station

## 2019-02-28 NOTE — TELEPHONE ENCOUNTER
I called pt.  He explains that he has been suffering with feeling nauseous and light-headed in the mornings ever since he started taking Flomax.  He mentioned these symptoms to his urologist but didn't feel his questions were adequately answered.    Pt asks to discuss with Dr Patterson.  Pt states that he feels like he can talk to Dr Patterson.    Pt asks if he can be advised what to do or does he need clinic appt?  If he should be seen, he is hoping to be seen before Wednesday due to a flight that he has scheduled.    Routed to Dr Patterson.    Please advise.  If to be seen in clinic, can you work in before Wednesday?    Nazia Frazier RN

## 2019-03-11 ENCOUNTER — OFFICE VISIT (OUTPATIENT)
Dept: FAMILY MEDICINE | Facility: CLINIC | Age: 69
End: 2019-03-11
Payer: MEDICARE

## 2019-03-11 VITALS
TEMPERATURE: 97.8 F | RESPIRATION RATE: 14 BRPM | SYSTOLIC BLOOD PRESSURE: 124 MMHG | OXYGEN SATURATION: 94 % | DIASTOLIC BLOOD PRESSURE: 72 MMHG | HEIGHT: 70 IN | HEART RATE: 69 BPM | BODY MASS INDEX: 30.21 KG/M2 | WEIGHT: 211 LBS

## 2019-03-11 DIAGNOSIS — T78.40XA SENSITIVITY TO MEDICATION, INITIAL ENCOUNTER: Primary | ICD-10-CM

## 2019-03-11 PROCEDURE — 99213 OFFICE O/P EST LOW 20 MIN: CPT | Performed by: FAMILY MEDICINE

## 2019-03-11 RX ORDER — TERAZOSIN 1 MG/1
CAPSULE ORAL
Qty: 30 CAPSULE | Refills: 11 | Status: SHIPPED | OUTPATIENT
Start: 2019-03-11 | End: 2019-05-09 | Stop reason: SINTOL

## 2019-03-11 ASSESSMENT — MIFFLIN-ST. JEOR: SCORE: 1734.37

## 2019-03-11 NOTE — PATIENT INSTRUCTIONS
Thank you for choosing [facility].  You may be receiving a survey in the mail from Soumya Patel regarding your visit today.  Please take a few minutes to complete and return the survey to let us know how we are doing.      If you have questions or concerns, please contact us via [employer] or you can contact your care team at [phone].    Our Clinic hours are:  Monday 6:40 am  to 7:00 pm  Tuesday -Friday 6:40 am to 5:00 pm    The [place] outpatient lab hours are:  Monday - Friday 6:10 am to 4:45 pm  Saturdays 7:00 am to 11:00 am  Appointments are required, call [phone]    If you have clinical questions after hours or would like to schedule an appointment,  call the clinic at [phone].      (T78.40XA) Sensitivity to medication, initial encounter  (primary encounter diagnosis)  Comment: Lipitor and Flomax. may have an enzyme deficiency.   Plan: terazosin (HYTRIN) 1 MG capsule        We discussed the likely side effects of tremor and possibly mood changes of the Flomax. All the symptoms.  We discussed the options for medications and will use Hytrin at 1 mg every other night about 30 minutes before bed.   If needed, the dose could be increased to 1 mg every night 2 weeks later. This will take several weeks for the bladder and prostate to improve,   And take several months for max effect.

## 2019-03-12 ENCOUNTER — TELEPHONE (OUTPATIENT)
Dept: FAMILY MEDICINE | Facility: CLINIC | Age: 69
End: 2019-03-12

## 2019-03-12 NOTE — TELEPHONE ENCOUNTER
Reason for call:  Patient reporting a symptom     Symptom or request: Patient was seen 3/11/19 was seen for change of medication. He took his first dose 3/11/19 last night. Patient is saying he has no energy, very lethargic.      Duration (how long have symptoms been present): 1 day    Have you been treated for this before? Yes, with a different medication       Phone Number patient can be reached at:  Home number on file [phone] (home)    Best Time:  any    Can we leave a detailed message on this number:  YES    Call taken on 3/12/2019 at 11:37 AM by Mildred Hurley

## 2019-03-12 NOTE — TELEPHONE ENCOUNTER
Patient called back and states he is just fine now and does not need a call back.  He laid down and took a nap for 1 and 1/2 hours and feels much better.

## 2019-03-13 NOTE — TELEPHONE ENCOUNTER
He should stop the Terazosin for now. Rather than another new med an appt with Urology is the next step. He should call for the appt at [phone].   Steve Patterson

## 2019-03-13 NOTE — TELEPHONE ENCOUNTER
"Dr. Patterson,    S-(situation): Terazosin Side Effect of lethargy?    B-(background): [facility] 3/11/19 started terazosin     A-(assessment): Patient reports he is having a hard time with this new medication, terazosin. He states he is \"feeling very lethargic and tired\". Patient states he had the same problem yesterday morning but it got better towards the afternoon, now it is worse again. Patient states \"I cannot get anything done because all I want to do is lay in bed, like I am now\". Patient's wife believes \"an ssri medication might be needed...\". Patient states he is not having any other symptoms as he did on the Flomax, but he is just feeling lazy. Patient states \"I almost feel like he is coming down with something; like I am getting sick, but I know I am not\".    Patient denies any difficulty breathing, chest pain, numbness or tingling, inability to think clearly/ altered mental status, or suicidal thoughts.    R-(recommendations): Routing to provider for recommendations? Patient also asks about bladder exercises that he was supposed to get at [facility] and did not receive.    Thanks,  Gisella CAMARILLO RN    "

## 2019-03-25 ENCOUNTER — MYC MEDICAL ADVICE (OUTPATIENT)
Dept: FAMILY MEDICINE | Facility: CLINIC | Age: 69
End: 2019-03-25

## 2019-03-26 NOTE — TELEPHONE ENCOUNTER
Patient is called and he is frustrated with urinating so frequently.  He does have a upcoming appt with Dr. Mooney.  I have offered him an appt with Dr. Patterson for depression and not being able to sleep but then when he tells me its because of his urination frequency I have cancelled it as we have already advised him he needs to f/u with urology. Pearl MUNGUIA RN

## 2019-03-26 NOTE — TELEPHONE ENCOUNTER
Sent message again, and asking him to call us.   Not sure what he is trying to ask.   Maddie Rich RNC

## 2019-03-28 ENCOUNTER — MYC MEDICAL ADVICE (OUTPATIENT)
Dept: FAMILY MEDICINE | Facility: CLINIC | Age: 69
End: 2019-03-28

## 2019-04-01 ENCOUNTER — TELEPHONE (OUTPATIENT)
Dept: FAMILY MEDICINE | Facility: CLINIC | Age: 69
End: 2019-04-01

## 2019-04-01 NOTE — TELEPHONE ENCOUNTER
Dr. Patterson,    Yes I have read your note and the patient and his wife stated that this was during the 3/28/19 encounter where you stated you called the patient.? Sorry I should of stated that in the last note to you. Pearl MUNGUIA RN

## 2019-04-01 NOTE — TELEPHONE ENCOUNTER
See my note from 3-11-19. These were the only instructions given. The Hytrin takes several weeks to work. If he is tolerating 1 mg daily he may increase the dose to 2 mg daily but this will take several weeks for effect. If he is not emptying completely he may need a catheter and needs to go to the Emergency Dept.   Steve Patterson

## 2019-04-01 NOTE — TELEPHONE ENCOUNTER
Reason for call:  Patient reporting a symptom    Symptom or request: frequent urination     Duration (how long have symptoms been present):     Have you been treated for this before? Yes    Additional comments: pt is asking to have what dr sosa has told him last week about this reiterated. His wife is thinking he said one thing and pt thinks another     Phone Number patient can be reached at:  Home number on file [phone] (home)    Best Time:  any    Can we leave a detailed message on this number:  YES    Call taken on 4/1/2019 at 10:53 AM by Jennifer Bolton

## 2019-04-01 NOTE — TELEPHONE ENCOUNTER
Dr. Patterson,      The patient is called and he can not remember what you told him to do if he is urinating frequently.  The patient and his wife can not recall what you said and I do not see any documentation in the chart.  Patient states he has urinated today at 5 a.m., 10:30, 10:45, 11:15, 12:05. When asked if it was the sensation or actual urination he states actual urination.  Please advise. Pearl MUNGUIA RN

## 2019-04-03 ENCOUNTER — OFFICE VISIT (OUTPATIENT)
Dept: FAMILY MEDICINE | Facility: CLINIC | Age: 69
End: 2019-04-03
Payer: MEDICARE

## 2019-04-03 ENCOUNTER — OFFICE VISIT (OUTPATIENT)
Dept: UROLOGY | Facility: CLINIC | Age: 69
End: 2019-04-03
Payer: MEDICARE

## 2019-04-03 VITALS
DIASTOLIC BLOOD PRESSURE: 64 MMHG | RESPIRATION RATE: 16 BRPM | TEMPERATURE: 97.2 F | SYSTOLIC BLOOD PRESSURE: 116 MMHG | HEART RATE: 76 BPM

## 2019-04-03 VITALS
RESPIRATION RATE: 16 BRPM | HEART RATE: 76 BPM | BODY MASS INDEX: 29.63 KG/M2 | OXYGEN SATURATION: 95 % | WEIGHT: 205 LBS | SYSTOLIC BLOOD PRESSURE: 116 MMHG | DIASTOLIC BLOOD PRESSURE: 64 MMHG | TEMPERATURE: 97.2 F

## 2019-04-03 DIAGNOSIS — R45.86 MOOD CHANGES: ICD-10-CM

## 2019-04-03 DIAGNOSIS — R53.83 OTHER FATIGUE: ICD-10-CM

## 2019-04-03 DIAGNOSIS — N39.41 URGE INCONTINENCE OF URINE: Primary | ICD-10-CM

## 2019-04-03 DIAGNOSIS — M25.50 ARTHRALGIA, UNSPECIFIED JOINT: ICD-10-CM

## 2019-04-03 DIAGNOSIS — R35.0 URINARY FREQUENCY: Primary | ICD-10-CM

## 2019-04-03 LAB
ALBUMIN SERPL-MCNC: 4 G/DL (ref 3.4–5)
ALBUMIN UR-MCNC: NEGATIVE MG/DL
ALP SERPL-CCNC: 82 U/L (ref 40–150)
ALT SERPL W P-5'-P-CCNC: 36 U/L (ref 0–70)
ANION GAP SERPL CALCULATED.3IONS-SCNC: 3 MMOL/L (ref 3–14)
APPEARANCE UR: CLEAR
AST SERPL W P-5'-P-CCNC: 20 U/L (ref 0–45)
BASOPHILS # BLD AUTO: 0 10E9/L (ref 0–0.2)
BASOPHILS NFR BLD AUTO: 0.4 %
BILIRUB SERPL-MCNC: 0.5 MG/DL (ref 0.2–1.3)
BILIRUB UR QL STRIP: NEGATIVE
BUN SERPL-MCNC: 16 MG/DL (ref 7–30)
CALCIUM SERPL-MCNC: 9.3 MG/DL (ref 8.5–10.1)
CHLORIDE SERPL-SCNC: 104 MMOL/L (ref 94–109)
CO2 SERPL-SCNC: 32 MMOL/L (ref 20–32)
COLOR UR AUTO: YELLOW
CREAT SERPL-MCNC: 0.91 MG/DL (ref 0.66–1.25)
DIFFERENTIAL METHOD BLD: NORMAL
EOSINOPHIL # BLD AUTO: 0.1 10E9/L (ref 0–0.7)
EOSINOPHIL NFR BLD AUTO: 1.5 %
ERYTHROCYTE [DISTWIDTH] IN BLOOD BY AUTOMATED COUNT: 12.8 % (ref 10–15)
ERYTHROCYTE [SEDIMENTATION RATE] IN BLOOD BY WESTERGREN METHOD: 5 MM/H (ref 0–20)
GFR SERPL CREATININE-BSD FRML MDRD: 87 ML/MIN/{1.73_M2}
GLUCOSE SERPL-MCNC: 94 MG/DL (ref 70–99)
GLUCOSE UR STRIP-MCNC: NEGATIVE MG/DL
HCT VFR BLD AUTO: 49.8 % (ref 40–53)
HGB BLD-MCNC: 17.2 G/DL (ref 13.3–17.7)
HGB UR QL STRIP: NEGATIVE
KETONES UR STRIP-MCNC: NEGATIVE MG/DL
LEUKOCYTE ESTERASE UR QL STRIP: NEGATIVE
LYMPHOCYTES # BLD AUTO: 1.7 10E9/L (ref 0.8–5.3)
LYMPHOCYTES NFR BLD AUTO: 21.6 %
MCH RBC QN AUTO: 31.3 PG (ref 26.5–33)
MCHC RBC AUTO-ENTMCNC: 34.5 G/DL (ref 31.5–36.5)
MCV RBC AUTO: 91 FL (ref 78–100)
MONOCYTES # BLD AUTO: 0.8 10E9/L (ref 0–1.3)
MONOCYTES NFR BLD AUTO: 10.2 %
NEUTROPHILS # BLD AUTO: 5.3 10E9/L (ref 1.6–8.3)
NEUTROPHILS NFR BLD AUTO: 66.3 %
NITRATE UR QL: NEGATIVE
PH UR STRIP: 5.5 PH (ref 5–7)
PLATELET # BLD AUTO: 202 10E9/L (ref 150–450)
POTASSIUM SERPL-SCNC: 4.2 MMOL/L (ref 3.4–5.3)
PROT SERPL-MCNC: 7.4 G/DL (ref 6.8–8.8)
RBC # BLD AUTO: 5.5 10E12/L (ref 4.4–5.9)
RBC #/AREA URNS AUTO: NORMAL /HPF
SODIUM SERPL-SCNC: 139 MMOL/L (ref 133–144)
SOURCE: NORMAL
SP GR UR STRIP: >1.03 (ref 1–1.03)
T4 FREE SERPL-MCNC: 1.05 NG/DL (ref 0.76–1.46)
TSH SERPL DL<=0.005 MIU/L-ACNC: 1.11 MU/L (ref 0.4–4)
UROBILINOGEN UR STRIP-ACNC: 0.2 EU/DL (ref 0.2–1)
WBC # BLD AUTO: 7.9 10E9/L (ref 4–11)
WBC #/AREA URNS AUTO: NORMAL /HPF

## 2019-04-03 PROCEDURE — 85652 RBC SED RATE AUTOMATED: CPT | Performed by: FAMILY MEDICINE

## 2019-04-03 PROCEDURE — 87086 URINE CULTURE/COLONY COUNT: CPT | Performed by: UROLOGY

## 2019-04-03 PROCEDURE — 85025 COMPLETE CBC W/AUTO DIFF WBC: CPT | Performed by: FAMILY MEDICINE

## 2019-04-03 PROCEDURE — 86431 RHEUMATOID FACTOR QUANT: CPT | Performed by: FAMILY MEDICINE

## 2019-04-03 PROCEDURE — 99214 OFFICE O/P EST MOD 30 MIN: CPT | Performed by: FAMILY MEDICINE

## 2019-04-03 PROCEDURE — 51798 US URINE CAPACITY MEASURE: CPT | Performed by: UROLOGY

## 2019-04-03 PROCEDURE — 84439 ASSAY OF FREE THYROXINE: CPT | Performed by: FAMILY MEDICINE

## 2019-04-03 PROCEDURE — 36415 COLL VENOUS BLD VENIPUNCTURE: CPT | Performed by: FAMILY MEDICINE

## 2019-04-03 PROCEDURE — 99214 OFFICE O/P EST MOD 30 MIN: CPT | Mod: 25 | Performed by: UROLOGY

## 2019-04-03 PROCEDURE — 81001 URINALYSIS AUTO W/SCOPE: CPT | Performed by: UROLOGY

## 2019-04-03 PROCEDURE — 86038 ANTINUCLEAR ANTIBODIES: CPT | Performed by: FAMILY MEDICINE

## 2019-04-03 PROCEDURE — 80053 COMPREHEN METABOLIC PANEL: CPT | Performed by: FAMILY MEDICINE

## 2019-04-03 PROCEDURE — 86618 LYME DISEASE ANTIBODY: CPT | Performed by: FAMILY MEDICINE

## 2019-04-03 PROCEDURE — 84443 ASSAY THYROID STIM HORMONE: CPT | Performed by: FAMILY MEDICINE

## 2019-04-03 RX ORDER — OXYBUTYNIN CHLORIDE 5 MG/1
5 TABLET, EXTENDED RELEASE ORAL DAILY
Qty: 60 TABLET | Refills: 3 | Status: SHIPPED | OUTPATIENT
Start: 2019-04-03 | End: 2019-04-25

## 2019-04-03 RX ORDER — CITALOPRAM HYDROBROMIDE 10 MG/1
10 TABLET ORAL DAILY
Qty: 30 TABLET | Refills: 1 | Status: SHIPPED | OUTPATIENT
Start: 2019-04-03 | End: 2019-05-09

## 2019-04-03 NOTE — PATIENT INSTRUCTIONS
(N39.41) Urge incontinence of urine  (primary encounter diagnosis)  Comment:   Plan: he is seeing Dr. Mooney in Urology. Ask about the Hytrin for the prostate.     (R45.86) Mood changes  Comment:   Plan: citalopram (CELEXA) 10 MG tablet        We discussed the mood changes and use the non drug therapies. Avoid stimulants and alcohol.   Start Celexa at 10 mg daily and call our RN if any side effects. This will take several days to start working and several weeks for max effect.   Recheck with me in clinic in 4 weeks if doing well.     (M25.50) Arthralgia, unspecified joint  Comment:   Plan: CBC with platelets differential, Comprehensive         metabolic panel, TSH, T4 FREE, Lyme Disease [place]        with reflex to WB Serum, Erythrocyte         sedimentation rate auto, Anti Nuclear [place] IgG         by IFA with Reflex, Rheumatoid factor        Do these labs today and we will notify of the results. Recommendations pending results.     (R53.83) Other fatigue  Comment:   Plan: CBC with platelets differential, Comprehensive         metabolic panel, TSH, T4 FREE, Lyme Disease [place]        with reflex to WB Serum, Erythrocyte         sedimentation rate auto, Anti Nuclear [place] IgG         by IFA with Reflex, Rheumatoid factor        See above.

## 2019-04-03 NOTE — PATIENT INSTRUCTIONS
Per physician instructions.    If you have questions or concerns on any instructions given to you by your provider today or if you need to schedule an appointment, you can reach us at [phone].    Thank you!

## 2019-04-03 NOTE — PROGRESS NOTES
SUBJECTIVE:   Davdi Clark is a 68 year old male who presents to clinic today for the following health issues:    Chief Complaint   Patient presents with     Blood Draw     would like to have a lymes test     Urinary Frequency     has some follow up questions regarding his frequent urination, has stopped the hytrin     There is a new tremor for the last three months. His weight has gone down 10 lbs.   He has mood changes.     Current Outpatient Medications:      econazole nitrate 1 % cream, Apply topically 2 times daily, Disp: 60 g, Rfl: 3     fish oil-omega-3 fatty acids (FISH OIL) 1000 MG capsule, Take 2-4 capsules by mouth daily., Disp: , Rfl:      Glucosamine-Chondroit-Vit C-Mn (GLUCOSAMINE 1500 COMPLEX) CAPS, Take  by mouth., Disp: , Rfl:      ketoconazole (NIZORAL) 2 % shampoo, Apply to the affected area and wash off after 5 minutes daily for first 2 weeks., Disp: 240 mL, Rfl: 11     Multiple Vitamin (MULTI-VITAMIN) per tablet, Take 1 tablet by mouth daily., Disp: , Rfl:      sildenafil (VIAGRA) 100 MG tablet, Take 0.5-1 tablets ( mg) by mouth daily as needed Take 30 min to 4 hours before intercourse. (Patient not taking: Reported on 4/3/2019), Disp: 6 tablet, Rfl: 11     terazosin (HYTRIN) 1 MG capsule, Take one pill every other night for 2 weeks, and then one daily at bedtime, if needed. . (Patient not taking: Reported on 4/3/2019), Disp: 30 capsule, Rfl: 11    Patient Active Problem List   Diagnosis     Tinea versicolor     Thyroid nodule     Advanced directives, counseling/discussion     Erectile dysfunction, unspecified erectile dysfunction type     Prediabetes     Class 1 obesity due to excess calories with body mass index (BMI) of 31.0 to 31.9 in adult, unspecified whether serious comorbidity present     EDY (obstructive sleep apnea)     Hyperlipidemia LDL goal <130     Sensitivity to medication, initial encounter     Blood pressure 116/64, pulse 76, temperature 97.2  F (36.2  C), temperature  source Tympanic, resp. rate 16, weight 93 kg (205 lb), SpO2 95 %.    Exam:  GENERAL APPEARANCE: alert and fatigued  EYES: EOMI,  PERRL  NECK: no adenopathy and thyromegaly approximately 2 times normal on the right  CV: regular rates and rhythm, normal S1 S2, no S3 or S4 and no murmur, click or rub -  SKIN: no suspicious lesions or rashes  PSYCH: affect flat and fatigued    (N39.41) Urge incontinence of urine  (primary encounter diagnosis)  Comment:   Plan: he is seeing Dr. Mooney in Urology. Ask about the Hytrin for the prostate.     (R45.86) Mood changes  Comment:   Plan: citalopram (CELEXA) 10 MG tablet        We discussed the mood changes and use the non drug therapies. Avoid stimulants and alcohol.   Start Celexa at 10 mg daily and call our RN if any side effects. This will take several days to start working and several weeks for max effect.   Recheck with me in clinic in 4 weeks if doing well.     (M25.50) Arthralgia, unspecified joint  Comment:   Plan: CBC with platelets differential, Comprehensive         metabolic panel, TSH, T4 FREE, Lyme Disease [place]        with reflex to WB Serum, Erythrocyte         sedimentation rate auto, Anti Nuclear [place] IgG         by IFA with Reflex, Rheumatoid factor        Do these labs today and we will notify of the results. Recommendations pending results.     (R53.83) Other fatigue  Comment:   Plan: CBC with platelets differential, Comprehensive         metabolic panel, TSH, T4 FREE, Lyme Disease [place]        with reflex to WB Serum, Erythrocyte         sedimentation rate auto, Anti Nuclear [place] IgG         by IFA with Reflex, Rheumatoid factor        See above.     Steve Patterson

## 2019-04-03 NOTE — NURSING NOTE
Active order to obtain bladder scan? Yes   Name of ordering provider:  Vinicio   Bladder scan preformed post void Yes.  Bladder scan reveled 30ML  Provider notified?  Yes    Leslie CRENSHAW CMA

## 2019-04-03 NOTE — NURSING NOTE
"Chief Complaint   Patient presents with     Urinary Frequency       Initial /64   Pulse 76   Temp 97.2  F (36.2  C) (Tympanic)   Resp 16  Estimated body mass index is 29.63 kg/m  as calculated from the following:    Height as of 3/11/19: 1.772 m (5' 9.75\").    Weight as of an earlier encounter on 4/3/19: 93 kg (205 lb).  BP completed using cuff size: regular  Medications and allergies reviewed.      Leslie CRENSHAW CMA     "

## 2019-04-04 LAB
ANA SER QL IF: NEGATIVE
B BURGDOR IGG+IGM SER QL: 0.03 (ref 0–0.89)
BACTERIA SPEC CULT: NO GROWTH
Lab: NORMAL
RHEUMATOID FACT SER NEPH-ACNC: <20 IU/ML (ref 0–20)
SPECIMEN SOURCE: NORMAL

## 2019-04-04 NOTE — PROGRESS NOTES
Appointment source: New Patient  Patient name: David Clark  Urology Staff: Jayson Mooney MD    Seen at the request of Dr. Patterson    Subjective: This is a 68 year old year old male complaining of urinary frequency and urge incontinence.    Previously evaluated by Urology (Dr. Garcia) with no apparent underlying urologic pathology discovered other than a presumption of bladder outlet obstruction.    Tamsulosin was prescribed but did not improve the urgency.    Denies a history of enuresis.    Now wearing pull ups and reluctant to leave the house for fear of urinary incontinence.    Objective:  Increasing issue for at least the last 6 months. Has also been having some form of neurological disturbance including acute episodes of confusion.    Post void residual 30 mL.    Exam not repeated at the request of the patient.    Assessment:  Urinary urgency in the context of increasing alteration of cognition.    Plan:  Will approach urgency/frequency of urination directly with an anticholinergic medication in an effort to relieve the need for pull ups. Will follow closely.    Total time 30 minutes, counseling 20 minutes discussing urinary urgency.

## 2019-04-08 ENCOUNTER — MYC MEDICAL ADVICE (OUTPATIENT)
Dept: FAMILY MEDICINE | Facility: CLINIC | Age: 69
End: 2019-04-08

## 2019-04-08 DIAGNOSIS — R35.0 URINARY FREQUENCY: ICD-10-CM

## 2019-04-08 DIAGNOSIS — M54.50 LUMBAR PAIN: Primary | ICD-10-CM

## 2019-04-08 NOTE — TELEPHONE ENCOUNTER
Please call David and give the phone for the MRI: [phone], to schedule. We will call the results.   He can have them faxed to his chiropractor, that requested the test.   Steve Patterson

## 2019-04-16 ENCOUNTER — HOSPITAL ENCOUNTER (OUTPATIENT)
Dept: MRI IMAGING | Facility: CLINIC | Age: 69
Discharge: HOME | End: 2019-04-16
Attending: FAMILY MEDICINE | Admitting: FAMILY MEDICINE
Payer: MEDICARE

## 2019-04-16 DIAGNOSIS — R35.0 URINARY FREQUENCY: ICD-10-CM

## 2019-04-16 DIAGNOSIS — M54.50 LUMBAR PAIN: ICD-10-CM

## 2019-04-16 PROCEDURE — 72148 MRI LUMBAR SPINE W/O DYE: CPT

## 2019-04-18 ENCOUNTER — MYC MEDICAL ADVICE (OUTPATIENT)
Dept: FAMILY MEDICINE | Facility: CLINIC | Age: 69
End: 2019-04-18

## 2019-04-24 ENCOUNTER — NURSE TRIAGE (OUTPATIENT)
Dept: NURSING | Facility: CLINIC | Age: 69
End: 2019-04-24

## 2019-04-24 ENCOUNTER — TELEPHONE (OUTPATIENT)
Dept: UROLOGY | Facility: CLINIC | Age: 69
End: 2019-04-24

## 2019-04-24 DIAGNOSIS — R35.0 URINARY FREQUENCY: ICD-10-CM

## 2019-04-24 NOTE — TELEPHONE ENCOUNTER
David is calling about a medication he takes- oxybutynin.     oxybutynin ER (DITROPAN-XL) 5 MG 24 hr tablet 60 tablet 3 4/3/2019  --   Sig - Route: Take 1 tablet (5 mg) by mouth daily - Oral     David states that he takes one 5 mg tablet in the morning and two 5 mg tablets in the evening.     David says his doctor said it's okay to take 3 pills a day. Need an updated order to reflect that this is okay for David to do. David is running low on pills, only five pills left, and he will need a refill soon.    David can be reached at [phone].    Message sent to the [facility] Urology clinic, RN Pool    Jody Olivier RN/[facility]      Reason for Disposition    Caller has NON-URGENT medication question about med that PCP prescribed and triager unable to answer question    Protocols used: MEDICATION QUESTION CALL-ADULT-

## 2019-04-24 NOTE — TELEPHONE ENCOUNTER
Clinic Action Needed: yes, please call patient at [phone].  [facility] Triage Call  Presenting Problem:  David is calling about a medication he takes- oxybutynin.     oxybutynin ER (DITROPAN-XL) 5 MG 24 hr tablet 60 tablet 3 4/3/2019  --   Sig - Route: Take 1 tablet (5 mg) by mouth daily - Oral     David states that he takes one 5 mg tablet in the morning and two 5 mg tablets in the evening.     David says his doctor said it's okay to take 3 pills a day instead of one pill a day. Need an updated order to reflect that this is okay for David to do. David says he is running low on pills (5 pills left) and will need a refill soon.    Routed to: [facility]rology, RN Pool

## 2019-04-25 ENCOUNTER — TELEPHONE (OUTPATIENT)
Dept: UROLOGY | Facility: CLINIC | Age: 69
End: 2019-04-25

## 2019-04-25 DIAGNOSIS — R35.0 URINARY FREQUENCY: ICD-10-CM

## 2019-04-25 RX ORDER — OXYBUTYNIN CHLORIDE 5 MG/1
15 TABLET, EXTENDED RELEASE ORAL DAILY
Qty: 90 TABLET | Refills: 1 | Status: SHIPPED | OUTPATIENT
Start: 2019-04-25 | End: 2019-05-23

## 2019-04-25 NOTE — TELEPHONE ENCOUNTER
Prior Authorization Retail Medication Request    Medication/Dose: Oxybutynin  ICD code (if different than what is on RX):    Previously Tried and Failed:    Rationale:      Insurance Name:  Medicare  Insurance ID:  [id number]       Pharmacy Information (if different than what is on RX)  Name:  [facility]  Phone:  [phone]

## 2019-04-25 NOTE — TELEPHONE ENCOUNTER
Called pt- Per Dr Mooney's last MyChart- recommended 3 tabs daily.  Made appt and reviewed SE with high dose med.  Lakeisha Corcoran RN  [facility]

## 2019-04-30 NOTE — TELEPHONE ENCOUNTER
Prior Authorization Approval    Authorization Effective Date: 4/30/2019  Authorization Expiration Date: 12/31/2019  Medication: Oxybutynin XL 5mg Tab - approved  Approved Dose/Quantity: 90 for 30 days   Insurance Company: [employer] ([facility]) - Phone [phone] Fax [phone]  Which Pharmacy is filling the prescription (Not needed for infusion/clinic administered): [facility] [address] [place] [address] AT [facility] OF [address] [place] [address]  Pharmacy Notified: Yes  Patient Notified: Yes

## 2019-04-30 NOTE — TELEPHONE ENCOUNTER
PA Initiation    Medication: Oxybutynin XL 5mg Tab - INITIATED  Insurance Company: [employer] ([facility]) - Phone [phone] Fax [phone]  Pharmacy Filling the Rx: [facility] [address] [place] [address] [address] AT [address]  Filling Pharmacy Phone: [phone]  Filling Pharmacy Fax:    Start Date: 4/30/2019

## 2019-05-06 DIAGNOSIS — R35.0 URINARY FREQUENCY: Primary | ICD-10-CM

## 2019-05-08 ENCOUNTER — OFFICE VISIT (OUTPATIENT)
Dept: UROLOGY | Facility: CLINIC | Age: 69
End: 2019-05-08
Payer: MEDICARE

## 2019-05-08 VITALS
HEART RATE: 65 BPM | BODY MASS INDEX: 29.63 KG/M2 | DIASTOLIC BLOOD PRESSURE: 67 MMHG | TEMPERATURE: 96.5 F | WEIGHT: 205 LBS | SYSTOLIC BLOOD PRESSURE: 111 MMHG

## 2019-05-08 DIAGNOSIS — R39.15 URINARY URGENCY: Primary | ICD-10-CM

## 2019-05-08 DIAGNOSIS — R35.0 URINARY FREQUENCY: ICD-10-CM

## 2019-05-08 PROCEDURE — 99214 OFFICE O/P EST MOD 30 MIN: CPT | Mod: 25 | Performed by: UROLOGY

## 2019-05-08 PROCEDURE — 52000 CYSTOURETHROSCOPY: CPT | Performed by: UROLOGY

## 2019-05-08 PROCEDURE — 87086 URINE CULTURE/COLONY COUNT: CPT | Performed by: UROLOGY

## 2019-05-08 PROCEDURE — 51798 US URINE CAPACITY MEASURE: CPT | Performed by: UROLOGY

## 2019-05-08 NOTE — PROGRESS NOTES
Appointment source: Established Patient  Patient name: David Clark  Urology Staff: Jayson Mooney MD    Subjective: This is a 68 year old year old male returning for follow up of urinary urgency.    Continues to have urgency of urination although nocturia seems to have improved to some degree after starting melatonin sleep aid. Daytime urgency remains.    Degree of confusion seems to be worsening. He cannot manage the scheduling of his daytime medications.    Objective:  Post void residual 175 mL    Cystoscopy was performed and the only finding was a small area of slightly inflamed bladder mucosa on the posterior bladder wall. Not a significant enough worry to merit cystoscopy under anesthesia for a biopsy.    Assessment:  Worrisome ongoing and increasing confusion and persistent urinary urgency.    Concerned that the urgency of urination may be more centrally mediated in view of associated changes in mentation.    Plan:  Will continue using anticholinergic medication for control of the urinary urgency and also spoke with his neurologist about my concerns with altered mental state.    Return for follow up in 1-2 months. May want to recheck the bladder cystoscopically.    Total time 25 minutes, counseling 15 minutes discussing urgency of urination.

## 2019-05-08 NOTE — PATIENT INSTRUCTIONS
Per Physician's instructions.      If you have questions or concerns on any instructions given to you by your provider today or if you need to schedule an appointment, you can reach us at [phone].     Thank you!          Nam CAMARILLO CMA

## 2019-05-08 NOTE — NURSING NOTE
Pt brought back to the procedure room for a cystoscopy per Dr. Mooney Informed consent obtained, pt prepped in a sterile manner and a uro jet was used.      Scope serial number: [id number] Olympus      Leslie M, CMA

## 2019-05-08 NOTE — NURSING NOTE
"Initial /67 (BP Location: Right arm, Patient Position: Sitting, Cuff Size: Adult Regular)   Pulse 65   Temp 96.5  F (35.8  C) (Tympanic)   Wt 93 kg (205 lb)   BMI 29.63 kg/m   Estimated body mass index is 29.63 kg/m  as calculated from the following:    Height as of 3/11/19: 1.772 m (5' 9.75\").    Weight as of this encounter: 93 kg (205 lb). .    Nam CAMARILLO CMA    "

## 2019-05-08 NOTE — NURSING NOTE
Active order to obtain bladder scan? Yes   Name of ordering provider: Dr. Mooney  Bladder scan preformed post void Yes.  Bladder scan revealed 175ml  Provider notified?  Yes    Nam CAMARILLO CMA

## 2019-05-09 ENCOUNTER — OFFICE VISIT (OUTPATIENT)
Dept: FAMILY MEDICINE | Facility: CLINIC | Age: 69
End: 2019-05-09
Payer: MEDICARE

## 2019-05-09 VITALS
DIASTOLIC BLOOD PRESSURE: 58 MMHG | BODY MASS INDEX: 29.03 KG/M2 | TEMPERATURE: 97.9 F | WEIGHT: 202.8 LBS | RESPIRATION RATE: 18 BRPM | HEART RATE: 70 BPM | HEIGHT: 70 IN | OXYGEN SATURATION: 93 % | SYSTOLIC BLOOD PRESSURE: 100 MMHG

## 2019-05-09 DIAGNOSIS — M47.816 SPONDYLOSIS OF LUMBAR REGION WITHOUT MYELOPATHY OR RADICULOPATHY: Primary | ICD-10-CM

## 2019-05-09 DIAGNOSIS — R45.86 MOOD CHANGES: ICD-10-CM

## 2019-05-09 LAB
BACTERIA SPEC CULT: NO GROWTH
Lab: NORMAL
SPECIMEN SOURCE: NORMAL

## 2019-05-09 PROCEDURE — 99214 OFFICE O/P EST MOD 30 MIN: CPT | Performed by: FAMILY MEDICINE

## 2019-05-09 RX ORDER — OXYBUTYNIN CHLORIDE 5 MG/1
5 TABLET ORAL 3 TIMES DAILY
Qty: 60 TABLET | Refills: 3 | Status: SHIPPED | OUTPATIENT
Start: 2019-05-09 | End: 2019-05-23

## 2019-05-09 RX ORDER — CITALOPRAM HYDROBROMIDE 20 MG/1
20 TABLET ORAL DAILY
Qty: 30 TABLET | Refills: 1 | Status: SHIPPED | OUTPATIENT
Start: 2019-05-09 | End: 2019-07-15

## 2019-05-09 ASSESSMENT — MIFFLIN-ST. JEOR: SCORE: 1697.17

## 2019-05-09 ASSESSMENT — ANXIETY QUESTIONNAIRES
6. BECOMING EASILY ANNOYED OR IRRITABLE: NOT AT ALL
1. FEELING NERVOUS, ANXIOUS, OR ON EDGE: MORE THAN HALF THE DAYS
7. FEELING AFRAID AS IF SOMETHING AWFUL MIGHT HAPPEN: NEARLY EVERY DAY
GAD7 TOTAL SCORE: 10
3. WORRYING TOO MUCH ABOUT DIFFERENT THINGS: MORE THAN HALF THE DAYS
5. BEING SO RESTLESS THAT IT IS HARD TO SIT STILL: NOT AT ALL
2. NOT BEING ABLE TO STOP OR CONTROL WORRYING: MORE THAN HALF THE DAYS
IF YOU CHECKED OFF ANY PROBLEMS ON THIS QUESTIONNAIRE, HOW DIFFICULT HAVE THESE PROBLEMS MADE IT FOR YOU TO DO YOUR WORK, TAKE CARE OF THINGS AT HOME, OR GET ALONG WITH OTHER PEOPLE: VERY DIFFICULT

## 2019-05-09 ASSESSMENT — PATIENT HEALTH QUESTIONNAIRE - PHQ9
SUM OF ALL RESPONSES TO PHQ QUESTIONS 1-9: 12
5. POOR APPETITE OR OVEREATING: SEVERAL DAYS

## 2019-05-09 NOTE — PATIENT INSTRUCTIONS
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 Modify activities and avoid repetitive bending and twisting and lifting. Use the NSAID such as advil at 400-600 mg per dose, with non spicy food, three times for 5-7 days. Continue the therapy if beneficial. If not better with this plan then call our clinic RN at [phone] and Physical therapy with modalities and lumbar traction would be ordered. If this is not effective then consideration for a cortisone shot could be done. Take frequent rest periods.     (R44.60) Mood changes  Comment:   Plan: citalopram (CELEXA) 20 MG tablet        For the Celexa, we discussed the dose and you are on 10 mg daily. We discussed the options and will increase to 20 mg daily. This will take several days to start working and 3-4 weeks for max effect.   Recheck with me in one month if doing well. Use the non drug therapies.

## 2019-05-09 NOTE — NURSING NOTE
"Initial /58   Pulse 70   Temp 97.9  F (36.6  C) (Tympanic)   Ht 1.772 m (5' 9.75\")   Wt 92 kg (202 lb 12.8 oz)   SpO2 93%   BMI 29.31 kg/m   Estimated body mass index is 29.31 kg/m  as calculated from the following:    Height as of this encounter: 1.772 m (5' 9.75\").    Weight as of this encounter: 92 kg (202 lb 12.8 oz). .      "

## 2019-05-09 NOTE — PROGRESS NOTES
SUBJECTIVE:   David Clark is a 68 year old male who presents to clinic today for the following   health issues:      Chief Complaint   Patient presents with     Results     Patient is here to discuss recent MRI     The lumbar MRI from 4-16-19 showed:   IMPRESSION: Multilevel degenerative disc and facet disease most  advanced at L3-4 where there is moderate to severe central canal  stenosis and moderate bilateral neural foraminal stenosis.         Current Outpatient Medications:      citalopram (CELEXA) 10 MG tablet, Take 1 tablet (10 mg) by mouth daily, Disp: 30 tablet, Rfl: 1     econazole nitrate 1 % cream, Apply topically 2 times daily, Disp: 60 g, Rfl: 3     fish oil-omega-3 fatty acids (FISH OIL) 1000 MG capsule, Take 2-4 capsules by mouth daily., Disp: , Rfl:      Glucosamine-Chondroit-Vit C-Mn (GLUCOSAMINE 1500 COMPLEX) CAPS, Take  by mouth., Disp: , Rfl:      oxybutynin (DITROPAN) 5 MG tablet, Take 1 tablet (5 mg) by mouth 3 times daily (immediate release formula), Disp: 60 tablet, Rfl: 3     oxybutynin ER (DITROPAN-XL) 5 MG 24 hr tablet, Take 3 tablets (15 mg) by mouth daily, Disp: 90 tablet, Rfl: 1     ketoconazole (NIZORAL) 2 % shampoo, Apply to the affected area and wash off after 5 minutes daily for first 2 weeks. (Patient not taking: Reported on 5/9/2019), Disp: 240 mL, Rfl: 11     Multiple Vitamin (MULTI-VITAMIN) per tablet, Take 1 tablet by mouth daily., Disp: , Rfl:      sildenafil (VIAGRA) 100 MG tablet, Take 0.5-1 tablets ( mg) by mouth daily as needed Take 30 min to 4 hours before intercourse. (Patient not taking: Reported on 5/8/2019), Disp: 6 tablet, Rfl: 11    Patient Active Problem List   Diagnosis     Tinea versicolor     Thyroid nodule     Advanced directives, counseling/discussion     Erectile dysfunction, unspecified erectile dysfunction type     Prediabetes     Class 1 obesity due to excess calories with body mass index (BMI) of 31.0 to 31.9 in adult, unspecified whether serious  "comorbidity present     EDY (obstructive sleep apnea)     Hyperlipidemia LDL goal <130     Sensitivity to medication, initial encounter       Blood pressure 100/58, pulse 70, temperature 97.9  F (36.6  C), temperature source Tympanic, resp. rate 18, height 1.772 m (5' 9.75\"), weight 92 kg (202 lb 12.8 oz), SpO2 93 %.    Exam:  GENERAL APPEARANCE: healthy, alert and no distress  MS: decreased range of motion of the lower back.   PSYCH: mentation appears normal and affect normal/bright    (M47.816) Spondylosis of lumbar region without myelopathy or radiculopathy  (primary encounter diagnosis)  Comment:   Plan: For the lower back we discussed the results of the MRI and there is disc disease or wearing out and arthritis. Modify activities and avoid repetitive bending and twisting and lifting. Use the NSAID such as advil at 400-600 mg per dose, with non spicy food, three times for 5-7 days. Continue the therapy if beneficial. If not better with this plan then call our clinic RN at [phone] and Physical therapy with modalities and lumbar traction would be ordered. If this is not effective then consideration for a cortisone shot could be done. Take frequent rest periods.     (R48.16) Mood changes  Comment:   Plan: citalopram (CELEXA) 20 MG tablet        For the Celexa, we discussed the dose and you are on 10 mg daily. We discussed the options and will increase to 20 mg daily. This will take several days to start working and 3-4 weeks for max effect.   Recheck with me in one month if doing well. Use the non drug therapies.       Steve Patterson"

## 2019-05-10 ASSESSMENT — ANXIETY QUESTIONNAIRES: GAD7 TOTAL SCORE: 10

## 2019-05-15 NOTE — PROGRESS NOTES
ESTABLISHED PATIENT NEUROLOGY NOTE    DATE OF VISIT: 5/16/2019  CLINIC LOCATION: [facility]  MRN: [id number]  PATIENT NAME: David Clark  YOB: 1950    PCP: Steve Patterson MD    REASON FOR VISIT:   Chief Complaint   Patient presents with     Follow Up     memory     SUBJECTIVE:                                                      HISTORY OF PRESENT ILLNESS: Patient previously seen for single episode of confusion/disorientation here for follow up regarding forgetfulness. Please refer to my initial note from 07/20/2018 for further information.  The patient is accompanied by his wife, who participates in interview and serves as a collateral source of information.    Since the last visit, the patient reports that his memory is stable.  No new episodes of confusion.  Reports bilateral hand tremor, left worse than right, that started approximately 6 months ago and most noticeable in certain positions and with movements.  Absent at rest.  The patient thinks that it started after initiation of oxybutynin that he takes for urinary urgency (extensive urologic evaluation was unrevealing).  Denies interval development of other new neurological symptoms.    According to patient's wife, memory continues to worsen compared to the initial visit.  The patient needs frequent reminders about upcoming appointments.  Wife is in charge of finances.  She also helps with his pills, because he missed several doses.  She also reports that the patient has severe anxiety that until recently was interfering with his daily activity because he was afraid to go out.  Now it seems to be better after he started Celexa, prescribed by his primary care provider.    Sleep deprived longer than 1 hour EEG was abnormal due to mild encephalopathy of non-specific etiology without other abnormal findings, including interictal epileptiform discharges or electrographic seizures.  His TSH/free T4 were normal in April  2019.    On review of systems, patient endorses no other active complaints. Medications, allergies, family and social history were also reviewed. There are no changes reported by patient.  REVIEW OF SYSTEMS:                                                    10-system review was completed. Pertinent positives are included in HPI. The remainder of ROS is negative.  EXAM:                                                    Physical Exam:   Vitals: /62 (BP Location: Right arm, Patient Position: Sitting, Cuff Size: Adult Regular)   Pulse 72   Temp 97.4  F (36.3  C) (Oral)   Resp 16   Wt 90.1 kg (198 lb 9.6 oz)   SpO2 94%   BMI 28.70 kg/m    [place] Cognitive Assessment:    David Cognitive Assessment (MOCA)  Visuospatial/Executive : 3  Naming: 3  Attention - Digits: 2  Attention - Letters: 0  Attention - Subtraction: 3  Language - Repeat: 1  Language - Fluency : 0  Abstraction: 2  Delayed Recall: 2  Orientation: 6  Education: 0  MOCA Score: 22  Administered by: : Janiya Ferris MA     [place] Cognitive Assessment Score:  MOCA Score: 22/30.     General: pt is in NAD, cooperative.  Skin: normal turgor, moist mucous membranes, no lesions/rashes noticed.  HEENT: ATNC, white sclera, normal conjunctiva.  Respiratory: Symmetric lung excursion, no accessory respiratory muscle use.  Abdomen: Non distended.  Neurological: awake, cooperative, follows commands, no aphasia or dysarthria noted, MoCA as per above, cranial nerves II-XII: no ptosis, extraocular motility is full, face is symmetric, tongue is midline, equally moves all extremities, deep tendon reflexes are symmetric bilaterally, strength is 5/5 bilaterally, mild intermittent left greater than right positional and action hand tremor, muscle tone is normal at rest, mildly increases with provoking maneuvers versus paratonia, no dysmetria bilaterally, casual gait is normal with preserved arm swings and stride length.  Pull test is negative.  DATA:   Labs/EEG: I  personally reviewed pertinent labs/EEG tracing, as detailed in the history of present illness.  ASSESSMENT AND PLAN:                                                    Assessment: 68-year-old male patient with single episode of confusion/disorientation presents to discuss worsening forgetfulness/cognitive problems.    We had a prolonged discussion with the patient and his wife regarding his current complaints.  His MoCA today is 22/30, which is consistent with mild cognitive impairment versus mild dementia.  The rest of neurological exam is non-focal, except positional hand tremor that could represent essential versus enhanced physiologic tremor.  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  Recent TSH/free T4 were normal range.    His presentation might be due to non-restorative sleep, vitamin deficiencies, mental health disorders, and neurodegenerative conditions with Alzheimer's disease being most common.  For further diagnostic clarification I ordered neuropsychologic evaluation and screening labs for reversible causes of cognitive dysfunction.  I also ordered CPT.    Diagnoses:    ICD-10-CM    1. Memory loss R41.3 NEUROPSYCHOLOGY REFERRAL     Methylmalonic acid     Vitamin B1 whole blood     Vitamin B12     OCCUPATIONAL THERAPY REFERRAL     Plan: At today's visit we thoroughly discussed various diagnostic possibilities for patient's symptoms, necessary evaluation, and the plan, which includes:  Orders Placed This Encounter   Procedures     Methylmalonic acid     Vitamin B1 whole blood     Vitamin B12     NEUROPSYCHOLOGY REFERRAL     OCCUPATIONAL THERAPY REFERRAL     No new medications.    I advised the patient to stay physically and mentally active with particular emphasis on daily mentally stimulating activities of  his choice (such as crosswords, puzzles,  esau, etc.), stretching exercises, walking, and healthy eating.     Additional recommendations after the work-up.    Next follow-up appointment is in the next 2-3 months or earlier if needed.    Total Time: 45 minutes with > 50% spent counseling the patient and his wife on stated above assessment and recommendations.    Albaro Victoria MD  HP/ Neurology

## 2019-05-16 ENCOUNTER — OFFICE VISIT (OUTPATIENT)
Dept: NEUROLOGY | Facility: CLINIC | Age: 69
End: 2019-05-16
Payer: MEDICARE

## 2019-05-16 VITALS
SYSTOLIC BLOOD PRESSURE: 106 MMHG | DIASTOLIC BLOOD PRESSURE: 62 MMHG | BODY MASS INDEX: 28.7 KG/M2 | RESPIRATION RATE: 16 BRPM | OXYGEN SATURATION: 94 % | WEIGHT: 198.6 LBS | HEART RATE: 72 BPM | TEMPERATURE: 97.4 F

## 2019-05-16 DIAGNOSIS — R41.3 MEMORY LOSS: Primary | ICD-10-CM

## 2019-05-16 LAB — VIT B12 SERPL-MCNC: 412 PG/ML (ref 193–986)

## 2019-05-16 PROCEDURE — 99215 OFFICE O/P EST HI 40 MIN: CPT | Performed by: PSYCHIATRY & NEUROLOGY

## 2019-05-16 PROCEDURE — 99000 SPECIMEN HANDLING OFFICE-LAB: CPT | Performed by: PSYCHIATRY & NEUROLOGY

## 2019-05-16 PROCEDURE — 83921 ORGANIC ACID SINGLE QUANT: CPT | Mod: 90 | Performed by: PSYCHIATRY & NEUROLOGY

## 2019-05-16 PROCEDURE — 36415 COLL VENOUS BLD VENIPUNCTURE: CPT | Performed by: PSYCHIATRY & NEUROLOGY

## 2019-05-16 PROCEDURE — 82607 VITAMIN B-12: CPT | Performed by: PSYCHIATRY & NEUROLOGY

## 2019-05-16 PROCEDURE — 84425 ASSAY OF VITAMIN B-1: CPT | Mod: 90 | Performed by: PSYCHIATRY & NEUROLOGY

## 2019-05-16 ASSESSMENT — MONTREAL COGNITIVE ASSESSMENT (MOCA)
WHAT LEVEL OF EDUCATION WAS ATTAINED: 0
7. [VIGILENCE] TAP WHEN HEARING DESIGNATED LETTER: 0
12. MEMORY INDEX SCORE: 2
VISUOSPATIAL/EXECUTIVE SUBSCORE: 3
4. NAME EACH OF THE THREE ANIMALS SHOWN: 3
13. ORIENTATION SUBSCORE: 6
11. FOR EACH PAIR OF WORDS, WHAT CATEGORY DO THEY BELONG TO (OUT OF 2): 2
10. [FLUENCY] NAME WORDS STARTING WITH DESIGNATED LETTER: 0
WHAT IS THE TOTAL SCORE (OUT OF 30): 22
9. REPEAT EACH SENTENCE: 1
6. READ LIST OF DIGITS [FORWARD/BACKWARD]: 2
8. SERIAL SUBTRACTION OF 7S: 3

## 2019-05-16 NOTE — LETTER
5/16/2019         RE: David Clark  [address] [facility] [address]        Dear Colleague,    Thank you for referring your patient, David Clark, to the [facility]. Please see a copy of my visit note below.    ESTABLISHED PATIENT NEUROLOGY NOTE    DATE OF VISIT: 5/16/2019  CLINIC LOCATION: [facility]  MRN: [id number]  PATIENT NAME: David Clark  YOB: 1950    PCP: Steve Patterson MD    REASON FOR VISIT:   Chief Complaint   Patient presents with     Follow Up     memory     SUBJECTIVE:                                                      HISTORY OF PRESENT ILLNESS: Patient previously seen for single episode of confusion/disorientation here for follow up regarding forgetfulness. Please refer to my initial note from 07/20/2018 for further information.  The patient is accompanied by his wife, who participates in interview and serves as a collateral source of information.    Since the last visit, the patient reports that his memory is stable.  No new episodes of confusion.  Reports bilateral hand tremor, left worse than right, that started approximately 6 months ago and most noticeable in certain positions and with movements.  Absent at rest.  The patient thinks that it started after initiation of oxybutynin that he takes for urinary urgency (extensive urologic evaluation was unrevealing).  Denies interval development of other new neurological symptoms.    According to patient's wife, memory continues to worsen compared to the initial visit.  The patient needs frequent reminders about upcoming appointments.  Wife is in charge of finances.  She also helps with his pills, because he missed several doses.  She also reports that the patient has severe anxiety that until recently was interfering with his daily activity because he was afraid to go out.  Now it seems to be better after he started Celexa, prescribed by his primary care  provider.    Sleep deprived longer than 1 hour EEG was abnormal due to mild encephalopathy of non-specific etiology without other abnormal findings, including interictal epileptiform discharges or electrographic seizures.  His TSH/free T4 were normal in April 2019.    On review of systems, patient endorses no other active complaints. Medications, allergies, family and social history were also reviewed. There are no changes reported by patient.  REVIEW OF SYSTEMS:                                                    10-system review was completed. Pertinent positives are included in HPI. The remainder of ROS is negative.  EXAM:                                                    Physical Exam:   Vitals: /62 (BP Location: Right arm, Patient Position: Sitting, Cuff Size: Adult Regular)   Pulse 72   Temp 97.4  F (36.3  C) (Oral)   Resp 16   Wt 90.1 kg (198 lb 9.6 oz)   SpO2 94%   BMI 28.70 kg/m     David Cognitive Assessment:    David Cognitive Assessment (MOCA)  Visuospatial/Executive : 3  Naming: 3  Attention - Digits: 2  Attention - Letters: 0  Attention - Subtraction: 3  Language - Repeat: 1  Language - Fluency : 0  Abstraction: 2  Delayed Recall: 2  Orientation: 6  Education: 0  MOCA Score: 22  Administered by: : Janiya Ferris MA     [place] Cognitive Assessment Score:  MOCA Score: 22/30.     General: pt is in NAD, cooperative.  Skin: normal turgor, moist mucous membranes, no lesions/rashes noticed.  HEENT: ATNC, white sclera, normal conjunctiva.  Respiratory: Symmetric lung excursion, no accessory respiratory muscle use.  Abdomen: Non distended.  Neurological: awake, cooperative, follows commands, no aphasia or dysarthria noted, MoCA as per above, cranial nerves II-XII: no ptosis, extraocular motility is full, face is symmetric, tongue is midline, equally moves all extremities, deep tendon reflexes are symmetric bilaterally, strength is 5/5 bilaterally, mild intermittent left greater than right  positional and action hand tremor, muscle tone is normal at rest, mildly increases with provoking maneuvers versus paratonia, no dysmetria bilaterally, casual gait is normal with preserved arm swings and stride length.  Pull test is negative.  DATA:   Labs/EEG: I personally reviewed pertinent labs/EEG tracing, as detailed in the history of present illness.  ASSESSMENT AND PLAN:                                                    Assessment: 68-year-old male patient with single episode of confusion/disorientation presents to discuss worsening forgetfulness/cognitive problems.    We had a prolonged discussion with the patient and his wife regarding his current complaints.  His MoCA today is 22/30, which is consistent with mild cognitive impairment versus mild dementia.  The rest of neurological exam is non-focal, except positional hand tremor that could represent essential versus enhanced physiologic tremor.  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  Recent TSH/free T4 were normal range.    His presentation might be due to non-restorative sleep, vitamin deficiencies, mental health disorders, and neurodegenerative conditions with Alzheimer's disease being most common.  For further diagnostic clarification I ordered neuropsychologic evaluation and screening labs for reversible causes of cognitive dysfunction.  I also ordered CPT.    Diagnoses:    ICD-10-CM    1. Memory loss R41.3 NEUROPSYCHOLOGY REFERRAL     Methylmalonic acid     Vitamin B1 whole blood     Vitamin B12     OCCUPATIONAL THERAPY REFERRAL     Plan: At today's visit we thoroughly discussed various diagnostic possibilities for patient's symptoms, necessary evaluation, and the plan, which includes:  Orders Placed This Encounter   Procedures     Methylmalonic acid     Vitamin B1 whole blood     Vitamin B12      NEUROPSYCHOLOGY REFERRAL     OCCUPATIONAL THERAPY REFERRAL     No new medications.    I advised the patient to stay physically and mentally active with particular emphasis on daily mentally stimulating activities of   his choice (such as crosswords, puzzles, sudoku, etc.), stretching exercises, walking, and healthy eating.     Additional recommendations after the work-up.    Next follow-up appointment is in the next 2-3 months or earlier if needed.    Total Time: 45 minutes with > 50% spent counseling the patient and his wife on stated above assessment and recommendations.    Albaro Victoria MD  / Neurology      Again, thank you for allowing me to participate in the care of your patient.        Sincerely,        Albaro Victoria MD

## 2019-05-16 NOTE — PATIENT INSTRUCTIONS
AFTER VISIT SUMMARY (AVS):    At today's visit we thoroughly discussed various diagnostic possibilities for your symptoms, necessary evaluation, and the plan, which includes:  Orders Placed This Encounter   Procedures     Methylmalonic acid     Vitamin B1 whole blood     Vitamin B12     NEUROPSYCHOLOGY REFERRAL     OCCUPATIONAL THERAPY REFERRAL     No new medications.    Stay physically and mentally active with particular emphasis on daily mentally stimulating activities of your choice (such as crosswords, puzzles, sudoku, etc.), stretching exercises, walking, and healthy eating.     Additional recommendations after the work-up.    Next follow-up appointment is in the next 2-3 months or earlier if needed.    Please do not hesitate to call me with any questions or concerns.    Thanks.

## 2019-05-18 LAB — METHYLMALONATE SERPL-SCNC: 0.16 UMOL/L (ref 0–0.4)

## 2019-05-19 LAB — VIT B1 BLD-MCNC: 138 NMOL/L (ref 70–180)

## 2019-05-22 ENCOUNTER — MYC MEDICAL ADVICE (OUTPATIENT)
Dept: FAMILY MEDICINE | Facility: CLINIC | Age: 69
End: 2019-05-22

## 2019-05-22 DIAGNOSIS — M47.816 SPONDYLOSIS OF LUMBAR REGION WITHOUT MYELOPATHY OR RADICULOPATHY: Primary | ICD-10-CM

## 2019-05-23 ENCOUNTER — MYC MEDICAL ADVICE (OUTPATIENT)
Dept: UROLOGY | Facility: CLINIC | Age: 69
End: 2019-05-23

## 2019-05-23 ENCOUNTER — TELEPHONE (OUTPATIENT)
Dept: UROLOGY | Facility: CLINIC | Age: 69
End: 2019-05-23

## 2019-05-23 DIAGNOSIS — R35.0 URINARY FREQUENCY: ICD-10-CM

## 2019-05-23 DIAGNOSIS — R39.15 URINARY URGENCY: ICD-10-CM

## 2019-05-23 RX ORDER — OXYBUTYNIN CHLORIDE 5 MG/1
15 TABLET, EXTENDED RELEASE ORAL DAILY
Qty: 90 TABLET | Refills: 3 | Status: SHIPPED | OUTPATIENT
Start: 2019-05-23 | End: 2019-07-25

## 2019-05-23 RX ORDER — OXYBUTYNIN CHLORIDE 5 MG/1
5 TABLET ORAL 3 TIMES DAILY
Qty: 270 TABLET | Refills: 3 | Status: SHIPPED | OUTPATIENT
Start: 2019-05-23 | End: 2019-07-25

## 2019-05-23 RX ORDER — OXYBUTYNIN CHLORIDE 5 MG/1
5 TABLET ORAL 3 TIMES DAILY
Qty: 270 TABLET | Refills: 0 | Status: SHIPPED | OUTPATIENT
Start: 2019-05-23 | End: 2019-05-23

## 2019-05-31 ENCOUNTER — HOSPITAL ENCOUNTER (OUTPATIENT)
Dept: PHYSICAL THERAPY | Facility: CLINIC | Age: 69
Setting detail: THERAPIES SERIES
End: 2019-05-31
Attending: FAMILY MEDICINE
Payer: MEDICARE

## 2019-05-31 DIAGNOSIS — M47.816 SPONDYLOSIS OF LUMBAR REGION WITHOUT MYELOPATHY OR RADICULOPATHY: ICD-10-CM

## 2019-05-31 PROCEDURE — 97110 THERAPEUTIC EXERCISES: CPT | Mod: GP | Performed by: PHYSICAL THERAPIST

## 2019-05-31 PROCEDURE — 97161 PT EVAL LOW COMPLEX 20 MIN: CPT | Mod: GP | Performed by: PHYSICAL THERAPIST

## 2019-05-31 NOTE — PROGRESS NOTES
David Clark   PHYSICAL THERAPY EVALUATION    05/31/19 1300   General Information   Type of Visit Initial OP Ortho PT Evaluation   Start of Care Date 05/31/19   Referring Physician DR. Patterson   Patient/Family Goals Statement be able to exercise, walk   Orders Evaluate and Treat   Date of Order 05/09/19   Certification Required? Yes   Medical Diagnosis LBP   Body Part(s)   Body Part(s) Lumbar Spine/SI   Presentation and Etiology   Pertinent history of current problem (include personal factors and/or comorbidities that impact the POC) LBP 6-8/wks, has been going to chiro. Now at 1x/wk, Now coming to PT, Sx increase sitting too long, walking is fine.Pain 7/10 at worst.( Has some memory issues) Has not been doing any exercise since this started    Onset date of current episode/exacerbation 03/29/19   Prior Level of Function   Functional Level Prior Comment lat pull, bicep curl, knee exten, twisting with cables, chest press, walk/jog on treadmill, push up , sit up   Current Level of Function   Patient role/employment history F. Retired   Fall Risk Screen   Fall screen completed by PT   Have you fallen 2 or more times in the past year? No   Have you fallen and had an injury in the past year? No   Is patient a fall risk? No   Lumbar Spine/SI Objective Findings   Posture decreased lumbar lordosis, kyphotic in lower thoracic, flat middle T spine   Flexion %   Extension % no sx   Right Side Bending ROM WNL   Left Side Bending ROM WNL   Pelvic Screen level   Hip Screen hip ROM WNL   Hip Abduction Strength 5   Hip Adduction Strength 5   Hip Extension Strength 5   Knee Flexion Strength 5   Knee Extension (L3) Strength 5   Ankle Dorsiflexion (L4) Strength 5   Hamstring Flexibility 60* B   Hip Flexor Flexibility WFL   Quadricep Flexibility mild tight B   SLR neg   Palpation no tenderness   Planned Therapy Interventions   Planned Therapy Interventions strengthening;stretching;neuromuscular re-education   Planned  Therapy Interventions Comment core strength   Clinical Impression   Criteria for Skilled Therapeutic Interventions Met yes, treatment indicated   PT Diagnosis LBP   Functional limitations due to impairments walking, lifting, exercise program   Clinical Presentation Stable/Uncomplicated   Clinical Decision Making (Complexity) Low complexity   Therapy Frequency 1 time/week   Predicted Duration of Therapy Intervention (days/wks) 4wks   Risk & Benefits of therapy have been explained Yes   Patient, Family & other staff in agreement with plan of care Yes   Clinical Impression Comments would benefit from skilled pT to reintorduce HEP and return to regular activities fo rpt   Education Assessment   Preferred Learning Style Listening;Pictures/video   Barriers to Learning Other  (forgetfulness - per wife, wife present)   ORTHO GOALS   PT Ortho Eval Goals 1;2   Ortho Goal 1   Goal Description pt will be able to do all regular acitivites without LBP in 4wk   Target Date 06/30/19   Ortho Goal 2   Goal Description indep in home program for self magenement of sx in 4wk   Target Date 06/30/19   Total Evaluation Time   PT Sebastian, Low Complexity Minutes (49069) 15   Kris Hoenk, PT #[id number]  [facility]

## 2019-05-31 NOTE — PROGRESS NOTES
[facility]          OUTPATIENT PHYSICAL THERAPY ORTHOPEDIC EVALUATION  PLAN OF TREATMENT FOR OUTPATIENT REHABILITATION  (COMPLETE FOR INITIAL CLAIMS ONLY)  Patient's Last Name, First Name, M.I.  YOB: 1950  David Clark    Provider s Name:  [facility]   Medical Record No.  [id number]   Start of Care Date:  05/31/19   Onset Date:  03/29/19   Type:     _X__PT   ___OT   ___SLP Medical Diagnosis:   LBP, spondylosis     PT Diagnosis:  LBP   Visits from SOC:  1      _________________________________________________________________________________  Plan of Treatment/Functional Goals:  strengthening, stretching, neuromuscular re-education  core strength        Goals     Goal Description: pt will be able to do all regular acitivites without LBP in 4wk  Target Date: 06/30/19       Goal Description: indep in home program for self magenement of sx in 4wk  Target Date: 06/30/19     Therapy Frequency:  1 time/week  Predicted Duration of Therapy Intervention:  4wks    Kris Hoenk, PT                 I CERTIFY THE NEED FOR THESE SERVICES FURNISHED UNDER        THIS PLAN OF TREATMENT AND WHILE UNDER MY CARE     (Physician co-signature of this document indicates review and certification of the therapy plan).                       Certification Date From: 5/31/19     Certification Date To:   6/30/19    Referring Provider:  DR. Patterson    Initial Assessment        See Epic Evaluation Start of Care Date: 05/31/19

## 2019-06-07 ENCOUNTER — HOSPITAL ENCOUNTER (OUTPATIENT)
Dept: OCCUPATIONAL THERAPY | Facility: CLINIC | Age: 69
Setting detail: THERAPIES SERIES
End: 2019-06-07
Attending: PSYCHIATRY & NEUROLOGY
Payer: MEDICARE

## 2019-06-07 DIAGNOSIS — R41.3 MEMORY LOSS: ICD-10-CM

## 2019-06-07 PROCEDURE — 96125 COGNITIVE TEST BY HC PRO: CPT | Mod: GO,59 | Performed by: OCCUPATIONAL THERAPIST

## 2019-06-07 PROCEDURE — 97165 OT EVAL LOW COMPLEX 30 MIN: CPT | Mod: GO | Performed by: OCCUPATIONAL THERAPIST

## 2019-06-07 NOTE — PROGRESS NOTES
06/07/19 1300   Quick Adds   Quick Adds Certification   Type of Visit Initial Outpatient Occupational Therapy Evaluation       Present No   General Information   Start Of Care Date 06/07/19   Referring Physician Dr. Albaro Victoria   Orders Evaluate and treat as indicated   Other Orders CPT   Orders Date 05/16/19   Medical Diagnosis Memory Loss   Onset of Illness/Injury or Date of Surgery 05/16/19  (referral date)   Surgical/Medical History Reviewed Yes   Additional Occupational Profile Info/Pertinent History of Current Problem Pt is a 68 year old male referred to OT for the assessment using the CPT (Cognitive Performance Test).   David was seen approximately one year ago by neurology for concerns with confusion/disorientation.  He returned to neurology again due to ongoing memory concerns and anxiety.  A sleep deprived longer than 1 hour EEG was abnormal due to mild encephalopathy of non specific etiology without other abnormal findings.  He had completed [facility] MOCA scoring 22/30.  He also exhibits bilateral hand tremors, left worse than right.  He currently takes Oxybutynin for urinary urgency.  He will be having a neuropsych evaluation as well in the near future.   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  He reports that it is taking him longer to do his current job, feels fatigued more than usual.   Patient/family Goals Statement To be assessed to aide in helping problem solve his difficulties   Pain   Patient currently in pain No   Fall Risk Screen   Fall screen comments  "recently assessed by PT   Abuse Screen (yes response referral indicated)   Feels Unsafe at Home or Work/School no   Feels Threatened by Someone no   Does Anyone Try to Keep You From Having Contact with Others or Doing Things Outside Your Home? no   Physical Signs of Abuse Present no   Cognitive Status Examination   Orientation Orientation to person, place and time   Level of Consciousness Alert   Follows Commands and Answers Questions 100% of the time;Able to follow single-step instructions;Unable to follow multi-step instructions   Memory Impaired   Attention Selective attention impaired, difficulty ignoring irrelevant stimuli;Alternating attention impaired, difficulty shifting between tasks;Divided attention impaired, difficulty with simultaneous tasks;Distractible during evaluation;Sustained attention impaired   Organization/Problem Solving Problem solving impaired;Prioritizing of information/tasks impaired   Executive Function Working memory impaired, decreased storage of information for performing tasks;Cognitive flexibility impaired;Planning ability impaired   Cognitive Comment CPT:  4.8, see write up   Visual Perception   Visual Perception Wears glasses   Oculomotor WNL   Hand Strength   Hand Dominance Right   Muscle Tone   Muscle Tone No deficits were identified   Bathing   Level of [place] - Bathing independent   Bathing Comments He does not, that there are times where \"I can get lost in the shower\"   Upper Body Dressing   Level of [place]: Dress Upper Body independent   Lower Body Dressing   Level of [place]: Dress Lower Body independent   Toileting   Level of [place]: Toilet independent   Grooming   Level of [place]: Grooming independent   Eating/Self-Feeding   Level of [place]: Eating independent   Activity Tolerance   Activity Tolerance Low endurance, takes naps during the day   Instrumental Activities of Daily Living Assessment   IADL Assessment/Observations Drives " minimally at this time   Clinical Impression   Criteria for Skilled Therapeutic Interventions Met Evaluation only   OT Diagnosis Mild Cognitive Impairment   Influenced by the following impairments anxiety,    Assessment of Occupational Performance 1-3 Performance Deficits   Identified Performance Deficits home management, social skills, work skills   Clinical Decision Making (Complexity) Low complexity   Therapy Frequency 1x visit   Risks and Benefits of Treatment have been explained. Yes   Patient, Family & other staff in agreement with plan of care Yes   Clinical Impression Comments 68 year old male showing mild cognitive impairment with CPT testing   Education Assessment   Barriers To Learning Cognitive   Total Evaluation Time   OT Eval, Low Complexity Minutes ([id number]) 10

## 2019-06-07 NOTE — PROGRESS NOTES
[facility]          OUTPATIENT OCCUPATIONAL THERAPY  EVALUATION  PLAN OF TREATMENT FOR OUTPATIENT REHABILITATION  (COMPLETE FOR INITIAL CLAIMS ONLY)  Patient's Last Name, First Name, M.I.  YOB: 1950  David Clark                        Provider's Name  [facility] Medical Record No.  [id number]                               Onset Date:     05/16/19(referral date)   Start of Care Date:     06/07/19   Type:     ___PT   _X_OT   ___SLP Medical Diagnosis:     Memory Loss                          OT Diagnosis:     Mild Cognitive Impairment Visits from SOC:  1   _________________________________________________________________________________  Plan of Treatment/Functional Goals:  Completed CPT:  4.8/5.6, see additional write up.       Therapy Frequency: 1x visit     Diogo Cisneros OT          I CERTIFY THE NEED FOR THESE SERVICES FURNISHED UNDER        THIS PLAN OF TREATMENT AND WHILE UNDER MY CARE     (Physician co-signature of this document indicates review and certification of the therapy plan).              Referring Physician: Dr. Albaro Victoria     Initial Assessment        See Epic Evaluation      Start Of Care Date: 06/07/19

## 2019-06-07 NOTE — PROGRESS NOTES
Cognitive Performance Test    SUMMARY OF TEST:    The Cognitive Performance Test (CPT) is a standardized performance-based assessment to measure working memory/executive function processing capacities that underlie functional performance. Subtasks include common basic and instrumental activities of daily living (ADL/IADL) which are rated based on the manner in which patients respond to task demands of varying complexity. The total CPT score describes a level of functioning that indicates how information is processed, implications for functional activities, potential safety risks and a recommended level of supervision or assist based on cognitive function. The highest total score on this test is in the range of 5.6 to 5.8.    DATE OF TESTIN2019    RESULTS OF TESTING:                                                                                         CPT Subtest Results    MEDBOX: 4.0/6 SHOP/GLOVES: 5.0/6 PHONE: 5.0/6   WASH:  5.0/5 TRAVEL: 5.0/6 TOAST: x/5   DRESS: x/5   TOTAL CPT SCORE:       Average CPT Score  4.8/5.6    INTERPRETATION OF TEST RESULTS:    Based on the Cognitive Performance Test, this patient scored at CPT Level 4.8.  See CPT Levels reference below.    Summary of functional cognitive status:   Overall this score suggests a low mild to high moderate cognitive functional decline with executive control functions such as task planning, problem solving, divided attention and new learning.  He demonstrates difficulties managing details.  Tasks such as finance management, changes in medication regimens, complicated work tasks, and driving pose a risk.  At this time he will be successful with tasks that are routine to him, needing repetitive hands on learning for a new task.       Factors affecting performance:  No additional problems noted    Recommendations:    Supervision for ADL/IADL:  Finances, Driving and Medication management     Education provided to:  Utilization of calendar and  have him participate in orientation to this, have him repeat information told to him, physical exercise, minimize distractions, routine based days (as able), minimize stressors, organization of items he uses daily.                                                    TIME ADMINISTERING TEST: 45      CPT Levels Reference:    Patient's Average CPT Score:  4.8                                                                                                                                                  Individual scores range along a continuum as outlined below.  In addition to cognitive status, other factors may affect safety in a home environment.  Please refer to specific recommendations for this patient.    ___5.6-5.8  Normal functioning (absence of cognitive-functional disability).  Independent in managing personal affairs, monitors and directs own behavior.  Uses complex information to carry out daily activities with safety and accuracy.    Proficient with instrumental activities of daily living (IADL) and learning new activity.  Problems are anticipated, errors are avoided, and consequences of actions are considered.    x  ___5.0   Mild cognitive-functional disability; deficits in working memory and executive thought processes. Difficulty using complex information. Problems may be observed with recent memory, judgment, reasoning and planning ahead. May be impulsive or have difficulty anticipating consequences.  Safety:  May require assistance to plan ahead; or to manage complex medication schedules, appointments or finances.  Hazardous activities may need to be monitored or limited.  ADL:  Mild functional decline.  Able to complete basic self-care and routine household tasks.  May have difficulty with complex daily tasks such as reading, writing, meal preparation, shopping or driving.   Learns through hands on teaching. Self-centered behavior or difficulty considering the needs of others may be seen related to  "trouble seeing the  whole picture\". Can appear disorganized or uninhibited.    __x_4.5  Mild to moderate cognitive-functional disability. Significant deficits in working memory and executive thought processes. Judgment, reasoning and planning show obvious impairment.  Distractible with inability to shift attention/actions given competing stimuli.  Difficulty with problem solving and managing details. Complex daily tasks performed with inconsistency, difficulty, or error.     Safety:  Medications should be monitored, stove use may require supervision, and driving ability may be affected.  Impaired safety awareness with inability to anticipate potential problems.  May not recognize or respond to emergent situations. Requires frequent check-in support.   ADL:  Mild difficulty with simple everyday self-care tasks. Benefits from structured, routine activity.  Will likely need reminders to complete tasks outside of the routine. Requires assistance with planning and IADL tasks like shopping and finances. Learns concrete tasks through repetition, but performance may not generalize. Tends to be impulsive with poor insight. Self centered behavior or inability to consider the needs of others is common.    ___4.0  Moderate cognitive-functional disability; abstract to concrete thought processes. Working memory and executive function impairments are obvious. Difficulty with planning and problem solving.  Behavior is goal-directed, but unable to follow multi-step directions, is easily distracted, and may not recognize mistakes.  Inability to anticipate hazards or understand precautions.  Safety:  Recommend 24-hour supervision for safety. Supervision needed for medication management and for hazardous activities. May not be able to follow a restricted diet. Can get lost in unfamiliar surroundings. Generally, persons functioning at level 4 should not be driving.   ADL:  Some decline in quality or frequency of ADL.  Herkimer " enhanced by use of a routine, simple concrete directions, and caregiver set-up of needed items. Complex tasks such as money or home management typically requires assistance.  Relies heavily on vision to guide behavior; will ignore objects/hazards not in plain sight and can be distracted by irrelevant objects. Often has poor insight.  Able to carry out social conversation and may verbally  cover  for deficits leading caregivers to believe they are capable of functioning independently.       ___3.5  Moderate cognitive-functional disability; increased cues needed for task completion. Aware of concrete task steps but needs prompting or cues to initiate and complete simple tasks. Attention span is limited, simple directions may need to be repeated, and re-focus to a topic or task may be required.  Safety:  24-hour supervision required for safety and for assistance with daily tasks. Assistance required with medications, and access to medication should be limited. Meals, nutrition and dietary restrictions need to be monitored.  All hazardous activities should be restricted or supervised. Should not drive. Prone to wandering and can become lost.  ADL:  Moderate functional decline. Familiar tasks usually requires set-up of supplies and directions to complete steps. May need objects handed to them for task initiation. Function best with a set schedule in familiar surroundings with familiar people. All complex tasks must be done by others. Vocabulary is diminished and speech often unfocused.

## 2019-06-12 ENCOUNTER — HOSPITAL ENCOUNTER (OUTPATIENT)
Dept: PHYSICAL THERAPY | Facility: CLINIC | Age: 69
Setting detail: THERAPIES SERIES
End: 2019-06-12
Attending: PSYCHIATRY & NEUROLOGY
Payer: MEDICARE

## 2019-06-12 PROCEDURE — 97110 THERAPEUTIC EXERCISES: CPT | Mod: GP | Performed by: PHYSICAL THERAPIST

## 2019-07-01 DIAGNOSIS — R39.15 URINARY URGENCY: Primary | ICD-10-CM

## 2019-07-01 RX ORDER — MIRABEGRON 50 MG/1
50 TABLET, EXTENDED RELEASE ORAL DAILY
Qty: 90 TABLET | Refills: 3 | Status: SHIPPED | OUTPATIENT
Start: 2019-07-01 | End: 2020-06-20

## 2019-07-03 ENCOUNTER — OFFICE VISIT (OUTPATIENT)
Dept: NEUROPSYCHOLOGY | Facility: CLINIC | Age: 69
End: 2019-07-03
Attending: PSYCHIATRY & NEUROLOGY
Payer: MEDICARE

## 2019-07-03 DIAGNOSIS — G31.84 MCI (MILD COGNITIVE IMPAIRMENT): Primary | ICD-10-CM

## 2019-07-03 NOTE — PROGRESS NOTES
The patient was seen for neuropsychological evaluation at the request of Albaro Victoria MD for the purposes of diagnostic clarification and treatment planning.  170 minutes of test administration and scoring were provided by this writer.  Please see Dr. Rodrigo Wilder's report for a full interpretation of the findings.    Teresa Harrington  Psychometrist

## 2019-07-10 NOTE — PROGRESS NOTES
David Clark   PHYSICAL THERAPY DISCHARGE  06/12/19 1000   Signing Clinician's Name / Credentials   Signing clinician's name / credentials Kris Hoenk, PT   Session Number   Session Number [id number]  [facility]   Ortho Goal 1   Goal Description pt will be able to do all regular acitivites without LBP in 4wk  (not quite yet, doing more)   Target Date 06/30/19   Ortho Goal 2   Goal Description indep in home program for self magenement of sx in 4wk   Target Date 06/30/19   Subjective Report   Subjective Report doing exercises from here, has not done his weight machines, treadmill twice   Therapeutic Procedure/exercise   Therapeutic Procedures: strength, endurance, ROM, flexibillity minutes (37120) 25   Skilled Intervention add back in own HEP, demo and set up machines here   Patient Response no LBP   Treatment Detail lat pull 30# x15, rows 20# x15, side step outs 13# x5B, 7# step outs with UE extended, [id number], prone hip ext x10 B, bridge x15   Plan   Plan for next session see in 2-3 weeks, will self monitor as he increases exercises  Patient has not been seen in over 30 days and will be discharged at this time to his home program.       Total Session Time   Timed Code Treatment Minutes 25   Total Treatment Time (sum of timed and untimed services) 25   Kris Hoenk, PT #[id number]  [facility]

## 2019-07-10 NOTE — ADDENDUM NOTE
Encounter addended by: Hoenk, Kris, PT on: 7/10/2019 10:28 AM   Actions taken: Flowsheet data copied forward, Sign clinical note, Flowsheet accepted, Episode resolved

## 2019-07-15 ENCOUNTER — TELEPHONE (OUTPATIENT)
Dept: FAMILY MEDICINE | Facility: CLINIC | Age: 69
End: 2019-07-15

## 2019-07-15 DIAGNOSIS — R45.86 MOOD CHANGES: ICD-10-CM

## 2019-07-15 NOTE — TELEPHONE ENCOUNTER
"Requested Prescriptions   Pending Prescriptions Disp Refills     citalopram (CELEXA) 20 MG tablet [Pharmacy Med Name: CITALOPRAM 20MG TABLETS] 30 tablet 0     Sig: TAKE 1 TABLET(20 MG) BY MOUTH DAILY  Last Written Prescription Date:  5/9/2019  Last Fill Quantity: 30,  # refills: 1   Last office visit: 5/9/2019 with prescribing provider:  Yesenia   Future Office Visit:   Next 5 appointments (look out 90 days)    Jul 25, 2019 11:30 AM CDT  [facility] [address] [address]  [phone]                SSRIs Protocol Passed - 7/15/2019  8:59 AM  NOLAN-7 SCORE 5/9/2019   Total Score 10     PHQ-9 SCORE 5/9/2019   PHQ-9 Total Score 12           Passed - Recent (12 mo) or future (30 days) visit within the authorizing provider's specialty     Patient had office visit in the last 12 months or has a visit in the next 30 days with authorizing provider or within the authorizing provider's specialty.  See \"Patient Info\" tab in inbasket, or \"Choose Columns\" in Meds & Orders section of the refill encounter.              Passed - Medication is active on med list        Passed - Patient is age 18 or older          "

## 2019-07-15 NOTE — LETTER
[facility]  [address] [address]  Phone: [phone]       July 16, 2019         David Clark  [address] [address]            Dear David:    We are concerned about your health care.  We recently provided you with medication refills.  Many medications require routine follow-up with your doctor.    Your prescription(s) have been refilled for 30 days so you may have time for the above noted follow-up. Please call to schedule soon so we can assure you have an appointment before your next refills are needed.    Thank you,      Steve Patterson MD / carmelo

## 2019-07-16 RX ORDER — CITALOPRAM HYDROBROMIDE 20 MG/1
TABLET ORAL
Qty: 30 TABLET | Refills: 0 | Status: SHIPPED | OUTPATIENT
Start: 2019-07-16 | End: 2019-08-08

## 2019-07-16 NOTE — TELEPHONE ENCOUNTER
Routing refill request to provider for review/approval because:  Patient needs to be seen because:  On 5/9/19, pt was increased to 20 mg daily of citalopram and advised to recheck with Dr Patterson in one month.  This was prescribed for mood changes.  Needs appt.    Nazia Frazier RN

## 2019-07-16 NOTE — TELEPHONE ENCOUNTER
Covering for primary/ordering provider    Short refill signed.  Recommend follow-up with primary care provider upon his return

## 2019-07-23 NOTE — PROGRESS NOTES
Name: David Clark MRN: [id number]  : 1950  MELÉNDEZ: 2019  Staff: KENTON Tech: NN Age: 68  Sex: M Hand: RH Educ: 20  Vision: 20/25 ?with correction / ?without correction    ORIENTATION     Time  -0     Place  .     Personal info          Presidents     WRAT4   SS %ile Grade Equiv.     Word Reading  101 53 12.5    WAIS-IV   Raw SSa     Similarities  24 10     Vocabulary  44 12     Block Design  20 6     Matrix Reasoning 21 14     Digit Span  14 4     Coding  49 9    COWAT (CFL)   Raw: 24   SS: 7 %ile: 11-18    ANIMAL NAMING TEST   Raw: 8   SS: 3 T: 18    BOSTON NAMING TEST   Raw: 52   SS: 8 %ile: 19-28    CLOCK DRAWING: Borderline    FINGER TAPPING    Avg  SS T   RH  41 6 34   LH 40.33 8 42    TRAILS  Raw  Err SS %ile   A 39  0 9 29-40   B 168  0 5 3-5    PORTEUS MAZE TEST   Test Age: 15.5    JOCE-O    Raw    T %ile     Time to Copy  304      >16     Copy    29     6-10     Short Delay Recall 7.5 34 5     Long Delay Recall 3 20 <1     Recognition Total 19 43 24    AVLT (>55, MOANS norms)     Trial 1 2 3 4 5 B 6 30              4 6 8 6 6 3 5 3       Raw SS %ile     Trial 1    4 8 9-22     Learning Over Trials  10 8 15-24     Short Delay Recall  5 7 9-17     30  Recall   3 6 5-14     30  Recognition Hits/FPs  13/2 10 28-35    WMS-IV  Raw SS / %ile     LM I  17 6     LM II  14 7     LM Recog. 19 3rd-9th     VR I  29 9     VR II  18 9     VR Recog. 6 >75th     Symbol Span 15 8    BDI-II  Raw:  5  Interpretation: minimal    RAFI  Raw:  5  Interpretation: minimal

## 2019-07-23 NOTE — PROGRESS NOTES
NAME: David Clark  MRN: [id number]  : 1950  MELÉNDEZ: 2019  Neuro[facility]ology [facility]  [address]  [address]  [phone]    NEUROPSYCHOLOGICAL EVALUATION    RELEVANT HISTORY AND REASON FOR REFERRAL    This is a report of neuropsychological consultation regarding David Clark, a 68-year-old, right-handed man with 20 years of formal education (PhD in engineering/physics). He presents with concerns about cognitive changes over the last three years or so. He had a significant confusional episode in 2018, and EEG afterwards was seen as abnormal with possible epileptiform discharges and indications of mild encephalopathy; there was also a question of REM sleep behavior disorder. He has seen Dr. Albaro Victoria for neurologic evaluations. In 2018, cognitive screening with the Mini-Cog was normal (5/5). In May 2019, screening with the MoCA was abnormal (22/30). He has also shown a positional hand tremor on neurologic exams, in the left hand greater than the right. He saw an occupational therapist for a functional evaluation in  of this year, with the results indicating mild, cognitively-based impairment in ADLs (CPT 4.8/5.6). Dr. Victoria has ordered this neuropsychological evaluation of brain functioning.    Additional medical concerns include increasing anxiousness, urinary control problems, obstructive sleep apnea, hyperlipidemia, erectile dysfunction, and prediabetes. Current medications include citalopram, oxybutynin, mirabegron, and sildenafil.     The report from brain MRI from 2018 reads,  Mild cerebral atrophy is present. Brain parenchyma is otherwise normal. Vascular structures are patent at the skull base. Postcontrast images do not show any abnormal areas of enhancement or any focal mass lesions. There is some mildly prominent iron deposition in the globus pallidus and substantia nigra regions.      In interview, Dr. Clark  tells me he gets mentally foggy whenever he takes oxybutynin. He has been taking it for about two or three months. His wife says there were very clear cognitive changes before this medication was started. His wife has taken over all his appointment scheduling, and she does all the household financial management. He is becoming more and more nervous about driving. He will drive if his wife is with him. Driving hesitance and general tendencies to not leave the house have been apparent since December 2018.    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 He is currently working on training people to take over for him, and he is working less and less now.    He and his wife have been  since 1982. They have three children. About three years ago, his children started making comments about his cognitive changes. One of his sons occasionally lives with them and works in the lab with him.    The period of intense confusion in June 2018 lasted about 60 to 90 minutes. He and his wife report no other such episodes. His hand tremors apparently come and go, and Dr. Clark thinks it is tied to citalopram and another medication (he is not sure which one). His says he moves and walks more slowly these days, and overall he is much slower in carrying out activities. He says that sometimes his left hand moves in the wrong direction when he is typing. He fell about a week ago. He attributes this to getting new bifocals and not being used to them. There have been no abnormal changes in his vision. He denies any changes to his senses of taste and hearing. His wife tells me that he is had anosmia for at least a couple of years. For example, he cannot smell things that are burning. Neurologic history is negative for stroke or seizure. He does not have problems with migraines. He reports a high school concussion in which he had about 12 hours of confusion or potential posttraumatic amnesia, though there  was no loss of consciousness and other people told him he was interactive during the entire period that he cannot remember.    He reports some concerns about bladder dysfunction; oxybutynin was recently increased with good effect. He is otherwise rather healthy, and he frequently exercises.    Dr. Clark tells me that he feels emotionally better since recently starting citalopram. His wife agrees and sees marked improvement in his mood and anxiety. This is the first time in his life he has ever had clinical attention to mental health. He ties the onset of mental health concerns to starting tamsulosin or some other medication for urinary function. He denies any suicidality. He describes some peripheral visual misperceptions, but there have been no bing hallucinatory experiences. His basic personality and temperament are unchanged, though his wife notes that he is beginning to add immaterial comments to conversations.    He reports that his sleep is pretty good with recent increases in oxybutynin, with much less interference from nocturia. His wife describes REM sleep behaviors. There was a significant amount of sleepwalking last summer, as well as kicking, dancing, ice skating, and roller skating in his sleep. She also observes talking, singing, and generally making a lot of noise while he sleeps.    He reports alcohol habits that average out to less than one drink per week. There is no history of alcohol problems. He does not use tobacco or illicit drugs.    There is no known history of early life medical abnormalities or developmental trajectory delays. He was always a high achiever in school and never had any academic problems. He earned his PhD here at the [employer] [facility], with training including chemical engineering, electrical engineering, and physics. 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    Reported family medical history is notable for his mother losing the use of her legs around age 75. The family was apparently told she had ALS, but there was some uncertainty and some question of whether or not it was Guillain-Barré syndrome. His father demonstrated cognitive decline late in life, and they suspected the effects of general anesthesia from multiple surgeries. His maternal grandmother required skilled nursing care for severe dementia with violent tendencies. His siblings are all currently healthy.    BEHAVIORAL OBSERVATIONS    Dr. Clark was polite and cooperative with the evaluation. He was open and candid in interview, but he had difficulty providing an accurate history and his wife offered significant corrections. He had a neutral affective display, but not necessarily wooden or masked. Bilateral hand tremors were seen at rest and in action. His gait was stiff and a little shuffling. His fingernails were of various length, with some chipped and jagged. He was alert and not somnolent. Immediate attentional control was variable. His speech was somewhat pressured at times. Speech was fluent, prosodic, and normally articulated. Content of speech could be somewhat tangential in interview, and there were moments in which he made seemingly nonsensical statements during the testing session (e.g., instructed to look at a geometric design and commit it to memory, then he states, \"It doesn t even spell anything.\"). He seemed somewhat doubtful of his abilities, but this varied significantly from task to task. His effort and persistence were good. The test results are seen as valid estimations of his cognitive status.    MEASURES ADMINISTERED    The following measures were administered by a trained psychometrist, under my direct supervision:    Orientation Questionnaire: Time, Place, Basic Personal Information, Recent US Presidents; Wide Range Achievement Test 4: Word Reading; Wechsler " Adult Intelligence Scale-IV: Similarities, Vocabulary, Block Design, Matrix Reasoning, Digit Span, Coding; Controlled Oral Word Association Test; Animal Naming Test; [place] Naming Test; Jayesh Visual Acuity Screen; Clock Drawing; Finger Tapping; Trail Making Test; Porteus Maze Test; Edmund-Osterrieth Complex Figure Test; Edmund Auditory Verbal Learning Test; Wechsler Memory Scale-IV: Logical Memory, Visual Reproduction, Symbol Span; Montaño Depression Inventory-II; Montaño Anxiety Inventory.    RESULTS AND INTERPRETATION    Orientation: Orientation to time was normal. Orientation the place was below normal expectations. Orientation to basic personal information was normal. He was able to name five of the last seven [place] presidents.    Intellectual Abilities: Abstract analogical reasoning was average. Demonstrating vocabulary knowledge was on the higher side of average. Visual reasoning through pattern identification was high average, while hands-on reasoning through object assembly was at the lower limits of the normal range.    Language & Related Skills: Basic reading and pronunciation skills were average. Confrontation naming was lower average. Semantic verbal fluency was impaired. Letter-based verbal fluency was below average, with marked errors.    Visual Perceptual & Constructional Skills: Binocular, corrected, near-point visual acuity was 20/25 on Jayesh screening. None of his errors on the confrontation naming task could be attributed to misperceptions. Clock drawing was of borderline quality at best. His copy of a complex geometric figure was of borderline quality, with an approach that demonstrated a lack of planning, piecemeal drawing, and limited gestalt apprehension.    Motor Skills: Speeded finger tapping performances were borderline to low average and bilaterally equivalent.    Mental Speed & Executive Functioning: As noted above, associative verbal fluency performances were below normal expectations, both in  terms of total output and increased error rates. Graphomotor clerical speed was average. Visual scanning and graphomotor sequencing under simple conditions was average. Scanning and sequencing under greater executive demands to control divided attention was borderline impaired. Planning, foresight, and learning from errors were within the normal range on an unspeeded maze-solving test. As noted above, his approach to the complex figure copy indicated issues with mental organization and planning, and his clock drawing took more than three minutes to complete yet was of borderline quality at best.    Attention & Working Memory: Immediate auditory attention and working memory were borderline impaired for repeating and rearranging digit strings. Immediate visual attention and working memory were on the lower average side for reproducing sequences of designs.    Learning & Anterograde Memory: A few minutes after the initial copy with limited attention to detail, free recall of the complex figure was in the borderline impaired range. After 30 minutes, recall of the figure dropped into the very impaired range, while recognition of individual figure elements was low average. On another test, immediate visual memory for simple designs was average, delayed free recall of the simple designs was average, and recognition of them was high average. Immediate verbal memory for short stories was below average. Delayed story recall was low average, and recognition of story details was borderline impaired. Single-trial learning of an extensive word list was below average. Additional learning of the list over repeated readings was low average. Free recall of the list was borderline to low average after a brief delay with active interference. List recall remained borderline to low average after a 30-minute delay, and delayed recognition memory was low average.    Emotional Functioning: On brief self-report inventories, he endorsed  minimally present symptoms related to depression (BDI-II = 5) and anxiety (RAFI = 5).    IMPRESSIONS    The neuropsychological results are abnormal.     On the whole, Dr. Clark's performances are largely below expectations for his level of academic and vocational achievement. Only one test performance (visual reasoning through pattern identification) reaches a range that is above average. About half of the test performances today fall within the middle average range. The remainder are borderline impaired or clearly impaired. The most prominent issues are complex spatial processing/visuoconstructional abilities, executive/attentional control, mental speed, and psychomotor speed. Learning and memory performances are inefficient but do not indicate rapid forgetting (i.e., more likely issues related to mental organization and attentional control).    There are no indications of etiologically significant mood or anxiety concerns. His recent changes in these regards likely reflect the same underlying neurodegenerative process that is affecting his cognitive functioning.    Prominent visuospatial and executive problems, inefficiency, cognitive slowing, motoric slowing, growing anxiety, stiff and shuffling gait, tremor, REM sleep behaviors, and anosmia suggest the early stages of Lewy body dementia. By cognitive scores alone, he does not qualify for a diagnosis of dementia at this time--though he is less able to manage instrumental ADLs on his own. Overall, a diagnosis of mild cognitive impairment would be most appropriate. His high functional baseline and good physical health may hyun well for a slow rate of change.     RECOMMENDATIONS    Continued neurologic care and monitoring are needed. I defer to Dr. Victoria regarding additional workups or treatments options, but it does seem reasonable to consider cholinergic therapy and perhaps to review medications that could have anticholinergic effects.    Dr. 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 In the context of suspected neurodegenerative disease, he and his wife should continue planning for future changes. They may wish to seek out resources from a  or from the Lewy Body Dementia Association (lbda.org), for support and guidance in these regards.    He should continue his lifestyle of regular physical activity. He should remain cognitively engaged to the extent possible, in pursuits that are intrinsically enjoyable to him. He should be encouraged to work against his impulses to isolate and stay at home, and it would be ideal to have enjoyable social interactions built into his regular schedule.    I recommend longitudinal neuropsychological monitoring. A return for reevaluation in about 12 months would be appropriate. I would always be happy to see him sooner or later than that, as clinically indicated.    Rodrigo Wilder, PhD, LP, ABPP-CN  Board Certified in Clinical Neuropsychology  Licensed Psychologist [id number]     Department of Rehabilitation Medicine  Division of Adult Neuropsychology  [facility]      Time spent: One hour neurobehavioral status exam including interview, clinical assessment by licensed and board-certified neuropsychologist (CPT 74653). One hour neuropsychological testing evaluation by licensed and board-certified neuropsychologist, including integration of patient data, interpretation of standardized test results and clinical data, clinical decision-making, treatment planning, report, and interactive feedback to the patient, first hour (CPT 84959). One hour of neuropsychological testing evaluation by licensed and board-certified neuropsychologist, including integration of patient data, interpretation of standardized test results and clinical data, clinical decision-making, treatment planning,  report, and interactive feedback to the patient, subsequent hours (CPT 43545). 30 minutes of psychological and neuropsychological test administration and scoring by technician, first 30 minutes (CPT 28851). 140 minutes psychological or neuropsychological test administration and scoring by technician, subsequent 30-minute intervals (CPT 60607). Diagnoses: G31.84

## 2019-07-24 NOTE — PROGRESS NOTES
ESTABLISHED PATIENT NEUROLOGY NOTE    DATE OF VISIT: 7/25/2019  CLINIC LOCATION: [facility]  MRN: [id number]  PATIENT NAME: David Clark  YOB: 1950    PCP: Steve Patterson MD    REASON FOR VISIT:   Chief Complaint   Patient presents with     Memory Loss     sx:has seen improvement. pt states he can do things he was not able to do before     SUBJECTIVE:                                                      HISTORY OF PRESENT ILLNESS: Patient is here for follow up regarding cognitive issues.  He was seen on 05/16/2019.  At that time additional work-up was ordered.  Please refer to my initial/other prior notes for further information.  The patient is accompanied by his wife, who participates in the interview.    Since the last visit, the patient reports improvement.  His typing is better and he does not have to be conscious about using his left hand anymore.  Celexa helps his mood/anxiety.  He also switched oxybutynin to Myrbetriq, which helped his postural hand tremor and cognition.  Has difficulty remembering names, but it was his long-term issue.  Denies interval development of new focal neurological symptoms.  His wife agrees with patient's report.  She does not have any additional concerns.    Screening lab work-up performed at the previous visit, including methylmalonic acid, vitamin B1 and B12 levels, was normal.    CPT from 06/07/2019 was 4.8/5.6 rounded down to 4.5/5.6 consistent with mild to moderate cognitive functional disability.  Neuropsychologic evaluation from 07/03/2019 demonstrated performances below expectations for his level of academic and vocational achievement.  Only visual reasoning was above average.  Complex spatial processing, visual constructional abilities, executive/attentional control, mental speed, and psychomotor speed were impaired.  These findings felt consistent with mild cognitive impairment and possibly suggestive of early stages of Lewy body  "dementia, though by itself scores were not sufficient to diagnose dementia at that time.  Reevaluation in 12 months was advised.    On review of systems, patient endorses no other active complaints. Medications, allergies, family and social history were also reviewed. There are no changes reported by patient.  REVIEW OF SYSTEMS:                                                    10-system review was completed. Pertinent positives are included in HPI. The remainder of ROS is negative.  EXAM:                                                    Physical Exam:   Vitals: /75 (BP Location: Right arm, Patient Position: Sitting, Cuff Size: Adult Regular)   Pulse 63   Temp 97.7  F (36.5  C) (Oral)   Ht 1.772 m (5' 9.75\")   Wt 92.4 kg (203 lb 9.6 oz)   SpO2 96%   BMI 29.42 kg/m      General: pt is in NAD, cooperative.  Skin: normal turgor, moist mucous membranes, no lesions/rashes noticed.  HEENT: ATNC, white sclera, normal conjunctiva.  Respiratory: Symmetric lung excursion, no accessory respiratory muscle use.  Abdomen: Non distended.  Neurological: awake, cooperative, follows commands, no aphasia or dysarthria noted, cranial nerves II-XII: no ptosis, extraocular motility is full, face is symmetric, tongue is midline, equally moves all extremities, normal tone in upper and lower extremities even with provoking maneuvers, sensation to the light touch is intact bilaterally, pronator drift is negative, finger to nose intact bilaterally, casual gait is normal with preserved stride length and arm swings, pull test is negative.  Slightly stooped forward posture.  DATA:   Labs/CPT/neuropsychologic testing: I personally reviewed pertinent labs, CPT and neuropsychology report as detailed in history of present illness.  ASSESSMENT AND PLAN:                                                    Assessment: 68-year-old male patient with single episode of confusion/disorientation presents for follow-up of cognitive problems " after the completion of recommended work-up.    We had a detailed discussion regarding results.  Laboratory work-up for treatable causes of cognitive dysfunction was unrevealing.  CPT was 4.5/5.6, consistent with mild to moderate cognitive functional disability.  Neuropsychologic testing was consistent with mild cognitive impairment that felt possibly suggestive of early stages of Lewy body dementia.  I reviewed with the patient that I am not quite convinced that the clinical picture would fit Lewy body dementia due to absence of any parkinsonian features.  He has mild cognitive impairment, but it might also be due to other neurodegenerative conditions and continued neurological follow-up is needed.  We also reviewed that currently there are no available treatment options to reverse this process, though a trial of anticholinesterase inhibitor might be considered.  At the present time, the patient and his wife are not interested in medications without proven clinical improvement.  I would also support repeating neuropsychologic testing in 1 year from now.    Diagnoses:    ICD-10-CM    1. Mild cognitive impairment G31.84      Plan: At today's visit we thoroughly discussed current symptoms, results of evaluation (mild cognitive impairment), available management options, and the plan.  I would like to continue neurological observation at the regular intervals and will plan to see him in approximately 6 months from now to repeat cognitive testing (MoCA).  We will also plan to repeat neuropsychologic evaluation in approximately 1 year.    No medication changes.    I counseled the patient to stay physically and mentally active with particular emphasis on daily mentally stimulating activities of  his choice (such as crosswords, puzzles, sudoku, etc.), stretching exercises, walking, and healthy eating.    Next follow-up appointment is in the next 6 months or earlier if needed.    Total Time: 43 minutes with > 50% spent  counseling the patient and his wife on stated above assessment and recommendations.    Albaro Victoria MD  HP/ Neurology

## 2019-07-25 ENCOUNTER — OFFICE VISIT (OUTPATIENT)
Dept: NEUROLOGY | Facility: CLINIC | Age: 69
End: 2019-07-25
Payer: MEDICARE

## 2019-07-25 VITALS
WEIGHT: 203.6 LBS | OXYGEN SATURATION: 96 % | HEIGHT: 70 IN | TEMPERATURE: 97.7 F | SYSTOLIC BLOOD PRESSURE: 124 MMHG | HEART RATE: 63 BPM | DIASTOLIC BLOOD PRESSURE: 75 MMHG | BODY MASS INDEX: 29.15 KG/M2

## 2019-07-25 DIAGNOSIS — G31.84 MILD COGNITIVE IMPAIRMENT: Primary | ICD-10-CM

## 2019-07-25 PROCEDURE — 99215 OFFICE O/P EST HI 40 MIN: CPT | Performed by: PSYCHIATRY & NEUROLOGY

## 2019-07-25 ASSESSMENT — MIFFLIN-ST. JEOR: SCORE: 1700.8

## 2019-07-25 NOTE — LETTER
7/25/2019         RE: David Clark  [address] [facility] [address]        Dear Colleague,    Thank you for referring your patient, David Clark, to the [facility]. Please see a copy of my visit note below.    ESTABLISHED PATIENT NEUROLOGY NOTE    DATE OF VISIT: 7/25/2019  CLINIC LOCATION: [facility]  MRN: [id number]  PATIENT NAME: David Clark  YOB: 1950    PCP: Steve Patterson MD    REASON FOR VISIT:   Chief Complaint   Patient presents with     Memory Loss     sx:has seen improvement. pt states he can do things he was not able to do before     SUBJECTIVE:                                                      HISTORY OF PRESENT ILLNESS: Patient is here for follow up regarding cognitive issues.  He was seen on 05/16/2019.  At that time additional work-up was ordered.  Please refer to my initial/other prior notes for further information.  The patient is accompanied by his wife, who participates in the interview.    Since the last visit, the patient reports improvement.  His typing is better and he does not have to be conscious about using his left hand anymore.  Celexa helps his mood/anxiety.  He also switched oxybutynin to Myrbetriq, which helped his postural hand tremor and cognition.  Has difficulty remembering names, but it was his long-term issue.  Denies interval development of new focal neurological symptoms.  His wife agrees with patient's report.  She does not have any additional concerns.    Screening lab work-up performed at the previous visit, including methylmalonic acid, vitamin B1 and B12 levels, was normal.    CPT from 06/07/2019 was 4.8/5.6 rounded down to 4.5/5.6 consistent with mild to moderate cognitive functional disability.  Neuropsychologic evaluation from 07/03/2019 demonstrated performances below expectations for his level of academic and vocational achievement.  Only visual reasoning was above average.   "Complex spatial processing, visual constructional abilities, executive/attentional control, mental speed, and psychomotor speed were impaired.  These findings felt consistent with mild cognitive impairment and possibly suggestive of early stages of Lewy body dementia, though by itself scores were not sufficient to diagnose dementia at that time.  Reevaluation in 12 months was advised.    On review of systems, patient endorses no other active complaints. Medications, allergies, family and social history were also reviewed. There are no changes reported by patient.  REVIEW OF SYSTEMS:                                                    10-system review was completed. Pertinent positives are included in HPI. The remainder of ROS is negative.  EXAM:                                                    Physical Exam:   Vitals: /75 (BP Location: Right arm, Patient Position: Sitting, Cuff Size: Adult Regular)   Pulse 63   Temp 97.7  F (36.5  C) (Oral)   Ht 1.772 m (5' 9.75\")   Wt 92.4 kg (203 lb 9.6 oz)   SpO2 96%   BMI 29.42 kg/m       General: pt is in NAD, cooperative.  Skin: normal turgor, moist mucous membranes, no lesions/rashes noticed.  HEENT: ATNC, white sclera, normal conjunctiva.  Respiratory: Symmetric lung excursion, no accessory respiratory muscle use.  Abdomen: Non distended.  Neurological: awake, cooperative, follows commands, no aphasia or dysarthria noted, cranial nerves II-XII: no ptosis, extraocular motility is full, face is symmetric, tongue is midline, equally moves all extremities, normal tone in upper and lower extremities even with provoking maneuvers, sensation to the light touch is intact bilaterally, pronator drift is negative, finger to nose intact bilaterally, casual gait is normal with preserved stride length and arm swings, pull test is negative.  Slightly stooped forward posture.  DATA:   Labs/CPT/neuropsychologic testing: I personally reviewed pertinent labs, CPT and " neuropsychology report as detailed in history of present illness.  ASSESSMENT AND PLAN:                                                    Assessment: 68-year-old male patient with single episode of confusion/disorientation presents for follow-up of cognitive problems after the completion of recommended work-up.    We had a detailed discussion regarding results.  Laboratory work-up for treatable causes of cognitive dysfunction was unrevealing.  CPT was 4.5/5.6, consistent with mild to moderate cognitive functional disability.  Neuropsychologic testing was consistent with mild cognitive impairment that felt possibly suggestive of early stages of Lewy body dementia.  I reviewed with the patient that I am not quite convinced that the clinical picture would fit Lewy body dementia due to absence of any parkinsonian features.  He has mild cognitive impairment, but it might also be due to other neurodegenerative conditions and continued neurological follow-up is needed.  We also reviewed that currently there are no available treatment options to reverse this process, though a trial of anticholinesterase inhibitor might be considered.  At the present time, the patient and his wife are not interested in medications without proven clinical improvement.  I would also support repeating neuropsychologic testing in 1 year from now.    Diagnoses:    ICD-10-CM    1. Mild cognitive impairment G31.84      Plan: At today's visit we thoroughly discussed current symptoms, results of evaluation (mild cognitive impairment), available management options, and the plan.  I would like to continue neurological observation at the regular intervals and will plan to see him in approximately 6 months from now to repeat cognitive testing ([facility]).  We will also plan to repeat neuropsychologic evaluation in approximately 1 year.    No medication changes.    I counseled the patient to stay physically and mentally active with particular emphasis on  daily mentally stimulating activities of   his choice (such as crosswords, puzzles, sudoku, etc.), stretching exercises, walking, and healthy eating.    Next follow-up appointment is in the next 6 months or earlier if needed.    Total Time: 43 minutes with > 50% spent counseling the patient and his wife on stated above assessment and recommendations.    Albaro Victoria MD  / Neurology      Again, thank you for allowing me to participate in the care of your patient.        Sincerely,        Albaro Victoria MD

## 2019-07-25 NOTE — PATIENT INSTRUCTIONS
AFTER VISIT SUMMARY (AVS):    At today's visit we thoroughly discussed current symptoms, results of evaluation (mild cognitive impairment), available management options, and the plan.  I would like to continue neurological observation at the regular intervals and will plan to see you in approximately 6 months from now to repeat cognitive testing.  We will also plan to repeat neuropsychologic evaluation in approximately 1 year.    No medication changes.    Stay physically and mentally active with particular emphasis on daily mentally stimulating activities of your choice (such as crosswords, puzzles, sudoku, etc.), stretching exercises, walking, and healthy eating.    Next follow-up appointment is in the next 6 months or earlier if needed.    Please do not hesitate to call me with any questions or concerns.    Thanks.

## 2019-08-03 ENCOUNTER — MYC MEDICAL ADVICE (OUTPATIENT)
Dept: NEUROLOGY | Facility: CLINIC | Age: 69
End: 2019-08-03

## 2019-08-03 NOTE — LETTER
To whom it may concern:        David Clark is under my care.  In my opinion, due to his medical condition it is unsafe for him to cross high traffic road to get his mail, and for that reason his mailbox needs to be relocated.  Please contact me with any additional questions or concerns at the provided above phone number.    Sincerely,        Albaro Victoria MD.  [facility].

## 2019-08-05 NOTE — TELEPHONE ENCOUNTER
Dr Julien Kim I placed a copy of the letter on your desk for signature in the [place] office.  Please have TC or MA mail to the patient at his home address.  Elvi Ramirez RN

## 2019-08-08 ENCOUNTER — OFFICE VISIT (OUTPATIENT)
Dept: FAMILY MEDICINE | Facility: CLINIC | Age: 69
End: 2019-08-08
Payer: MEDICARE

## 2019-08-08 VITALS
SYSTOLIC BLOOD PRESSURE: 108 MMHG | HEART RATE: 67 BPM | DIASTOLIC BLOOD PRESSURE: 68 MMHG | TEMPERATURE: 98.1 F | OXYGEN SATURATION: 94 % | WEIGHT: 208.6 LBS | RESPIRATION RATE: 16 BRPM | BODY MASS INDEX: 30.15 KG/M2

## 2019-08-08 DIAGNOSIS — R45.86 MOOD CHANGES: ICD-10-CM

## 2019-08-08 PROCEDURE — 99214 OFFICE O/P EST MOD 30 MIN: CPT | Performed by: FAMILY MEDICINE

## 2019-08-08 RX ORDER — CITALOPRAM HYDROBROMIDE 20 MG/1
20 TABLET ORAL DAILY
Qty: 30 TABLET | Refills: 11 | Status: SHIPPED | OUTPATIENT
Start: 2019-08-08 | End: 2019-09-16

## 2019-08-08 ASSESSMENT — ANXIETY QUESTIONNAIRES
IF YOU CHECKED OFF ANY PROBLEMS ON THIS QUESTIONNAIRE, HOW DIFFICULT HAVE THESE PROBLEMS MADE IT FOR YOU TO DO YOUR WORK, TAKE CARE OF THINGS AT HOME, OR GET ALONG WITH OTHER PEOPLE: NOT DIFFICULT AT ALL
5. BEING SO RESTLESS THAT IT IS HARD TO SIT STILL: NOT AT ALL
6. BECOMING EASILY ANNOYED OR IRRITABLE: NOT AT ALL
GAD7 TOTAL SCORE: 0
2. NOT BEING ABLE TO STOP OR CONTROL WORRYING: NOT AT ALL
7. FEELING AFRAID AS IF SOMETHING AWFUL MIGHT HAPPEN: NOT AT ALL
3. WORRYING TOO MUCH ABOUT DIFFERENT THINGS: NOT AT ALL
1. FEELING NERVOUS, ANXIOUS, OR ON EDGE: NOT AT ALL

## 2019-08-08 ASSESSMENT — PATIENT HEALTH QUESTIONNAIRE - PHQ9
SUM OF ALL RESPONSES TO PHQ QUESTIONS 1-9: 3
5. POOR APPETITE OR OVEREATING: NOT AT ALL

## 2019-08-08 NOTE — PROGRESS NOTES
Subjective     David Clark is a 69 year old male who presents to clinic today for the following health issues:    HPI   Depression Followup    How are you doing with your depression since your last visit? Improved. Wife and patient states that it is a good medication for him. He states him mood and energy have increased and are good.    Are you having other symptoms that might be associated with depression? No    Have you had a significant life event?  No     Are you feeling anxious or having panic attacks?   No    Do you have any concerns with your use of alcohol or other drugs? No    Social History     Tobacco Use     Smoking status: Never Smoker     Smokeless tobacco: Never Used   Substance Use Topics     Alcohol use: Not Currently     Drug use: No     PHQ 5/9/2019   PHQ-9 Total Score 12   Q9: Thoughts of better off dead/self-harm past 2 weeks Not at all     NOLAN-7 SCORE 5/9/2019   Total Score 10     Today the PHQ is 3, and the NOLAN is 0    Suicide Assessment Five-step Evaluation and Treatment (SAFE-T)    Amount of exercise or physical activity: 2-3 days/week for an average of 30-45 minutes    Problems taking medications regularly: No    Medication side effects: none    Diet: High fiber      Current Outpatient Medications:      citalopram (CELEXA) 20 MG tablet, TAKE 1 TABLET(20 MG) BY MOUTH DAILY, Disp: 30 tablet, Rfl: 0     Glucosamine-Chondroit-Vit C-Mn (GLUCOSAMINE 1500 COMPLEX) CAPS, Take  by mouth., Disp: , Rfl:      mirabegron (MYRBETRIQ) 50 MG 24 hr tablet, Take 1 tablet (50 mg) by mouth daily, Disp: 90 tablet, Rfl: 3     Multiple Vitamin (MULTI-VITAMIN) per tablet, Take 1 tablet by mouth daily., Disp: , Rfl:      econazole nitrate 1 % cream, Apply topically 2 times daily (Patient not taking: Reported on 8/8/2019), Disp: 60 g, Rfl: 3     fish oil-omega-3 fatty acids (FISH OIL) 1000 MG capsule, Take 2-4 capsules by mouth daily., Disp: , Rfl:      ketoconazole (NIZORAL) 2 % shampoo, Apply to the affected area  and wash off after 5 minutes daily for first 2 weeks. (Patient not taking: Reported on 8/8/2019), Disp: 240 mL, Rfl: 11     sildenafil (VIAGRA) 100 MG tablet, Take 0.5-1 tablets ( mg) by mouth daily as needed Take 30 min to 4 hours before intercourse. (Patient not taking: Reported on 8/8/2019), Disp: 6 tablet, Rfl: 11    Patient Active Problem List   Diagnosis     Tinea versicolor     Thyroid nodule     Advanced directives, counseling/discussion     Erectile dysfunction, unspecified erectile dysfunction type     Prediabetes     Class 1 obesity due to excess calories with body mass index (BMI) of 31.0 to 31.9 in adult, unspecified whether serious comorbidity present     EDY (obstructive sleep apnea)     Hyperlipidemia LDL goal <130     Sensitivity to medication, initial encounter       Blood pressure 108/68, pulse 67, temperature 98.1  F (36.7  C), temperature source Tympanic, resp. rate 16, weight 94.6 kg (208 lb 9.6 oz), SpO2 94 %.    Exam:  GENERAL APPEARANCE: healthy, alert and no distress  PSYCH: mentation appears normal and affect normal/bright      (R45.86) Mood changes  Comment:   Plan: citalopram (CELEXA) 20 MG tablet        You are doing very well and stay on the med at 20 mg daily for at least three months. If desired then consider tapering the dose to 10 mg daily for 14 days and then if doing well then stop it.   If the symptoms recur then restart the dose. Use the lowest effective dose. If off the med and doing well then follow up in clinic only as needed. Use the non drug therapies.     Steve Patterson

## 2019-08-08 NOTE — PATIENT INSTRUCTIONS
(R44.22) Mood changes  Comment:   Plan: citalopram (CELEXA) 20 MG tablet        You are doing very well and stay on the med at 20 mg daily for at least three months. If desired then consider tapering the dose to 10 mg daily for 14 days and then if doing well then stop it.   If the symptoms recur then restart the dose. Use the lowest effective dose. If off the med and doing well then follow up in clinic only as needed. Use the non drug therapies.

## 2019-08-09 ASSESSMENT — ANXIETY QUESTIONNAIRES: GAD7 TOTAL SCORE: 0

## 2019-08-17 DIAGNOSIS — R45.86 MOOD CHANGES: ICD-10-CM

## 2019-08-19 RX ORDER — CITALOPRAM HYDROBROMIDE 20 MG/1
TABLET ORAL
Qty: 30 TABLET | Refills: 0 | OUTPATIENT
Start: 2019-08-19

## 2019-08-19 NOTE — TELEPHONE ENCOUNTER
"Requested Prescriptions   Pending Prescriptions Disp Refills     citalopram (CELEXA) 20 MG tablet [Pharmacy Med Name: CITALOPRAM 20MG TABLETS] 30 tablet 0     Sig: TAKE 1 TABLET(20 MG) BY MOUTH DAILY   Last Written Prescription Date:  8/8/19  Last Fill Quantity: 30 tab,  # refills: 11   Last office visit: 8/8/2019 with prescribing provider:  Steve Patterson     Future Office Visit:   Next 5 appointments (look out 90 days)    Nov 08, 2019 11:30 AM CST  [facility] Albaro [facility] [address] [place] [id number]  [phone]             SSRIs Protocol Passed - 8/17/2019  7:53 AM        Passed - Recent (12 mo) or future (30 days) visit within the authorizing provider's specialty     Patient had office visit in the last 12 months or has a visit in the next 30 days with authorizing provider or within the authorizing provider's specialty.  See \"Patient Info\" tab in inbasket, or \"Choose Columns\" in Meds & Orders section of the refill encounter.              Passed - Medication is active on med list        Passed - Patient is age 18 or older          "

## 2019-08-30 ENCOUNTER — OFFICE VISIT (OUTPATIENT)
Dept: SLEEP MEDICINE | Facility: CLINIC | Age: 69
End: 2019-08-30
Payer: MEDICARE

## 2019-08-30 VITALS
OXYGEN SATURATION: 95 % | DIASTOLIC BLOOD PRESSURE: 69 MMHG | BODY MASS INDEX: 29.92 KG/M2 | SYSTOLIC BLOOD PRESSURE: 107 MMHG | HEART RATE: 69 BPM | WEIGHT: 209 LBS | HEIGHT: 70 IN

## 2019-08-30 DIAGNOSIS — G47.33 OSA (OBSTRUCTIVE SLEEP APNEA): Primary | ICD-10-CM

## 2019-08-30 PROCEDURE — 99213 OFFICE O/P EST LOW 20 MIN: CPT | Performed by: PHYSICIAN ASSISTANT

## 2019-08-30 ASSESSMENT — MIFFLIN-ST. JEOR: SCORE: 1724.27

## 2019-08-30 NOTE — NURSING NOTE
"Chief Complaint   Patient presents with     CPAP Follow Up       Initial /69   Pulse 69   Ht 1.778 m (5' 10\")   Wt 94.8 kg (209 lb)   SpO2 95%   BMI 29.99 kg/m   Estimated body mass index is 29.99 kg/m  as calculated from the following:    Height as of this encounter: 1.778 m (5' 10\").    Weight as of this encounter: 94.8 kg (209 lb).    Medication Reconciliation: complete      "

## 2019-08-30 NOTE — PROGRESS NOTES
Obstructive Sleep Apnea - PAP Follow-Up Visit:    Chief Complaint   Patient presents with     CPAP Follow Up       David Clark comes in today for follow-up of their mild sleep apnea, managed with CPAP.     David Clark had a sleep study done on 7/11/2018 at the [facility] for memory concerns, snoring, witnessed apnea, daytime sleepiness, morning headaches, crowded oropharynx, large neck circumference and co-morbid DMII and HTN.    PSG date 7/11/2018-The combined apnea/hypopnea index was 8.9 events per hour (central apnea/hypopnea index was 2.0 events per hour). The REM AHI was 23.2 events per hour. The supine AHI was 10.7 events per hour. The RERA index was 4.3 events per hour. The RDI was 13.2 events per hour.  Baseline oxygen saturation was 93.0%. Lowest oxygen saturation was 87.5%. Time spent less than or equal to 88% was 0.1 minutes. Time spent less than or equal to 89% was 0.9 minutes. Movement Activity: Frequent PLM s (index 90) with rare cortical arousals (index 5.4). Small number of REM epochs with phasic lack of atonia, but would not appear to fit criteria for REM-sleep behavior disorder.    Overall, he rates the experience with PAP as 10 (0 poor, 10 great). The mask is comfortable.  The mask is not leaking .  He is not snoring with the mask on. He is not having gasp arousals.  He is not having significant oral/nasal dryness.   His PAP interface is Nasal Pillows.    Bedtime is typically 2300. Usually it takes about 5 min minutes to fall asleep with the mask on. Wake time is typically 0730.  Patient is using PAP therapy 8 hours per night. The patient is usually getting 8 hours of sleep per night.    He does feel rested in the morning.    Total score - [place]: 8 (8/30/2019 10:00 AM)      LISA Total Score: 3    ResMed   Auto-PAP 8.0 - 15.0 cmH2O 30 day usage data:    87% of days with > 4 hours of use. 0/30 days with no use.   Average use 430 minutes per day.   95%ile Leak 19.51 L/min.  "  CPAP 95% pressure 10.7 cm.   AHI 2.06 events per hour.     He reprots CPAP is going well. The energy levels have increased tremendously. He is very happy with CPAP.    Past medical/surgical history, family history, social history, medications and allergies were reviewed.      Problem List:  Patient Active Problem List    Diagnosis Date Noted     Sensitivity to medication, initial encounter 03/11/2019     Priority: Medium     Hyperlipidemia LDL goal <130 09/14/2018     Priority: Medium     EDY (obstructive sleep apnea) 08/29/2018     Priority: Medium     PSG date 7/11/2018-The combined apnea/hypopnea index was 8.9 events per hour (central apnea/hypopnea index was 2.0 events per hour). The REM AHI was 23.2 events per hour. The supine AHI was 10.7 events per hour. The RERA index was 4.3 events per hour. The RDI was 13.2 events per hour.  Baseline oxygen saturation was 93.0%. Lowest oxygen saturation was 87.5%. Time spent less than or equal to 88% was 0.1 minutes. Time spent less than or equal to 89% was 0.9 minutes. Movement Activity: Frequent PLM s (index 90) with rare cortical arousals (index 5.4). Small number of REM epochs with phasic lack of atonia, but would not appear to fit criteria for REM-sleep behavior disorder.        Class 1 obesity due to excess calories with body mass index (BMI) of 31.0 to 31.9 in adult, unspecified whether serious comorbidity present 07/10/2018     Priority: Medium     Erectile dysfunction, unspecified erectile dysfunction type 07/29/2016     Priority: Medium     Prediabetes 07/29/2016     Priority: Medium     Advanced directives, counseling/discussion 05/29/2013     Priority: Medium     Patient does not have an Advance/Health Care Directive (HCD), requests blank HCD form.    Nazia Jordy  May 29, 2013         Thyroid nodule 11/22/2010     Priority: Medium     Tinea versicolor 09/23/2010     Priority: Medium        /69   Pulse 69   Ht 1.778 m (5' 10\")   Wt 94.8 kg (209 " lb)   SpO2 95%   BMI 29.99 kg/m      Impression/Plan:  Mild Sleep apnea.   Tolerating PAP well. Daytime symptoms are improved.   Continue current treatment.  Comprehensive DME    David Clark will follow up in about 2 years or sooner if any concerns.    Fifteen minutes spent with patient, all of which were spent face-to-face counseling, consulting, coordinating plan of care regarding EDY.      Melanie Marinelli PA-C

## 2019-08-30 NOTE — PATIENT INSTRUCTIONS
Your BMI is Body mass index is 29.99 kg/m .  Weight management is a personal decision.  If you are interested in exploring weight loss strategies, the following discussion covers the approaches that may be successful. Body mass index (BMI) is one way to tell whether you are at a healthy weight, overweight, or obese. It measures your weight in relation to your height.  A BMI of 18.5 to 24.9 is in the healthy range. A person with a BMI of 25 to 29.9 is considered overweight, and someone with a BMI of 30 or greater is considered obese. More than two-thirds of American adults are considered overweight or obese.  Being overweight or obese increases the risk for further weight gain. Excess weight may lead to heart disease and diabetes.  Creating and following plans for healthy eating and physical activity may help you improve your health.  Weight control is part of healthy lifestyle and includes exercise, emotional health, and healthy eating habits. Careful eating habits lifelong are the mainstay of weight control. Though there are significant health benefits from weight loss, long-term weight loss with diet alone may be very difficult to achieve- studies show long-term success with dietary management in less than 10% of people. Attaining a healthy weight may be especially difficult to achieve in those with severe obesity. In some cases, medications, devices and surgical management might be considered.  What can you do?  If you are overweight or obese and are interested in methods for weight loss, you should discuss this with your provider.     Consider reducing daily calorie intake by 500 calories.     Keep a food journal.     Avoiding skipping meals, consider cutting portions instead.    Diet combined with exercise helps maintain muscle while optimizing fat loss. Strength training is particularly important for building and maintaining muscle mass. Exercise helps reduce stress, increase energy, and improves fitness.  Increasing exercise without diet control, however, may not burn enough calories to loose weight.       Start walking three days a week 10-20 minutes at a time    Work towards walking thirty minutes five days a week     Eventually, increase the speed of your walking for 1-2 minutes at time    In addition, we recommend that you review healthy lifestyles and methods for weight loss available through the National Institutes of Health patient information sites:  http://win.niddk.nih.gov/publications/index.htm    And look into health and wellness programs that may be available through your health insurance provider, employer, local community center, or titi club.    Weight management plan: Patient was referred to their PCP to discuss a diet and exercise plan.

## 2019-09-16 ENCOUNTER — OFFICE VISIT (OUTPATIENT)
Dept: FAMILY MEDICINE | Facility: CLINIC | Age: 69
End: 2019-09-16
Payer: MEDICARE

## 2019-09-16 VITALS
HEART RATE: 63 BPM | HEIGHT: 70 IN | OXYGEN SATURATION: 93 % | SYSTOLIC BLOOD PRESSURE: 108 MMHG | DIASTOLIC BLOOD PRESSURE: 64 MMHG | BODY MASS INDEX: 29.92 KG/M2 | RESPIRATION RATE: 20 BRPM | TEMPERATURE: 97.7 F | WEIGHT: 209 LBS

## 2019-09-16 DIAGNOSIS — E78.5 HYPERLIPIDEMIA LDL GOAL <130: ICD-10-CM

## 2019-09-16 DIAGNOSIS — R45.86 MOOD CHANGES: ICD-10-CM

## 2019-09-16 DIAGNOSIS — Z00.00 ENCOUNTER FOR MEDICARE ANNUAL WELLNESS EXAM: Primary | ICD-10-CM

## 2019-09-16 DIAGNOSIS — Z23 NEED FOR PROPHYLACTIC VACCINATION AND INOCULATION AGAINST INFLUENZA: ICD-10-CM

## 2019-09-16 LAB
CHOLEST SERPL-MCNC: 226 MG/DL
HDLC SERPL-MCNC: 48 MG/DL
LDLC SERPL CALC-MCNC: 145 MG/DL
NONHDLC SERPL-MCNC: 178 MG/DL
TRIGL SERPL-MCNC: 163 MG/DL

## 2019-09-16 PROCEDURE — 99213 OFFICE O/P EST LOW 20 MIN: CPT | Mod: 25 | Performed by: FAMILY MEDICINE

## 2019-09-16 PROCEDURE — 90662 IIV NO PRSV INCREASED AG IM: CPT | Performed by: FAMILY MEDICINE

## 2019-09-16 PROCEDURE — G0439 PPPS, SUBSEQ VISIT: HCPCS | Performed by: FAMILY MEDICINE

## 2019-09-16 PROCEDURE — G0008 ADMIN INFLUENZA VIRUS VAC: HCPCS | Performed by: FAMILY MEDICINE

## 2019-09-16 PROCEDURE — 80061 LIPID PANEL: CPT | Performed by: FAMILY MEDICINE

## 2019-09-16 PROCEDURE — 36415 COLL VENOUS BLD VENIPUNCTURE: CPT | Performed by: FAMILY MEDICINE

## 2019-09-16 RX ORDER — CITALOPRAM HYDROBROMIDE 20 MG/1
20 TABLET ORAL DAILY
Qty: 90 TABLET | Refills: 3 | Status: SHIPPED | OUTPATIENT
Start: 2019-09-16 | End: 2020-09-23

## 2019-09-16 ASSESSMENT — MIFFLIN-ST. JEOR: SCORE: 1724.27

## 2019-09-16 NOTE — PROGRESS NOTES
"  SUBJECTIVE:   David Clark is a 69 year old male who presents for Preventive Visit.    Are you in the first 12 months of your Medicare Part B coverage?  No    Physical Health:    In general, how would you rate your overall physical health? good    Outside of work, how many days during the week do you exercise? 1 day/week    Outside of work, approximately how many minutes a day do you exercise?30-45 minutes    If you drink alcohol do you typically have >3 drinks per day or >7 drinks per week? No    Do you usually eat at least 4 servings of fruit and vegetables a day, include whole grains & fiber and avoid regularly eating high fat or \"junk\" foods? Yes    Do you have any problems taking medications regularly?  No    Do you have any side effects from medications? none    Needs assistance for the following daily activities: no assistance needed    Which of the following safety concerns are present in your home?  lack of grab bars in the bathroom     Hearing impairment: No    In the past 6 months, have you been bothered by leaking of urine? no    Mental Health:    In general, how would you rate your overall mental or emotional health? good  PHQ-2 Score:      Do you feel safe in your environment? Yes    Do you have a Health Care Directive? Yes: Advance Directive has been received and scanned.    Additional concerns to address?    Chief Complaint   Patient presents with     Physical     Wellness physical exam.       Fall risk:  He feels the one fall is more related to having new glasses.  The other fall was his legs were twisted in the sheets in bed when getting up.  Fallen 2 or more times in the past year?: Yes  Any fall with injury in the past year?: Yes  Timed Up and Go Test (>13.5 is fall risk; contact physician) : 11    Cognitive Screenin) Repeat 3 items (Leader, Season, Table)    2) Clock draw: NORMAL  3) 3 item recall: Recalls 3 objects  Results: 3 items recalled: COGNITIVE IMPAIRMENT LESS " LIKELY    Mini-CogTM Copyright SARAY Nicole. Licensed by the author for use in [facility]; reprinted with permission (travis@.[email or web address]). All rights reserved.      Do you have sleep apnea, excessive snoring or daytime drowsiness?: yes, he has the CPAP machine.    Chief Complaint   Patient presents with     Physical     Wellness physical exam.     Reviewed and updated as needed this visit by clinical staff  Tobacco  Allergies  Meds  Med Hx  Surg Hx  Fam Hx  Soc Hx    Reviewed and updated as needed this visit by Provider        Social History     Tobacco Use     Smoking status: Never Smoker     Smokeless tobacco: Never Used   Substance Use Topics     Alcohol use: Yes     Comment: Beer-occ.                           Current providers sharing in care for this patient include:   Patient Care Team:  Steve Patterson MD as PCP - General (Family Practice)  Steve Patterson MD as Assigned PCP    The following health maintenance items are reviewed in Epic and correct as of today:  Health Maintenance   Topic Date Due     HEPATITIS C SCREENING  1950     ZOSTER IMMUNIZATION (1 of 2) 07/31/2001     AORTIC ANEURYSM SCREENING (SYSTEM ASSIGNED)  07/31/2016     ADVANCE CARE PLANNING  05/29/2018     INFLUENZA VACCINE (1) 09/01/2019     FALL RISK ASSESSMENT  09/14/2019     MEDICARE ANNUAL WELLNESS VISIT  09/14/2019     DTAP/TDAP/TD IMMUNIZATION (3 - Td) 09/23/2020     LIPID  09/14/2023     COLONOSCOPY  08/16/2026     PHQ-2  Completed     PNEUMOCOCCAL IMMUNIZATION 65+ LOW/MEDIUM RISK  Completed     IPV IMMUNIZATION  Aged Out     MENINGITIS IMMUNIZATION  Aged Out       ROS:  CONSTITUTIONAL: NEGATIVE for fever, chills, change in weight  INTEGUMENTARY/SKIN: NEGATIVE for worrisome rashes, moles or lesions  EYES: NEGATIVE for vision changes or irritation  ENT/MOUTH: NEGATIVE for ear, mouth and throat problems  RESP: NEGATIVE for significant cough or SOB  BREAST: NEGATIVE for masses, tenderness or discharge  CV:  "NEGATIVE for chest pain, palpitations or peripheral edema  GI: NEGATIVE for nausea, abdominal pain, heartburn, or change in bowel habits  : NEGATIVE for frequency, dysuria, or hematuria  MUSCULOSKELETAL: NEGATIVE for significant arthralgias or myalgia  NEURO: NEGATIVE for weakness, dizziness or paresthesias  ENDOCRINE: NEGATIVE for temperature intolerance, skin/hair changes  HEME: NEGATIVE for bleeding problems  PSYCHIATRIC: NEGATIVE for changes in mood or affect    OBJECTIVE:   /64   Pulse 63   Temp 97.7  F (36.5  C) (Tympanic)   Ht 1.778 m (5' 10\")   Wt 94.8 kg (209 lb)   SpO2 93%   BMI 29.99 kg/m   Estimated body mass index is 29.99 kg/m  as calculated from the following:    Height as of this encounter: 1.778 m (5' 10\").    Weight as of this encounter: 94.8 kg (209 lb).  EXAM:   GENERAL APPEARANCE: healthy, alert and no distress  EYES: EOMI,  PERRL  HENT: ear canals and TM's normal and nose and mouth without ulcers or lesions  NECK: no adenopathy, no asymmetry, masses, or scars and thyroid normal to palpation  RESP: lungs clear to auscultation - no rales, rhonchi or wheezes  CV: regular rates and rhythm, normal S1 S2, no S3 or S4 and no murmur, click or rub -  ABDOMEN:  soft, nontender, no HSM or masses and bowel sounds normal  GU_male: testicles normal without atrophy or masses, no hernias and penis normal without urethral discharge  MS: extremities normal- no gross deformities noted, no evidence of inflammation in joints, FROM in all extremities.  SKIN: no suspicious lesions or rashes  LYMPHATICS: No axillary, cervical, inguinal, or supraclavicular nodes      ASSESSMENT / PLAN:   1. Encounter for Medicare annual wellness exam  Patient currently has an advanced directive: Yes.  Practitioner is supportive of decision.    COUNSELING:  Reviewed preventive health counseling, as reflected in patient instructions       Regular exercise       Healthy diet/nutrition       Vision screening       Hearing " "screening    Estimated body mass index is 29.99 kg/m  as calculated from the following:    Height as of this encounter: 1.778 m (5' 10\").    Weight as of this encounter: 94.8 kg (209 lb).   reports that he has never smoked. He has never used smokeless tobacco.  Appropriate preventive services were discussed with this patient, including applicable screening as appropriate for cardiovascular disease, diabetes, osteopenia/osteoporosis, and glaucoma.  As appropriate for age/gender, discussed screening for colorectal cancer, prostate cancer, breast cancer, and cervical cancer. Checklist reviewing preventive services available has been given to the patient.    Reviewed patients plan of care and provided an AVS. The Basic Care Plan (routine screening as documented in Health Maintenance) for David meets the Care Plan requirement. This Care Plan has been established and reviewed with the Patient.    Counseling Resources:  ATP IV Guidelines  Pooled Cohorts Equation Calculator  Breast Cancer Risk Calculator  FRAX Risk Assessment  ICSI Preventive Guidelines  Dietary Guidelines for Americans, 2010  [employer] MyPlate  ASA Prophylaxis  Lung CA Screening    2. Mood changes  You are doing well and stay on the med and the non drug therapies. If doing well then refill and recheck annually.   - citalopram (CELEXA) 20 MG tablet; Take 1 tablet (20 mg) by mouth daily  Dispense: 90 tablet; Refill: 3    3. Hyperlipidemia LDL goal <130  Do the labs today we will notify of the results. You were 149 on the LDL in 2018. Use the lower chol diet and daily exercise.   - Lipid panel reflex to direct LDL Fasting      Steve Patterson MD  [facility]    He is at risk for falling and has been provided with information to reduce the risk of falling at home.  "

## 2019-09-16 NOTE — PATIENT INSTRUCTIONS
Patient Education   Personalized Prevention Plan  You are due for the preventive services outlined below.  Your care team is available to assist you in scheduling these services.  If you have already completed any of these items, please share that information with your care team to update in your medical record.  Health Maintenance Due   Topic Date Due     Hepatitis C Screening  1950     Zoster (Shingles) Vaccine (1 of 2) 07/31/2001     AORTIC ANEURYSM SCREENING (SYSTEM ASSIGNED)  07/31/2016     Discuss Advance Care Planning  05/29/2018     Flu Vaccine (1) 09/01/2019     FALL RISK ASSESSMENT  09/14/2019     Annual Wellness Visit  09/14/2019       Preventing Falls in the Home  An adult or child can fall for many reasons. If you are an older adult, you may fall because your reaction time slows down. Your muscles and joints may get stiff, weak, or less flexible because of illness, medicines, or a physical condition. These things can also make a child more likely to fall or be injured in a fall.  Other health problems that make falls more likely include:    Arthritis    Dizziness or lightheadedness when you get out of bed (orthostatic hypotension)    History of a stroke    Dizziness    Anemia    Certain medicines taken for mental illness    Problems with balance or gait    History of falls with or without an injury    Changes in vision (vision impairment)    Changes in thinking skills and memory (cognitive impairment)  Injuries from a fall can include broken bones, dislocated joints, and cuts. When these injuries are serious enough, they can make it impossible for you or a child who is injured in a fall to live on his or her own.  Prevention tips  To help prevent falls and fall-related injuries, follow the tips below.   Floors  Make floors safer by doing the following:     Put nonskid pads under area rugs.    Remove throw rugs.    Replace worn floor coverings.    Tack carpets firmly to each step on carpeted  stairs. Put nonskid strips on the edges of uncarpeted stairs.    Keep floors and stairs free of clutter and cords.    Arrange furniture so there are clear pathways.    Clean up any spills right away.    Wear shoes that fit.  Bathrooms    Make bathrooms safer by doing the following:     Install grab bars in the tub or shower.    Apply nonskid strips or put a nonskid rubber mat in the tub or shower.    Sit on a bath chair to bathe.    Use bathmats with nonskid backing.  Lighting and the environment  Improve lighting in your home by doing the following:     Keep a flashlight in each room. Or put a lamp next to the bed within easy reach.    Put nightlights in the bedrooms, hallways, kitchen, and bathrooms.    Make sure all stairways have good lighting.    Take your time when going up and down stairs.    Put handrails on both sides of stairs and in walkways for more support. To prevent injury to your wrist or arm, don t use handrails to pull yourself up.    Install grab bars to pull yourself up.    Move or rearrange items that you use often. This will make them easier to find or reach.    Look at your home to find any safety hazards. Especially look at doorways, walkways, and the driveway. Remove or repair any safety problems that you find.  Date Last Reviewed: 8/1/2016 2000-2018 [employer]. [address] [place] [address]. All rights reserved. This information is not intended as a substitute for professional medical care. Always follow your healthcare professional's instructions.            Thank you for choosing [facility].  You may be receiving an email and/or telephone survey request from [facility] Customer Experience regarding your visit today.  Please take a few minutes to respond to the survey to let us know how we are doing.      If you have questions or concerns, please contact us via [place] or you can contact your care team at [phone].    Our Clinic hours are:  Monday  "6:40 am  to 7:00 pm  Tuesday -Friday 6:40 am to 5:00 pm    The [place] outpatient lab hours are:  Monday - Friday 6:10 am to 4:45 pm  Saturdays 7:00 am to 11:00 am  Appointments are required, call [phone]    If you have clinical questions after hours or would like to schedule an appointment,  call the clinic at [phone].      ASSESSMENT / PLAN:   1. Encounter for Medicare annual wellness exam  Patient currently has an advanced directive: Yes.  Practitioner is supportive of decision.    COUNSELING:  Reviewed preventive health counseling, as reflected in patient instructions       Regular exercise       Healthy diet/nutrition       Vision screening       Hearing screening    Estimated body mass index is 29.99 kg/m  as calculated from the following:    Height as of this encounter: 1.778 m (5' 10\").    Weight as of this encounter: 94.8 kg (209 lb).   reports that he has never smoked. He has never used smokeless tobacco.  Appropriate preventive services were discussed with this patient, including applicable screening as appropriate for cardiovascular disease, diabetes, osteopenia/osteoporosis, and glaucoma.  As appropriate for age/gender, discussed screening for colorectal cancer, prostate cancer, breast cancer, and cervical cancer. Checklist reviewing preventive services available has been given to the patient.    Reviewed patients plan of care and provided an AVS. The Basic Care Plan (routine screening as documented in Health Maintenance) for David meets the Care Plan requirement. This Care Plan has been established and reviewed with the Patient.    Counseling Resources:  ATP IV Guidelines  Pooled Cohorts Equation Calculator  Breast Cancer Risk Calculator  FRAX Risk Assessment  ICSI Preventive Guidelines  Dietary Guidelines for Americans, 2010  USDA's MyPlate  ASA Prophylaxis  Lung CA Screening    2. Mood changes  You are doing well and stay on the med and the non drug therapies. If doing well then refill and " recheck annually.   - citalopram (CELEXA) 20 MG tablet; Take 1 tablet (20 mg) by mouth daily  Dispense: 90 tablet; Refill: 3    3. Hyperlipidemia LDL goal <130  Do the labs today we will notify of the results. You were 149 on the LDL in 2018. Use the lower chol diet and daily exercise.   - Lipid panel reflex to direct LDL Fasting

## 2019-09-25 DIAGNOSIS — G47.33 OSA (OBSTRUCTIVE SLEEP APNEA): Primary | ICD-10-CM

## 2019-09-27 NOTE — TELEPHONE ENCOUNTER
Patient had colonoscopy and EGD on 8/13/09.  Findings are below.    Pathologic Diagnosis :  A. DUODENUM, BIOPSY:  NO SIGNIFICANT HISTOLOGIC ABNORMALITY.    B. STOMACH, BIOPSY:  1. SLIGHT NON-SPECIFIC CHRONIC INFLAMMATION.  2. NO HELICOBACTER ORGANISMS IDENTIFIED BY SPECIAL STAIN.    C. GASTRIC POLYP, BIOPSY:  BENIGN FUNDIC GLAND POLYP.    D. ESOPHAGUS, BIOPSY:  1. GASTRIC-TYPE GLANDULAR MUCOSA, NEGATIVE FOR INTESTINAL METAPLASIA AND  DYSPLASIA.  2. SQUAMOUS MUCOSA WITH NO SIGNIFICANT HISTOLOGIC ABNORMALITY.    E. COLON, RANDOM BIOPSIES:  NO SIGNIFICANT HISTOLOGIC ABNORMALITY.    Patient asking if Cologuard would be appropriate/indicated/recommended given the findings above.       Reason for Call:  Other prescription    Detailed comments: girish faxed request asking to get 90 day supply on oxybutynin    Phone Number Patient can be reached at: Other phone number:  [phone]*    Best Time: any     Can we leave a detailed message on this number? YES    Call taken on 5/23/2019 at 8:39 AM by Rosaura Dow

## 2019-11-03 ENCOUNTER — HEALTH MAINTENANCE LETTER (OUTPATIENT)
Age: 69
End: 2019-11-03

## 2020-06-20 ENCOUNTER — MYC REFILL (OUTPATIENT)
Dept: UROLOGY | Facility: CLINIC | Age: 70
End: 2020-06-20

## 2020-06-20 ENCOUNTER — MYC MEDICAL ADVICE (OUTPATIENT)
Dept: UROLOGY | Facility: CLINIC | Age: 70
End: 2020-06-20

## 2020-06-20 DIAGNOSIS — R39.15 URINARY URGENCY: ICD-10-CM

## 2020-06-22 RX ORDER — MIRABEGRON 50 MG/1
50 TABLET, EXTENDED RELEASE ORAL DAILY
Qty: 90 TABLET | Refills: 3 | Status: SHIPPED | OUTPATIENT
Start: 2020-06-22 | End: 2021-06-04

## 2020-07-16 ENCOUNTER — MYC MEDICAL ADVICE (OUTPATIENT)
Dept: SLEEP MEDICINE | Facility: CLINIC | Age: 70
End: 2020-07-16

## 2020-07-28 NOTE — PROGRESS NOTES
"David Clark is a 69 year old male who is being evaluated via a billable telephone visit.      The patient has been notified of following:     \"This telephone visit will be conducted via a call between you and your physician/provider. We have found that certain health care needs can be provided without the need for a physical exam.  This service lets us provide the care you need with a short phone conversation.  If a prescription is necessary we can send it directly to your pharmacy.  If lab work is needed we can place an order for that and you can then stop by our lab to have the test done at a later time.    Telephone visits are billed at different rates depending on your insurance coverage. During this emergency period, for some insurers they may be billed the same as an in-person visit.  Please reach out to your insurance provider with any questions.    If during the course of the call the physician/provider feels a telephone visit is not appropriate, you will not be charged for this service.\"    Patient has given verbal consent for Telephone visit?  Yes    What phone number would you like to be contacted at? [phone]    How would you like to obtain your AVS? MyChart    Phone call duration: 9 minutes    Obstructive Sleep Apnea - PAP Follow-Up Visit:    Chief Complaint   Patient presents with     RECHECK     yamilka        David Clark comes in today for follow-up of their mild sleep apnea, managed with CPAP.     David Clark had a sleep study done on 7/11/2018 at the [facility] for memory concerns, snoring, witnessed apnea, daytime sleepiness, morning headaches, crowded oropharynx, large neck circumference and co-morbid DMII and HTN.    PSG date 7/11/2018-The combined apnea/hypopnea index was 8.9 events per hour (central apnea/hypopnea index was 2.0 events per hour). The REM AHI was 23.2 events per hour. The supine AHI was 10.7 events per hour. The RERA index was 4.3 events per hour. The RDI " was 13.2 events per hour.  Baseline oxygen saturation was 93.0%. Lowest oxygen saturation was 87.5%. Time spent less than or equal to 88% was 0.1 minutes. Time spent less than or equal to 89% was 0.9 minutes. Movement Activity: Frequent PLM s (index 90) with rare cortical arousals (index 5.4). Small number of REM epochs with phasic lack of atonia, but would not appear to fit criteria for REM-sleep behavior disorder.    Overall, the patient rates his experience with PAP as 10 (0 poor, 10 great). The mask is comfortable. The mask is not leaking.  He is not snoring with the mask on. He is not having gasp arousals. He is not having any oral or nasal dryness. The pressure settings feels low.    His PAP interface is Nasal Pillows.    Bedtime is typically 11 PM. Usually it takes about 5 minutes to fall asleep with the mask on. Wake time is typically 8-9.  Patient is using PAP therapy 8-9 hours per night. The patient is usually getting 8-9 hours of sleep per night.    He does feel rested in the morning.    Total score - [place]: 10 (7/28/2020 11:00 AM)      LISA Total Score: 2      ResMed   Auto-PAP 8.0 - 15.0 cmH2O 30 day usage data:    86% of days with > 4 hours of use. 0/30 days with no use.   Average use 513 minutes per day.   95%ile Leak 21.6 L/min.   CPAP 95% pressure 11 cm.   AHI 3.71 events per hour.     Past medical/surgical history, family history, social history, medications and allergies were reviewed.      Problem List:  Patient Active Problem List    Diagnosis Date Noted     Sensitivity to medication, initial encounter 03/11/2019     Priority: Medium     Hyperlipidemia LDL goal <130 09/14/2018     Priority: Medium     EDY (obstructive sleep apnea) 08/29/2018     Priority: Medium     PSG date 7/11/2018-The combined apnea/hypopnea index was 8.9 events per hour (central apnea/hypopnea index was 2.0 events per hour). The REM AHI was 23.2 events per hour. The supine AHI was 10.7 events per hour. The RERA index was  4.3 events per hour. The RDI was 13.2 events per hour.  Baseline oxygen saturation was 93.0%. Lowest oxygen saturation was 87.5%. Time spent less than or equal to 88% was 0.1 minutes. Time spent less than or equal to 89% was 0.9 minutes. Movement Activity: Frequent PLM s (index 90) with rare cortical arousals (index 5.4). Small number of REM epochs with phasic lack of atonia, but would not appear to fit criteria for REM-sleep behavior disorder.        Class 1 obesity due to excess calories with body mass index (BMI) of 31.0 to 31.9 in adult, unspecified whether serious comorbidity present 07/10/2018     Priority: Medium     Erectile dysfunction, unspecified erectile dysfunction type 07/29/2016     Priority: Medium     Prediabetes 07/29/2016     Priority: Medium     Advanced directives, counseling/discussion 05/29/2013     Priority: Medium     Patient does not have an Advance/Health Care Directive (HCD), requests blank HCD form.    Nazia Spence  May 29, 2013         Thyroid nodule 11/22/2010     Priority: Medium     Tinea versicolor 09/23/2010     Priority: Medium        There were no vitals taken for this visit.    Impression/Plan:    Mild Sleep apnea.   Tolerating PAP well. Daytime symptoms are improved.   Change to auto-CPAP 10-15 cm/H20  Comprehensive DME    David Clark will follow up in about 1 year or sooner if any concerns.    Melanie Marinelli PA-C

## 2020-07-29 ENCOUNTER — VIRTUAL VISIT (OUTPATIENT)
Dept: SLEEP MEDICINE | Facility: CLINIC | Age: 70
End: 2020-07-29
Payer: MEDICARE

## 2020-07-29 DIAGNOSIS — G47.33 OBSTRUCTIVE SLEEP APNEA: Primary | ICD-10-CM

## 2020-07-29 PROCEDURE — 99441 ZZC PHYSICIAN TELEPHONE EVALUATION 5-10 MIN: CPT | Performed by: PHYSICIAN ASSISTANT

## 2020-08-03 NOTE — NURSING NOTE
1 year reminder card will be mailed to pt. Cpap pressure changed from original settings of 8 Min - 15 Max to new settings of 10 Min - 15 Max.      CORTEZ Mueller

## 2020-08-05 NOTE — PROGRESS NOTES
ESTABLISHED PATIENT NEUROLOGY NOTE    DATE OF VISIT: 8/6/2020  CLINIC LOCATION: [facility]  MRN: [id number]  PATIENT NAME: David Clark  YOB: 1950    PCP: Steve Patterson MD    REASON FOR VISIT:   Chief Complaint   Patient presents with     Memory Loss     SUBJECTIVE:                                                      HISTORY OF PRESENT ILLNESS: Patient is here for a follow up regarding mild cognitive impairment.  The last visit was on 7/25/2019.  The follow-up was planned in 6 months, but the patient decided not to keep that appointment.  His wife reached me via My Chart stating that they will follow-up in 1 year.  Please refer to my initial/other prior notes for further information.  The patient is accompanied by his wife, who participates in the discussion today.    Since the last visit, the patient reports no significant changes in his memory.  His wife agrees, although notes cognitive fluctuations from day today.  She has no other concerns.  The patient wants to resume driving, but his wife strongly opposes this idea because she is concerned about his safety while driving.  No other concerns.  The patient denies interval development of new focal neurological symptoms.    On review of systems, patient endorses no other active complaints. Medications, allergies, family and social history were also reviewed. There are no changes reported by patient.  He and his wife remain healthy during this challenging time of COVID-19 pandemic.  REVIEW OF SYSTEMS:                                                    10-system review was completed. Pertinent positives are included in HPI. The remainder of ROS is negative.  EXAM:                                                    Physical Exam:   Vitals: /60 (BP Location: Left arm, Patient Position: Sitting, Cuff Size: Adult Regular)   Pulse 72   Temp 98.1  F (36.7  C) (Oral)   SpO2 98%   [place] Cognitive Assessment:    [place]  Cognitive Assessment (MOCA)  Visuospatial/Executive : 4  Naming: 3  Attention - Digits: 2  Attention - Letters: 0  Attention - Subtraction: 1  Language - Repeat: 1  Language - Fluency : 0  Abstraction: 2  Delayed Recall: 0  Orientation: 6  Education: 0  MOCA Score: 19     [place] Cognitive Assessment Score:  MOCA Score: 19/30.     General: pt is in NAD, cooperative.  Skin: normal turgor, moist mucous membranes, no lesions/rashes noticed.  HEENT: ATNC, white sclera, normal conjunctiva.  Respiratory: Symmetric lung excursion, no accessory respiratory muscle use.  Abdomen: Non distended.  Neurological: awake, cooperative, follows commands, MoCA as per above, no aphasia or dysarthria noted, cranial nerves II-XII: no ptosis, extraocular motility is full, face is symmetric, tongue is midline, equally moves all extremities, tone is normal even with provoking maneuvers bilaterally, no dysmetria bilaterally, casual gait is normal.  ASSESSMENT AND PLAN:                                                    Assessment: 70-year-old male patient with mild cognitive impairment presents for follow-up.  He reports that his cognition is stable.  His wife is in agreement.    We had a detailed discussion with the patient and his wife regarding his symptoms, cognitive testing results, and the plan.  His MoCA today is 19/30, compared to 22/30 in May 2019.  At the last visit we discussed that if his MoCA score significantly declines, we should repeat his neuropsychologic evaluation.  I do not think that current MoCA score worsening would merit the testing yet.  We might consider to repeat it in the future when decline is more significant.  We should repeat MoCA in 6 months    Lewy body dementia was listed in the differential of possibilities based on initial testing.  However, I do not see any extrapyramidal signs at the present time to suspect this diagnosis.  We will continue monitoring.    We also reviewed patient's desire to resume  driving and his wife concerns regarding his safety.  His prior CPT score was above 4.75, but was done more than a year ago.  I think we should proceed with behind the wheel driving evaluation to decide.    Diagnoses:    ICD-10-CM    1. Mild cognitive impairment  G31.84 OCCUPATIONAL THERAPY REFERRAL     Plan: At today's visit we thoroughly discussed current symptoms, cognitive testing results (mild decline), and the plan.  We will repeat a different version of cognitive test in 6 months.    I also advised him to complete formal behind the wheel driving evaluation with occupational therapy.  The information on how to arrange it was provided.    Next follow-up appointment is in the next 6 months or earlier if needed.    Total Time: 25 minutes with > 50% spent counseling the patient and his wife on stated above assessment and recommendations.    Albaro Victoria MD  HP/ Neurology

## 2020-08-05 NOTE — PATIENT INSTRUCTIONS
AFTER VISIT SUMMARY (AVS):    At today's visit we thoroughly discussed current symptoms, cognitive testing results (mild decline), and the plan.  We will repeat a different version of cognitive test in 6 months.    I would also advise you to complete formal behind the wheel driving evaluation with occupational therapy.  The information on how to arrange it was provided.    Next follow-up appointment is in the next 6 months or earlier if needed.    Please do not hesitate to call me with any questions or concerns.    Thanks.

## 2020-08-06 ENCOUNTER — OFFICE VISIT (OUTPATIENT)
Dept: NEUROLOGY | Facility: CLINIC | Age: 70
End: 2020-08-06
Payer: MEDICARE

## 2020-08-06 VITALS
TEMPERATURE: 98.1 F | HEART RATE: 72 BPM | OXYGEN SATURATION: 98 % | DIASTOLIC BLOOD PRESSURE: 60 MMHG | SYSTOLIC BLOOD PRESSURE: 122 MMHG

## 2020-08-06 DIAGNOSIS — G31.84 MILD COGNITIVE IMPAIRMENT: Primary | ICD-10-CM

## 2020-08-06 PROCEDURE — 99214 OFFICE O/P EST MOD 30 MIN: CPT | Performed by: PSYCHIATRY & NEUROLOGY

## 2020-08-06 ASSESSMENT — MONTREAL COGNITIVE ASSESSMENT (MOCA)
VISUOSPATIAL/EXECUTIVE SUBSCORE: 4
9. REPEAT EACH SENTENCE: 1
11. FOR EACH PAIR OF WORDS, WHAT CATEGORY DO THEY BELONG TO (OUT OF 2): 2
6. READ LIST OF DIGITS [FORWARD/BACKWARD]: 2
13. ORIENTATION SUBSCORE: 6
4. NAME EACH OF THE THREE ANIMALS SHOWN: 3
WHAT LEVEL OF EDUCATION WAS ATTAINED: 0
10. [FLUENCY] NAME WORDS STARTING WITH DESIGNATED LETTER: 0
8. SERIAL SUBTRACTION OF 7S: 1
12. MEMORY INDEX SCORE: 0
7. [VIGILENCE] TAP WHEN HEARING DESIGNATED LETTER: 0
WHAT IS THE TOTAL SCORE (OUT OF 30): 19

## 2020-08-06 NOTE — LETTER
8/6/2020         RE: David Clark  [address] [facility] [id number]        Dear Colleague,    Thank you for referring your patient, David Clark, to the [facility]. Please see a copy of my visit note below.    ESTABLISHED PATIENT NEUROLOGY NOTE    DATE OF VISIT: 8/6/2020  CLINIC LOCATION: [facility]  MRN: [id number]  PATIENT NAME: David Clark  YOB: 1950    PCP: Steve Patterson MD    REASON FOR VISIT:   Chief Complaint   Patient presents with     Memory Loss     SUBJECTIVE:                                                      HISTORY OF PRESENT ILLNESS: Patient is here for a follow up regarding mild cognitive impairment.  The last visit was on 7/25/2019.  The follow-up was planned in 6 months, but the patient decided not to keep that appointment.  His wife reached me via My Chart stating that they will follow-up in 1 year.  Please refer to my initial/other prior notes for further information.  The patient is accompanied by his wife, who participates in the discussion today.    Since the last visit, the patient reports no significant changes in his memory.  His wife agrees, although notes cognitive fluctuations from day today.  She has no other concerns.  The patient wants to resume driving, but his wife strongly opposes this idea because she is concerned about his safety while driving.  No other concerns.  The patient denies interval development of new focal neurological symptoms.    On review of systems, patient endorses no other active complaints. Medications, allergies, family and social history were also reviewed. There are no changes reported by patient.  He and his wife remain healthy during this challenging time of COVID-19 pandemic.  REVIEW OF SYSTEMS:                                                    10-system review was completed. Pertinent positives are included in HPI. The remainder of ROS is negative.  EXAM:                                                     Physical Exam:   Vitals: /60 (BP Location: Left arm, Patient Position: Sitting, Cuff Size: Adult Regular)   Pulse 72   Temp 98.1  F (36.7  C) (Oral)   SpO2 98%   [place] Cognitive Assessment:    David Cognitive Assessment (MOCA)  Visuospatial/Executive : 4  Naming: 3  Attention - Digits: 2  Attention - Letters: 0  Attention - Subtraction: 1  Language - Repeat: 1  Language - Fluency : 0  Abstraction: 2  Delayed Recall: 0  Orientation: 6  Education: 0  MOCA Score: 19     [place] Cognitive Assessment Score:  MOCA Score: 19/30.     General: pt is in NAD, cooperative.  Skin: normal turgor, moist mucous membranes, no lesions/rashes noticed.  HEENT: ATNC, white sclera, normal conjunctiva.  Respiratory: Symmetric lung excursion, no accessory respiratory muscle use.  Abdomen: Non distended.  Neurological: awake, cooperative, follows commands, MoCA as per above, no aphasia or dysarthria noted, cranial nerves II-XII: no ptosis, extraocular motility is full, face is symmetric, tongue is midline, equally moves all extremities, tone is normal even with provoking maneuvers bilaterally, no dysmetria bilaterally, casual gait is normal.  ASSESSMENT AND PLAN:                                                    Assessment: 70-year-old male patient with mild cognitive impairment presents for follow-up.  He reports that his cognition is stable.  His wife is in agreement.    We had a detailed discussion with the patient and his wife regarding his symptoms, cognitive testing results, and the plan.  His MoCA today is 19/30, compared to 22/30 in May 2019.  At the last visit we discussed that if his MoCA score significantly declines, we should repeat his neuropsychologic evaluation.  I do not think that current MoCA score worsening would merit the testing yet.  We might consider to repeat it in the future when decline is more significant.  We should repeat MoCA in 6 months    Lewy body  dementia was listed in the differential of possibilities based on initial testing.  However, I do not see any extrapyramidal signs at the present time to suspect this diagnosis.  We will continue monitoring.    We also reviewed patient's desire to resume driving and his wife concerns regarding his safety.  His prior CPT score was above 4.75, but was done more than a year ago.  I think we should proceed with behind the wheel driving evaluation to decide.    Diagnoses:    ICD-10-CM    1. Mild cognitive impairment  G31.84 OCCUPATIONAL THERAPY REFERRAL     Plan: At today's visit we thoroughly discussed current symptoms, cognitive testing results (mild decline), and the plan.  We will repeat a different version of cognitive test in 6 months.    I also advised him to complete formal behind the wheel driving evaluation with occupational therapy.  The information on how to arrange it was provided.    Next follow-up appointment is in the next 6 months or earlier if needed.    Total Time: 25 minutes with > 50% spent counseling the patient and his wife on stated above assessment and recommendations.    Albaro Victoria MD  / Neurology      Again, thank you for allowing me to participate in the care of your patient.        Sincerely,        Albaro Victoria MD

## 2020-09-23 ENCOUNTER — OFFICE VISIT (OUTPATIENT)
Dept: FAMILY MEDICINE | Facility: CLINIC | Age: 70
End: 2020-09-23
Payer: MEDICARE

## 2020-09-23 VITALS
OXYGEN SATURATION: 95 % | DIASTOLIC BLOOD PRESSURE: 68 MMHG | WEIGHT: 217 LBS | TEMPERATURE: 97.7 F | HEART RATE: 61 BPM | BODY MASS INDEX: 32.14 KG/M2 | SYSTOLIC BLOOD PRESSURE: 106 MMHG | HEIGHT: 69 IN

## 2020-09-23 DIAGNOSIS — F03.90 DEMENTIA WITHOUT BEHAVIORAL DISTURBANCE, UNSPECIFIED DEMENTIA TYPE: ICD-10-CM

## 2020-09-23 DIAGNOSIS — Z23 ENCOUNTER FOR IMMUNIZATION: ICD-10-CM

## 2020-09-23 DIAGNOSIS — Z23 NEED FOR PROPHYLACTIC VACCINATION AND INOCULATION AGAINST INFLUENZA: ICD-10-CM

## 2020-09-23 DIAGNOSIS — E78.5 HYPERLIPIDEMIA LDL GOAL <130: ICD-10-CM

## 2020-09-23 DIAGNOSIS — R45.86 MOOD CHANGES: ICD-10-CM

## 2020-09-23 DIAGNOSIS — Z00.00 ROUTINE HISTORY AND PHYSICAL EXAMINATION OF ADULT: Primary | ICD-10-CM

## 2020-09-23 LAB
CHOLEST SERPL-MCNC: 244 MG/DL
HDLC SERPL-MCNC: 45 MG/DL
LDLC SERPL CALC-MCNC: 163 MG/DL
NONHDLC SERPL-MCNC: 199 MG/DL
TRIGL SERPL-MCNC: 180 MG/DL

## 2020-09-23 PROCEDURE — 90471 IMMUNIZATION ADMIN: CPT | Performed by: FAMILY MEDICINE

## 2020-09-23 PROCEDURE — 80061 LIPID PANEL: CPT | Performed by: FAMILY MEDICINE

## 2020-09-23 PROCEDURE — 36415 COLL VENOUS BLD VENIPUNCTURE: CPT | Performed by: FAMILY MEDICINE

## 2020-09-23 PROCEDURE — G0008 ADMIN INFLUENZA VIRUS VAC: HCPCS | Mod: 59 | Performed by: FAMILY MEDICINE

## 2020-09-23 PROCEDURE — 90714 TD VACC NO PRESV 7 YRS+ IM: CPT | Performed by: FAMILY MEDICINE

## 2020-09-23 PROCEDURE — 90662 IIV NO PRSV INCREASED AG IM: CPT | Performed by: FAMILY MEDICINE

## 2020-09-23 PROCEDURE — G0439 PPPS, SUBSEQ VISIT: HCPCS | Performed by: FAMILY MEDICINE

## 2020-09-23 PROCEDURE — 99213 OFFICE O/P EST LOW 20 MIN: CPT | Mod: 25 | Performed by: FAMILY MEDICINE

## 2020-09-23 RX ORDER — CITALOPRAM HYDROBROMIDE 10 MG/1
TABLET ORAL
Qty: 270 TABLET | Refills: 3 | Status: SHIPPED | OUTPATIENT
Start: 2020-09-23 | End: 2021-08-03

## 2020-09-23 ASSESSMENT — ANXIETY QUESTIONNAIRES
3. WORRYING TOO MUCH ABOUT DIFFERENT THINGS: MORE THAN HALF THE DAYS
GAD7 TOTAL SCORE: 12
2. NOT BEING ABLE TO STOP OR CONTROL WORRYING: SEVERAL DAYS
5. BEING SO RESTLESS THAT IT IS HARD TO SIT STILL: SEVERAL DAYS
7. FEELING AFRAID AS IF SOMETHING AWFUL MIGHT HAPPEN: NEARLY EVERY DAY
1. FEELING NERVOUS, ANXIOUS, OR ON EDGE: MORE THAN HALF THE DAYS
6. BECOMING EASILY ANNOYED OR IRRITABLE: MORE THAN HALF THE DAYS

## 2020-09-23 ASSESSMENT — ACTIVITIES OF DAILY LIVING (ADL)
CURRENT_FUNCTION: MEDICATION ADMINISTRATION REQUIRES ASSISTANCE
CURRENT_FUNCTION: TRANSPORTATION REQUIRES ASSISTANCE
CURRENT_FUNCTION: MONEY MANAGEMENT REQUIRES ASSISTANCE

## 2020-09-23 ASSESSMENT — PATIENT HEALTH QUESTIONNAIRE - PHQ9
5. POOR APPETITE OR OVEREATING: SEVERAL DAYS
SUM OF ALL RESPONSES TO PHQ QUESTIONS 1-9: 14

## 2020-09-23 ASSESSMENT — MIFFLIN-ST. JEOR: SCORE: 1743.65

## 2020-09-23 NOTE — PROGRESS NOTES
"SUBJECTIVE:   David Clark is a 70 year old male who presents for Preventive Visit.      Patient has been advised of split billing requirements and indicates understanding: Yes   Are you in the first 12 months of your Medicare coverage?  No    Healthy Habits:    In general, how would you rate your overall health?  Very good    Frequency of exercise:  6-7 days/week    Duration of exercise:  15-30 minutes    Do you usually eat at least 4 servings of fruit and vegetables a day, include whole grains    & fiber and avoid regularly eating high fat or \"junk\" foods?  Yes    Taking medications regularly:  Yes    Barriers to taking medications:  None    Medication side effects:  None    Ability to successfully perform activities of daily living:  Transportation requires assistance, medication administration requires assistance and money management requires assistance    Home Safety:  No safety concerns identified    Hearing Impairment:  No hearing concerns    In the past 6 months, have you been bothered by leaking of urine?  No    In general, how would you rate your overall mental or emotional health?  Very good      PHQ-2 Total Score:    Do you feel safe in your environment? Yes    Have you ever done Advance Care Planning? (For example, a Health Directive, POLST, or a discussion with a medical provider or your loved ones about your wishes): Yes, patient states has an Advance Care Planning document and will bring a copy to the clinic.  Fall risk  Fallen 2 or more times in the past year?: Yes(Feels this could be related to his glasses-trying to wear different glasses.  Has fallen out of bed.)  Any fall with injury in the past year?: No  Timed Up and Go Test (>13.5 is fall risk; contact physician) : 9  Cognitive Screening:  This was not done due to the diagnosis in his chart of mild cognitive impairment.  See appointment with Neurology and follow up.   Do you have sleep apnea, excessive snoring or daytime drowsiness?: Sleep " apnea-he uses a CPAP machine.  Reviewed and updated as needed this visit by clinical staff  Tobacco  Allergies  Meds  Med Hx  Surg Hx  Fam Hx  Soc Hx    Reviewed and updated as needed this visit by Provider        Social History     Tobacco Use     Smoking status: Never Smoker     Smokeless tobacco: Never Used   Substance Use Topics     Alcohol use: Yes     Comment: Beer-occ.     If you drink alcohol do you typically have >3 drinks per day or >7 drinks per week? No    Alcohol Use 9/23/2020   Prescreen: >3 drinks/day or >7 drinks/week? No           Chief Complaint   Patient presents with     Wellness Visit     Wellness physical exam.     MOOD CHANGES     Recheck on medication.     Imm/Inj     Due to update his Td injection.     Flu Shot     Flu shot today.     Driving     Will make a copy of the letter that states he is no longer safe to operate a motor vehicle.     Imm/Inj     Flu Shot         Current providers sharing in care for this patient include:   Patient Care Team:  Steve Patterson MD as PCP - General (Family Practice)  Steve Patterson MD as Assigned PCP    The following health maintenance items are reviewed in Epic and correct as of today:  Health Maintenance   Topic Date Due     HEPATITIS C SCREENING  1950     ZOSTER IMMUNIZATION (1 of 2) 07/31/2001     AORTIC ANEURYSM SCREENING (SYSTEM ASSIGNED)  07/31/2016     ADVANCE CARE PLANNING  05/29/2018     PHQ-2  01/01/2020     INFLUENZA VACCINE (1) 09/01/2020     FALL RISK ASSESSMENT  09/16/2020     MEDICARE ANNUAL WELLNESS VISIT  09/16/2020     DTAP/TDAP/TD IMMUNIZATION (3 - Td) 09/23/2020     LIPID  09/16/2024     COLORECTAL CANCER SCREENING  08/16/2026     PNEUMOCOCCAL IMMUNIZATION 65+ LOW/MEDIUM RISK  Completed     IPV IMMUNIZATION  Aged Out     MENINGITIS IMMUNIZATION  Aged Out     HEPATITIS B IMMUNIZATION  Aged Out     Current Outpatient Medications   Medication Instructions     citalopram (CELEXA) 10 MG tablet Take 30 mg, or three  "of the 10 mg pills, daily     econazole nitrate 1 % cream Topical, 2 TIMES DAILY     fish oil-omega-3 fatty acids (FISH OIL) 1000 MG capsule 2-4 capsules, DAILY     Glucosamine-Chondroit-Vit C-Mn (GLUCOSAMINE 1500 COMPLEX) CAPS Take  by mouth.     ketoconazole (NIZORAL) 2 % shampoo Apply to the affected area and wash off after 5 minutes daily for first 2 weeks.     mirabegron (MYRBETRIQ) 50 mg, Oral, DAILY     Multiple Vitamin (MULTI-VITAMIN) per tablet 1 tablet, DAILY     sildenafil (VIAGRA)  mg, Oral, DAILY PRN, Take 30 min to 4 hours before intercourse.       Patient Active Problem List   Diagnosis     Tinea versicolor     Thyroid nodule     Advanced directives, counseling/discussion     Erectile dysfunction, unspecified erectile dysfunction type     Prediabetes     Class 1 obesity due to excess calories with body mass index (BMI) of 31.0 to 31.9 in adult, unspecified whether serious comorbidity present     EDY (obstructive sleep apnea)     Hyperlipidemia LDL goal <130     Sensitivity to medication, initial encounter       Review of Systems  See above.     OBJECTIVE:   /68   Pulse 61   Temp 97.7  F (36.5  C) (Tympanic)   Ht 1.759 m (5' 9.25\")   Wt 98.4 kg (217 lb)   SpO2 95%   BMI 31.81 kg/m   Estimated body mass index is 31.81 kg/m  as calculated from the following:    Height as of this encounter: 1.759 m (5' 9.25\").    Weight as of this encounter: 98.4 kg (217 lb).  Physical Exam  Exam:  GENERAL APPEARANCE: healthy, alert and no distress  EYES: EOMI,  PERRL  HENT: ear canals and TM's normal and nose and mouth without ulcers or lesions  NECK: no adenopathy, no asymmetry, masses, or scars and thyroid normal to palpation  RESP: lungs clear to auscultation - no rales, rhonchi or wheezes  CV: regular rates and rhythm, normal S1 S2, no S3 or S4 and no murmur, click or rub -  ABDOMEN:  soft, nontender, no HSM or masses and bowel sounds normal  GU_male: testicles normal without atrophy or masses, no " "hernias and penis normal without urethral discharge  MS: extremities normal- no gross deformities noted, no evidence of inflammation in joints, FROM in all extremities.  SKIN: no suspicious lesions or rashes  NEURO: Normal strength and tone, sensory exam grossly normal, mentation intact and speech normal  LYMPHATICS: No axillary, cervical, inguinal, or supraclavicular nodes    ASSESSMENT / PLAN:       Routine history and physical examination of adult  COUNSELING:  Reviewed preventive health counseling, as reflected in patient instructions       Regular exercise       Healthy diet/nutrition       Vision screening       Hearing screening       Dental care    Estimated body mass index is 31.81 kg/m  as calculated from the following:    Height as of this encounter: 1.759 m (5' 9.25\").    Weight as of this encounter: 98.4 kg (217 lb).  He reports that he has never smoked. He has never used smokeless tobacco.  Appropriate preventive services were discussed with this patient, including applicable screening as appropriate for cardiovascular disease, diabetes, osteopenia/osteoporosis, and glaucoma.  As appropriate for age/gender, discussed screening for colorectal cancer, prostate cancer, breast cancer, and cervical cancer. Checklist reviewing preventive services available has been given to the patient.    Reviewed patients plan of care and provided an AVS. The Basic Care Plan (routine screening as documented in Health Maintenance) for David meets the Care Plan requirement. This Care Plan has been established and reviewed with the Patient.    Counseling Resources:  ATP IV Guidelines  Pooled Cohorts Equation Calculator  Breast Cancer Risk Calculator  Breast Cancer: Medication to Reduce Risk  FRAX Risk Assessment  ICSI Preventive Guidelines  Dietary Guidelines for Americans, 2010  USDA's MyPlate  ASA Prophylaxis  Lung CA Screening    . Mood changes  For the mood changes, we discussed the issues and since the PHQ is 14, and the " NOLAN is 12, we will increase the Celexa from 20 mg to 30 mg daily now.   Call if any side effects. This will take several weeks for max effect. Use the non drug therapies. Exercise daily. If doing well then refills are done for one year.   - citalopram (CELEXA) 10 MG tablet; Take 30 mg, or three of the 10 mg pills, daily  Dispense: 270 tablet; Refill: 3    Hyperlipidemia LDL goal <130  Do the fasting lipids today. We will call the results. Use the lower chol diet and daily exercise.   - Lipid panel reflex to direct LDL Fasting    (F03.90) Dementia without behavioral disturbance, unspecified dementia type (H)  Comment:   Plan: he was here with his wife today. We discussed the recent driving evaluation which stated it was not safe for him to drive.   The family understands this and have made arrangements for his mobility. He is followed with Neurology.         Steve Patterson MD  [facility]    Identified Health Risks:

## 2020-09-23 NOTE — PATIENT INSTRUCTIONS
"      Thank you for choosing [facility].  You may be receiving an email and/or telephone survey request from [facility] Customer Experience regarding your visit today.  Please take a few minutes to respond to the survey to let us know how we are doing.      If you have questions or concerns, please contact us via [employer] or you can contact your care team at [phone].    Our Clinic hours are:  Monday 6:40 am  to 7:00 pm  Tuesday -Friday 6:40 am to 5:00 pm    The [place] outpatient lab hours are:  Monday - Friday 6:10 am to 4:45 pm  Saturdays 7:00 am to 11:00 am  Appointments are required, call [phone]    If you have clinical questions after hours or would like to schedule an appointment,  call the clinic at [phone].    ASSESSMENT / PLAN:       Routine history and physical examination of adult  COUNSELING:  Reviewed preventive health counseling, as reflected in patient instructions       Regular exercise       Healthy diet/nutrition       Vision screening       Hearing screening       Dental care    Estimated body mass index is 31.81 kg/m  as calculated from the following:    Height as of this encounter: 1.759 m (5' 9.25\").    Weight as of this encounter: 98.4 kg (217 lb).  He reports that he has never smoked. He has never used smokeless tobacco.  Appropriate preventive services were discussed with this patient, including applicable screening as appropriate for cardiovascular disease, diabetes, osteopenia/osteoporosis, and glaucoma.  As appropriate for age/gender, discussed screening for colorectal cancer, prostate cancer, breast cancer, and cervical cancer. Checklist reviewing preventive services available has been given to the patient.    Reviewed patients plan of care and provided an AVS. The Basic Care Plan (routine screening as documented in Health Maintenance) for David meets the Care Plan requirement. This Care Plan has been established and reviewed with the Patient.    Counseling " Resources:  ATP IV Guidelines  Pooled Cohorts Equation Calculator  Breast Cancer Risk Calculator  Breast Cancer: Medication to Reduce Risk  FRAX Risk Assessment  ICSI Preventive Guidelines  Dietary Guidelines for Americans, 2010  USDA's MyPlate  ASA Prophylaxis  Lung CA Screening    . Mood changes  For the mood changes, we discussed the issues and since the PHQ is 14, and the NOLAN is 12, we will increase the Celexa from 20 mg to 30 mg daily now.   Call if any side effects. This will take several weeks for max effect. Use the non drug therapies. Exercise daily. If doing well then refills are done for one year.   - citalopram (CELEXA) 10 MG tablet; Take 30 mg, or three of the 10 mg pills, daily  Dispense: 270 tablet; Refill: 3    Hyperlipidemia LDL goal <130  Do the fasting lipids today. We will call the results. Use the lower chol diet and daily exercise.   - Lipid panel reflex to direct LDL Fasting    Patient Education   Personalized Prevention Plan  You are due for the preventive services outlined below.  Your care team is available to assist you in scheduling these services.  If you have already completed any of these items, please share that information with your care team to update in your medical record.  Health Maintenance Due   Topic Date Due     Hepatitis C Screening  1950     Zoster (Shingles) Vaccine (1 of 2) 07/31/2001     AORTIC ANEURYSM SCREENING (SYSTEM ASSIGNED)  07/31/2016     Discuss Advance Care Planning  05/29/2018     PHQ-2  01/01/2020     Flu Vaccine (1) 09/01/2020     FALL RISK ASSESSMENT  09/16/2020     Annual Wellness Visit  09/16/2020     Diptheria Tetanus Pertussis (DTAP/TDAP/TD) Vaccine (3 - Td) 09/23/2020     Activities of Daily Living    Your Health Risk Assessment indicates you have difficulties with activities of daily living such as housework, bathing, preparing meals, taking medication, etc. Please make a follow up appointment for us to address this issue in more  detail.    Preventing Falls in the Home  An adult or child can fall for many reasons. If you are an older adult, you may fall because your reaction time slows down. Your muscles and joints may get stiff, weak, or less flexible because of illness, medicines, or a physical condition. These things can also make a child more likely to fall or be injured in a fall.  Other health problems that make falls more likely include:    Arthritis    Dizziness or lightheadedness when you get out of bed (orthostatic hypotension)    History of a stroke    Dizziness    Anemia    Certain medicines taken for mental illness or to control blood pressure.    Problems with balance or gait    Bladder or urinary problems    History of falls with or without an injury    Changes in vision (vision impairment)    Changes in thinking skills and memory (cognitive impairment)  Injuries from a fall can include broken bones, dislocated joints, internal bleeding and cuts. When these injuries are serious enough, they can make it impossible for you or a child who is injured in a fall to live on his or her ownhome.  Prevention tips  To help prevent falls and fall-related injuries, follow the tips below.   Floors  Make floors safer by doing the following:     Put nonskid pads under area rugs.    Remove throw rugs.    Replace worn floor coverings.    Tack carpets firmly to each step on carpeted stairs. Put nonskid strips on the edges of uncarpeted stairs.    Keep floors and stairs free of clutter and cords.    Arrange furniture so there are clear pathways.    Clean up any spills right away.    Wear shoes that fit.  Bathrooms    Make bathrooms safer by doing the following:     Install grab bars in the tub or shower.    Apply nonskid strips or put a nonskid rubber mat in the tub or shower.    Sit on a bath chair to bathe.    Use bathmats with nonskid backing.  Lighting and the environment  Improve lighting in your home by doing the following:     Keep a  flashlight in each room. Or put a lamp next to the bed within easy reach.    Put nightlights in the bedrooms, hallways, kitchen, and bathrooms.    Make sure all stairways have good lighting.    Take your time when going up and down stairs.    Put handrails on both sides of stairs and in walkways for more support. To prevent injury to your wrist or arm, don t use handrails to pull yourself up.    Install grab bars to pull yourself up.    Move or rearrange items that you use often. This will make them easier to find or reach.    Look at your home to find any safety hazards. Especially look at doorways, walkways, and the driveway. Remove or repair any safety problems that you find.  Date Last Reviewed: 8/1/2016 2000-2019 [employer]. [address] [place] [address], [place] [address]. All rights reserved. This information is not intended as a substitute for professional medical care. Always follow your healthcare professional's instructions.

## 2020-09-23 NOTE — PROGRESS NOTES
The patient reports that he has difficulty with activities of daily living. I have asked that the patient make a follow up appointment in  weeks where this issue will be further evaluated and addressed.  He is at risk for falling and has been provided with information to reduce the risk of falling at home.

## 2020-09-23 NOTE — PROGRESS NOTES
Prior to immunization administration, verified patients identity using patient s name and date of birth. Please see Immunization Activity for additional information.     Screening Questionnaire for Adult Immunization    Are you sick today?   No   Do you have allergies to medications, food, a vaccine component or latex?   No   Have you ever had a serious reaction after receiving a vaccination?   No   Do you have a long-term health problem with heart, lung, kidney, or metabolic disease (e.g., diabetes), asthma, a blood disorder, no spleen, complement component deficiency, a cochlear implant, or a spinal fluid leak?  Are you on long-term aspirin therapy?   No   Do you have cancer, leukemia, HIV/AIDS, or any other immune system problem?   No   Do you have a parent, brother, or sister with an immune system problem?   No   In the past 3 months, have you taken medications that affect  your immune system, such as prednisone, other steroids, or anticancer drugs; drugs for the treatment of rheumatoid arthritis, Crohn s disease, or psoriasis; or have you had radiation treatments?   No   Have you had a seizure, or a brain or other nervous system problem?   No   During the past year, have you received a transfusion of blood or blood    products, or been given immune (gamma) globulin or antiviral drug?   No   For women: Are you pregnant or is there a chance you could become       pregnant during the next month?   No   Have you received any vaccinations in the past 4 weeks?   No     Immunization questionnaire answers were all negative.        Per orders of Dr. Patterson, injection of Td and Flu given by Aura Santana CMA. Patient instructed to remain in clinic for 15 minutes afterwards, and to report any adverse reaction to me immediately.       Screening performed by Aura Santana CMA on 9/23/2020 at 12:07 PM.

## 2020-09-24 ASSESSMENT — ANXIETY QUESTIONNAIRES: GAD7 TOTAL SCORE: 12

## 2020-11-09 DIAGNOSIS — E78.5 HYPERLIPIDEMIA LDL GOAL <130: Primary | ICD-10-CM

## 2020-11-09 DIAGNOSIS — E78.5 HYPERLIPIDEMIA LDL GOAL <130: ICD-10-CM

## 2020-11-09 RX ORDER — SIMVASTATIN 10 MG
10 TABLET ORAL AT BEDTIME
Qty: 30 TABLET | Refills: 0 | Status: SHIPPED | OUTPATIENT
Start: 2020-11-09 | End: 2020-11-11

## 2020-11-09 NOTE — TELEPHONE ENCOUNTER
"Requested Prescriptions   Pending Prescriptions Disp Refills     simvastatin (ZOCOR) 10 MG tablet [Pharmacy Med Name: SIMVASTATIN 10MG TABLETS] 90 tablet      Sig: TAKE 1 TABLET(10 MG) BY MOUTH AT BEDTIME       Statins Protocol Passed - 11/9/2020 11:13 AM        Passed - LDL on file in past 12 months     Recent Labs   Lab Test 09/23/20  1203   *             Passed - No abnormal creatine kinase in past 12 months     No lab results found.         Passed - Recent (12 mo) or future (30 days) visit within the authorizing provider's specialty     Patient has had an office visit with the authorizing provider or a provider within the authorizing providers department within the previous 12 mos or has a future within next 30 days. See \"Patient Info\" tab in inbasket, or \"Choose Columns\" in Meds & Orders section of the refill encounter.              Passed - Medication is active on med list        Passed - Patient is age 18 or older             "

## 2020-11-11 RX ORDER — SIMVASTATIN 10 MG
TABLET ORAL
Qty: 90 TABLET | Refills: 1 | Status: SHIPPED | OUTPATIENT
Start: 2020-11-11

## 2020-11-11 NOTE — TELEPHONE ENCOUNTER
Routing refill request to provider for review/approval because:  Drug interaction warning    Elina Carter RN

## 2021-04-20 NOTE — PROGRESS NOTES
SUBJECTIVE:   David Clark is a 68 year old male who presents to clinic today for the following health issues:      Concern - Patient had a reaction to tamsulosin  Onset: Patient started medication about 2-11-19  patient stopped medication 3-1-19 due to side effects     Description:   Patient was on medication for about 2 week and  after started with   side affects to the medication depression, fatigue and arm tremors.     Intensity:     Progression of Symptoms:  Improving being off medication     Accompanying Signs & Symptoms:  Fatigue, depression sx, arm  tremors      Previous history of similar problem:   no    Precipitating factors:   Worsened by: medication brought on fatigue     Alleviating factors:  Improved by: when patient stopped medication and continues to get better    Therapies Tried and outcome: Patient stats that the medication did help with urine problem but had such bad side affects that he had to quit     Lipitor and Flomax were not tolerated. And he may have an enzyme deficiency.     Current Outpatient Medications:      fish oil-omega-3 fatty acids (FISH OIL) 1000 MG capsule, Take 2-4 capsules by mouth daily., Disp: , Rfl:      Glucosamine-Chondroit-Vit C-Mn (GLUCOSAMINE 1500 COMPLEX) CAPS, Take  by mouth., Disp: , Rfl:      Multiple Vitamin (MULTI-VITAMIN) per tablet, Take 1 tablet by mouth daily., Disp: , Rfl:      terazosin (HYTRIN) 1 MG capsule, Take one pill every other night for 2 weeks, and then one daily at bedtime, if needed. ., Disp: 30 capsule, Rfl: 11     econazole nitrate 1 % cream, Apply topically 2 times daily, Disp: 60 g, Rfl: 3     ketoconazole (NIZORAL) 2 % shampoo, Apply to the affected area and wash off after 5 minutes daily for first 2 weeks., Disp: 240 mL, Rfl: 11     sildenafil (VIAGRA) 100 MG tablet, Take 0.5-1 tablets ( mg) by mouth daily as needed Take 30 min to 4 hours before intercourse., Disp: 6 tablet, Rfl: 11    Patient Active Problem List   Diagnosis      "Tinea versicolor     Thyroid nodule     Advanced directives, counseling/discussion     Erectile dysfunction, unspecified erectile dysfunction type     Prediabetes     Class 1 obesity due to excess calories with body mass index (BMI) of 31.0 to 31.9 in adult, unspecified whether serious comorbidity present     EDY (obstructive sleep apnea)     Hyperlipidemia LDL goal <130     Sensitivity to medication, initial encounter       Blood pressure 124/72, pulse 69, temperature 97.8  F (36.6  C), temperature source Tympanic, resp. rate 14, height 1.772 m (5' 9.75\"), weight 95.7 kg (211 lb), SpO2 94 %.    Exam:  GENERAL APPEARANCE: healthy, alert and no distress  EYES: EOMI,  PERRL  ABDOMEN:  soft, nontender, no HSM or masses and bowel sounds normal  PSYCH: mentation appears normal and affect normal/bright      (T78.40XA) Sensitivity to medication, initial encounter  (primary encounter diagnosis)  Comment: Lipitor and Flomax. may have an enzyme deficiency.   Plan: terazosin (HYTRIN) 1 MG capsule        We discussed the likely side effects of tremor and possibly mood changes of the Flomax. All the symptoms.  We discussed the options for medications and will use Hytrin at 1 mg every other night about 30 minutes before bed.   If needed, the dose could be increased to 1 mg every night 2 weeks later. This will take several weeks for the bladder and prostate to improve,   And take several months for max effect.       Steve Patterson        " Hydroxyzine Pregnancy And Lactation Text: This medication is not safe during pregnancy and should not be taken. It is also excreted in breast milk and breast feeding isn't recommended.

## 2021-05-27 ENCOUNTER — OFFICE VISIT (OUTPATIENT)
Dept: NEUROLOGY | Facility: CLINIC | Age: 71
End: 2021-05-27
Attending: PSYCHIATRY & NEUROLOGY
Payer: MEDICARE

## 2021-05-27 VITALS
HEIGHT: 70 IN | DIASTOLIC BLOOD PRESSURE: 74 MMHG | OXYGEN SATURATION: 100 % | HEART RATE: 62 BPM | BODY MASS INDEX: 31.5 KG/M2 | WEIGHT: 220 LBS | SYSTOLIC BLOOD PRESSURE: 141 MMHG

## 2021-05-27 DIAGNOSIS — F03.91 DEMENTIA WITH BEHAVIORAL DISTURBANCE, UNSPECIFIED DEMENTIA TYPE: Primary | ICD-10-CM

## 2021-05-27 PROCEDURE — 99417 PROLNG OP E/M EACH 15 MIN: CPT | Performed by: PSYCHIATRY & NEUROLOGY

## 2021-05-27 PROCEDURE — G0463 HOSPITAL OUTPT CLINIC VISIT: HCPCS

## 2021-05-27 PROCEDURE — 99215 OFFICE O/P EST HI 40 MIN: CPT | Performed by: PSYCHIATRY & NEUROLOGY

## 2021-05-27 RX ORDER — RIVASTIGMINE TARTRATE 1.5 MG/1
CAPSULE ORAL
Qty: 182 CAPSULE | Refills: 1 | Status: SHIPPED | OUTPATIENT
Start: 2021-05-27 | End: 2021-10-01

## 2021-05-27 ASSESSMENT — MONTREAL COGNITIVE ASSESSMENT (MOCA)
13. ORIENTATION SUBSCORE: 3
10. [FLUENCY] NAME WORDS STARTING WITH DESIGNATED LETTER: 0
11. FOR EACH PAIR OF WORDS, WHAT CATEGORY DO THEY BELONG TO (OUT OF 2): 1
WHAT IS THE TOTAL SCORE (OUT OF 30): 11
6. READ LIST OF DIGITS [FORWARD/BACKWARD]: 1
12. MEMORY INDEX SCORE: 2
VISUOSPATIAL/EXECUTIVE SUBSCORE: 0
4. NAME EACH OF THE THREE ANIMALS SHOWN: 3
WHAT LEVEL OF EDUCATION WAS ATTAINED: 0
9. REPEAT EACH SENTENCE: 0
7. [VIGILENCE] TAP WHEN HEARING DESIGNATED LETTER: 1
8. SERIAL SUBTRACTION OF 7S: 0

## 2021-05-27 ASSESSMENT — MIFFLIN-ST. JEOR
SCORE: 1761.22
SCORE: 1761.22

## 2021-05-27 NOTE — LETTER
5/27/2021         RE: David Clark  [address] [facility] [id number]        Dear Colleague,    Thank you for referring your patient, David Clark, to the [facility] [place]. Please see a copy of my visit note below.    ESTABLISHED PATIENT NEUROLOGY NOTE    DATE OF VISIT: 5/27/2021  CLINIC LOCATION: [facility]  MRN: [id number]  PATIENT NAME: David Clark  YOB: 1950    PCP: Steve Patterson MD    REASON FOR VISIT:   Chief Complaint   Patient presents with     Memory Loss     MOCA 7.3      SUBJECTIVE:                                                      HISTORY OF PRESENT ILLNESS: Patient is here to follow up regarding mild cognitive impairment.  Last seen on 8/6/2020.  The follow-up visit was planned in 6 months, but the patient did not come until now.  Please refer to my initial/other prior notes for further information.  The patient is accompanied by his wife, who participates in the interview today.    Since the last visit, the patient reports he is doing relatively well, though admits that his long-term memory is better than short-term memory.  He feels that he sleeps okay.  He exercises regularly.  He does not believe that he has any additional medical issues.  He denies interval development of new focal neurological symptoms.    At the last visit we decided to do behind the wheel driving evaluation due to patient's desire to drive.  He had it on 9/8/2020 and did not pass (felt no longer safe to operate a motor vehicle).  He did not return to driving.    Patient's wife reached out to me via My Chart in March reporting that they moved his appointment from April to May because they were in [place] until that time.  She reported worsening hallucinations, motor skills, and daily confusion.  Then, in May (5/18) she reached out again reporting that he believes in recovery from his illness and feels that eventually he will be able  "to return to driving.    Today, patient's wife reports continued cognitive decline.  He is not able to put his CPAP mask properly any longer.  He reports frequent visual hallucinations (different animals).  He might confabulate if he does not remember the information.  He is also acting out in sleep (yells and kicks) that prompted her to sleep in a separate bedroom.  They tried 12 mg of melatonin, but this was not effective.  He is very argumentative.  She also noticed that he has difficulty with buttoning/dressing, and his dexterity is bilaterally affected.  She has no additional concerns.    On review of systems, patient endorses any additional active complaints. Medications, allergies, family and social history were also reviewed. There are no changes reported by patient.  REVIEW OF SYSTEMS:                                                    10-system review was completed. Pertinent positives are included in HPI. The remainder of ROS is negative.  EXAM:                                                    Physical Exam:   Vitals: BP (!) 141/74   Pulse 62   Ht 1.765 m (5' 9.5\")   Wt 99.8 kg (220 lb)   SpO2 100%   BMI 32.02 kg/m    [place] Cognitive Assessment:    David Cognitive Assessment (MOCA)  Visuospatial/Executive : 0  Naming: 3  Attention - Digits: 1  Attention - Letters: 1  Attention - Subtraction: 0  Language - Repeat: 0  Language - Fluency : 0  Abstraction: 1  Delayed Recall: 2  Orientation: 3  Education: 0  MOCA Score: 11  Administered by: : Laura     David Cognitive Assessment Score:  MOCA Score: 11/30.     General: pt is in NAD, cooperative.  Skin: normal turgor, moist mucous membranes, no lesions/rashes noticed.  HEENT: ATNC, white sclera, normal conjunctiva.  Respiratory: Symmetric lung excursion, no accessory respiratory muscle use.  Abdomen: Non distended.  Neurological: awake, cooperative, follows commands, no aphasia or dysarthria noted, cranial nerves II-XII: no ptosis, face is " symmetric, equally moves all extremities, there is intermittent bilateral resting hand tremor, tone mildly increases with provoking maneuvers, but normal at rest, finger tapping is slower on the left, strength is normal bilaterally, no dysmetria bilaterally, slightly stooped forward posture, stride length is preserved.  ASSESSMENT AND PLAN:                                                    Assessment: 70-year-old male patient with mild cognitive impairment presents for a follow-up.  He feels that his memory is improving, but his wife noticed further cognitive decline along with other symptoms.    His MoCA today is 11/30 compared to 19/30 in August 2020 and 22/30 in May 2019.  At the last visit, we discussed repeating neuropsychologic evaluation when we see further decline in his scores.  Lewy body dementia was listed in the differential on his initial neuropsychological testing, though I did not see any parkinsonian signs previously.    His today's exam demonstrates mild extrapyramidal features, which is along with progression of his cognitive impairment to dementia, report of visual hallucinations, cognitive fluctuations, and REM behavior sleep disorder, would be quite supportive of Lewy body dementia diagnosis.    I would like to repeat his brain MRI to exclude any additional contributing factors given his relatively expedited cognitive decline.  We will also repeat CPT and neuropsychologic evaluation.    We should start medication treatment for the dementia.    After the extensive discussion, we decided to try rivastigmine.  Side effects were reviewed in detail.  They include but not limited to reduced mental alertness, nausea, vomiting, diarrhea, depression, tremor/other parkinsonian symptom worsening, and seizures.  The patient was advised to contact my clinic if he develops any intolerable side effects.  If tolerated well, we will plan to increase the dose in the future.    Diagnoses:    ICD-10-CM    1.  Dementia with behavioral disturbance, unspecified dementia type (H)  F03.91 MR Brain w/o Contrast     OCCUPATIONAL THERAPY REFERRAL     NEUROPSYCHOLOGY REFERRAL     rivastigmine (EXELON) 1.5 MG capsule     Plan: At today's visit we thoroughly discussed current symptoms, cognitive test results (notably worsened), available treatment options, and the plan.    We decided to repeat brain MRI, CPT, neuropsychology evaluation, and to start medication to help his memory/cognition.    I counseled the patient to take 1.5 mg of rivastigmine at night for 1 to 2 weeks, then take 1.5 mg of rivastigmine twice daily.  He was advised to contact my clinic with any intolerable side effects.    To help with acting out in sleep, I recommended to increase the dose of melatonin up to 18 mg per night.  If not effective, low-dose of clonazepam could be tried next.    I emphasized that the patient should not drive due to impaired safety.    We discussed that Lewy Body Dementia Association ([phone]) and Alzheimer's Association (1-684.187.5216) would be good resources for him and his family.    Next follow-up appointment is in the next 4-6 weeks or earlier if needed.    Total Time: 69 minutes spent on the date of the encounter doing chart review, history and exam, documentation and further activities per the note.    Albaro Victoria MD  [facility]  (Chart documentation was completed in part with Dragon voice-recognition software. Even though reviewed, some grammatical, spelling, and word errors may remain.)      Again, thank you for allowing me to participate in the care of your patient.        Sincerely,        Albaro Victoria MD

## 2021-05-27 NOTE — PATIENT INSTRUCTIONS
AFTER VISIT SUMMARY (AVS):    At today's visit we thoroughly discussed current symptoms, cognitive test results (notably worsened), available treatment options, and the plan.    We decided to repeat brain MRI, CPT, neuropsychology evaluation, and start medication to help your memory/cognition.    Please take 1.5 mg of rivastigmine at night for 1 to 2 weeks, then take 1.5 mg of rivastigmine twice daily.  Please contact my clinic with any intolerable side effects.    To help with acting out in sleep, please increase the dose of melatonin up to 18 mg per night.  If not effective, low-dose of clonazepam could be tried next.    You should not drive due to impaired safety.    Lewy Body Dementia Association ([phone]) and Alzheimer's Association (1-591.756.6739) would be good resources for you and your family.    Next follow-up appointment is in the next 4-6 weeks or earlier if needed.    Please do not hesitate to call me with any questions or concerns.    Thanks.

## 2021-05-27 NOTE — PROGRESS NOTES
ESTABLISHED PATIENT NEUROLOGY NOTE    DATE OF VISIT: 5/27/2021  CLINIC LOCATION: [facility]  MRN: [id number]  PATIENT NAME: David Clark  YOB: 1950    PCP: Steve Patterson MD    REASON FOR VISIT:   Chief Complaint   Patient presents with     Memory Loss     MOCA 7.3      SUBJECTIVE:                                                      HISTORY OF PRESENT ILLNESS: Patient is here to follow up regarding mild cognitive impairment.  Last seen on 8/6/2020.  The follow-up visit was planned in 6 months, but the patient did not come until now.  Please refer to my initial/other prior notes for further information.  The patient is accompanied by his wife, who participates in the interview today.    Since the last visit, the patient reports he is doing relatively well, though admits that his long-term memory is better than short-term memory.  He feels that he sleeps okay.  He exercises regularly.  He does not believe that he has any additional medical issues.  He denies interval development of new focal neurological symptoms.    At the last visit we decided to do behind the wheel driving evaluation due to patient's desire to drive.  He had it on 9/8/2020 and did not pass (felt no longer safe to operate a motor vehicle).  He did not return to driving.    Patient's wife reached out to me via My Chart in March reporting that they moved his appointment from April to May because they were in [place] until that time.  She reported worsening hallucinations, motor skills, and daily confusion.  Then, in May (5/18) she reached out again reporting that he believes in recovery from his illness and feels that eventually he will be able to return to driving.    Today, patient's wife reports continued cognitive decline.  He is not able to put his CPAP mask properly any longer.  He reports frequent visual hallucinations (different animals).  He might confabulate if he does not remember the  "information.  He is also acting out in sleep (yells and kicks) that prompted her to sleep in a separate bedroom.  They tried 12 mg of melatonin, but this was not effective.  He is very argumentative.  She also noticed that he has difficulty with buttoning/dressing, and his dexterity is bilaterally affected.  She has no additional concerns.    On review of systems, patient endorses any additional active complaints. Medications, allergies, family and social history were also reviewed. There are no changes reported by patient.  REVIEW OF SYSTEMS:                                                    10-system review was completed. Pertinent positives are included in HPI. The remainder of ROS is negative.  EXAM:                                                    Physical Exam:   Vitals: BP (!) 141/74   Pulse 62   Ht 1.765 m (5' 9.5\")   Wt 99.8 kg (220 lb)   SpO2 100%   BMI 32.02 kg/m    [place] Cognitive Assessment:    David Cognitive Assessment (MOCA)  Visuospatial/Executive : 0  Naming: 3  Attention - Digits: 1  Attention - Letters: 1  Attention - Subtraction: 0  Language - Repeat: 0  Language - Fluency : 0  Abstraction: 1  Delayed Recall: 2  Orientation: 3  Education: 0  MOCA Score: 11  Administered by: : Laura     David Cognitive Assessment Score:  MOCA Score: 11/30.     General: pt is in NAD, cooperative.  Skin: normal turgor, moist mucous membranes, no lesions/rashes noticed.  HEENT: ATNC, white sclera, normal conjunctiva.  Respiratory: Symmetric lung excursion, no accessory respiratory muscle use.  Abdomen: Non distended.  Neurological: awake, cooperative, follows commands, no aphasia or dysarthria noted, cranial nerves II-XII: no ptosis, face is symmetric, equally moves all extremities, there is intermittent bilateral resting hand tremor, tone mildly increases with provoking maneuvers, but normal at rest, finger tapping is slower on the left, strength is normal bilaterally, no dysmetria bilaterally, " slightly stooped forward posture, stride length is preserved.  ASSESSMENT AND PLAN:                                                    Assessment: 70-year-old male patient with mild cognitive impairment presents for a follow-up.  He feels that his memory is improving, but his wife noticed further cognitive decline along with other symptoms.    His MoCA today is 11/30 compared to 19/30 in August 2020 and 22/30 in May 2019.  At the last visit, we discussed repeating neuropsychologic evaluation when we see further decline in his scores.  Lewy body dementia was listed in the differential on his initial neuropsychological testing, though I did not see any parkinsonian signs previously.    His today's exam demonstrates mild extrapyramidal features, which is along with progression of his cognitive impairment to dementia, report of visual hallucinations, cognitive fluctuations, and REM behavior sleep disorder, would be quite supportive of Lewy body dementia diagnosis.    I would like to repeat his brain MRI to exclude any additional contributing factors given his relatively expedited cognitive decline.  We will also repeat CPT and neuropsychologic evaluation.    We should start medication treatment for the dementia.    After the extensive discussion, we decided to try rivastigmine.  Side effects were reviewed in detail.  They include but not limited to reduced mental alertness, nausea, vomiting, diarrhea, depression, tremor/other parkinsonian symptom worsening, and seizures.  The patient was advised to contact my clinic if he develops any intolerable side effects.  If tolerated well, we will plan to increase the dose in the future.    Diagnoses:    ICD-10-CM    1. Dementia with behavioral disturbance, unspecified dementia type (H)  F03.91 MR Brain w/o Contrast     OCCUPATIONAL THERAPY REFERRAL     NEUROPSYCHOLOGY REFERRAL     rivastigmine (EXELON) 1.5 MG capsule     Plan: At today's visit we thoroughly discussed current  symptoms, cognitive test results (notably worsened), available treatment options, and the plan.    We decided to repeat brain MRI, CPT, neuropsychology evaluation, and to start medication to help his memory/cognition.    I counseled the patient to take 1.5 mg of rivastigmine at night for 1 to 2 weeks, then take 1.5 mg of rivastigmine twice daily.  He was advised to contact my clinic with any intolerable side effects.    To help with acting out in sleep, I recommended to increase the dose of melatonin up to 18 mg per night.  If not effective, low-dose of clonazepam could be tried next.    I emphasized that the patient should not drive due to impaired safety.    We discussed that Lewy Body Dementia Association ([phone]) and Alzheimer's Association ([phone]) would be good resources for him and his family.    Next follow-up appointment is in the next 4-6 weeks or earlier if needed.    Total Time: 69 minutes spent on the date of the encounter doing chart review, history and exam, documentation and further activities per the note.    Albaro Victoria MD  [facility]  (Chart documentation was completed in part with Dragon voice-recognition software. Even though reviewed, some grammatical, spelling, and word errors may remain.)

## 2021-06-03 ENCOUNTER — ANCILLARY PROCEDURE (OUTPATIENT)
Dept: MRI IMAGING | Facility: CLINIC | Age: 71
End: 2021-06-03
Attending: PSYCHIATRY & NEUROLOGY
Payer: MEDICARE

## 2021-06-03 DIAGNOSIS — F03.91 DEMENTIA WITH BEHAVIORAL DISTURBANCE, UNSPECIFIED DEMENTIA TYPE: ICD-10-CM

## 2021-06-03 PROCEDURE — 70551 MRI BRAIN STEM W/O DYE: CPT | Mod: TC | Performed by: RADIOLOGY

## 2021-06-04 DIAGNOSIS — R39.15 URINARY URGENCY: ICD-10-CM

## 2021-06-04 RX ORDER — MIRABEGRON 50 MG/1
50 TABLET, EXTENDED RELEASE ORAL DAILY
Qty: 90 TABLET | Refills: 3 | Status: SHIPPED | OUTPATIENT
Start: 2021-06-04 | End: 2021-08-18

## 2021-06-16 ENCOUNTER — HOSPITAL ENCOUNTER (OUTPATIENT)
Dept: OCCUPATIONAL THERAPY | Facility: CLINIC | Age: 71
Setting detail: THERAPIES SERIES
End: 2021-06-16
Attending: PSYCHIATRY & NEUROLOGY
Payer: MEDICARE

## 2021-06-16 DIAGNOSIS — R29.818 DIFFICULTY BALANCING: Primary | ICD-10-CM

## 2021-06-16 DIAGNOSIS — F03.91 DEMENTIA WITH BEHAVIORAL DISTURBANCE, UNSPECIFIED DEMENTIA TYPE: ICD-10-CM

## 2021-06-16 PROCEDURE — 97165 OT EVAL LOW COMPLEX 30 MIN: CPT | Mod: GO | Performed by: OCCUPATIONAL THERAPIST

## 2021-06-16 PROCEDURE — 96125 COGNITIVE TEST BY HC PRO: CPT | Mod: GO | Performed by: OCCUPATIONAL THERAPIST

## 2021-06-16 NOTE — PROGRESS NOTES
Occupational Profile (including diagnosis and medical history): Pt is a 71 yo male with a hx of mild cognitive impairment per MD report. Per chart review, he completed behind the wheel assessment on 9/8/2020 and did not pass (felt no longer safe to operate a motor vehicle). Assessed with CPT on 6/7/19 with a score of 4.8/5.6 reported.     Functional History Interview:  Informants: Patient and Wife (Diogo)     Client s perception of memory/ thinking or functional difficulties: Pt reports forgetting items, difficulty with word finding,and short term memory. Wife reports that it is progressing in the past 6 months and that he minimizes his deficits.      Living situation: Home with wife, daughter and son in law live in guest house on property     Home maintenance activities: Cleaning the house - wife performs, daughter and her  are currently performing mowing as well as other home maintenance     Meal preparation and grocery shopping: Wife performs. Eats peanut butter sandwich when wife is busy or out running errands.      Finances and paying bills: Wife takes care of all the finances.      Medication management: Wife completes medication management. Asks wife to double check medications due to forgetting if it is the correct medication when he goes to take them.      Driving and transportation: Wife performs all transportation needs due to pt not passing behind the wheel assessment.      Leisure and routine activities: Enjoys going for walks (~40 minutes), pool aerobics at [facility], community dinners, spend time with friends, and spending time with family      ADL/Mobility/Physical Functioning: Grab bars in tub/shower. Reports frequent LOB throughout daily routine, but doesn't fall. Falls out of bed  Uses furniture to catch himself. Velcro for pants and shirt for assisting with UB/LB dressing. Wife assists occasionally with dressing tasks.     Fall Risk Screen:   Has the patient fallen 2 or more times in the last  "year? Yes      Has the patient fallen and had an injury in the past year? Yes       Timed Up and Go Score: 16 seconds    Is the patient a fall risk? Yes, department fall risk interventions implemented      Cognitive Performance Test     SUMMARY OF TEST:  The Cognitive Performance Test (CPT) is a standardized performance-based assessment to measure working memory/executive function processing capacities that underlie functional performance. Subtasks include common basic and instrumental activities of daily living (ADL/IADL) which are rated based on the manner in which patients respond to task demands of varying complexity. The total CPT score describes a level of functioning that indicates how information is processed, implications for functional activities, potential safety risks and a recommended level of supervision or assist based on cognitive function. The highest total score on this test is in the range of 5.6 to 5.8.     DATE OF TESTIN2021    RESULTS OF TESTING:                                                                                           CPT Subtest Results   MEDBOX: 3. SHOP/GLOVES:  PHONE:    WASH:  3.5/5 TRAVEL:  TOAST:    DRESS:    TOTAL CPT SCORE:        Assessment Comments: Removing one medication box immediately stating he doesn't need it. Difficulty initiating task and turning med boxes upside down attempting to read medbox. Required specific cues to initiate task and unable to complete with simplest medication setup. Additional time needed open pill boxes, but forgetting what to do once opening the containers. Unable to complete one one step commands. Increased confusion noted with task such as \"I want an S and an S and there is no S showing up. I'm scanning all the entries and I don't see it crossing at zero\". During shop task attempted on several belts prior to determining amount of money in wallet. Picked the wrong item, but using belt on wall for price " "matching. Stated \"it's $6.79 so $7 dollars is just perfect. Okay so $10.99. During phone task, immediately picks up the phone to call Anat. Stated that he would just get on the phone and call them . Stated \"If this was a real phone call I would call Diogo to ask her to call for whatever it is we are calling for\". Dialed phone number wrong x 3, but able to complete task of asking person on other line the appropriate information with all details accurate. Difficulty opening bread bag during toasting task and immediately attempting to open without removing tab first x 2. Impatient for toast to finish stating is this done x 2. Stating \"Do you have a sterile item to put these on. This kitchen is a failure\".    Average CPT Score  4.1/5.6     INTERPRETATION OF TEST RESULTS:   Based on the Cognitive Performance Test, this patient scored at CPT Level 4.0.  See CPT Levels reference below.     Summary of functional cognitive status:     CPT Levels Reference:  Patient's Average CPT Score: 4.0                                                                                                                        Individual scores range along a continuum as outlined below.  In addition to cognitive status, other factors may affect safety in a home environment.  Please refer to specific recommendations for this patient.   X   4.0  Moderate cognitive-functional disability; abstract to concrete thought processes. Working memory and executive function impairments are obvious. Difficulty with planning and problem solving.  Behavior is goal-directed, but unable to follow multi-step directions, is easily distracted, and may not recognize mistakes.  Inability to anticipate hazards or understand precautions.  Safety:  Recommend 24-hour supervision for safety. Complete assistance required for medication management and for hazardous activities. May not be able to follow a restricted diet if necessary. Likely to get lost in unfamiliar " surroundings. Generally, persons functioning at level 4 should not be driving. In this case, Pt should absolutely refrain from all IADLs that could impact safety as his short term memory, problem solving, sustained/divided attention, and sequencing are significantly impacted due to cognition level. Diminished insight into current situation and deficits also noted throughout assessment.   ADL:  Moderate-severe decline in quality or frequency of ADLs is noticeable.  [place] enhanced by use of a routine, simple concrete directions, and caregiver set-up of needed items. Complex tasks such as money or home management typically requires assistance.  Relies heavily on vision to guide behavior; will ignore objects/hazards not in plain sight and can be distracted by irrelevant objects. Often has poor insight.  Able to carry out social conversation and may verbally  cover  for deficits leading caregivers to believe they are capable of functioning independently.    Factors affecting performance: Impaired mobility     Recommendations:   Supervision for safety with ADL/IADL tasks with assist as needed including, but not limited to:  ADLs, Meal preparation, Cleaning, Laundry, Shopping, Finances, Driving, Home management, and Medication management  Supervision in living settin hour supervision required for safety                                                   TIME ADMINISTERING TEST: 50     TIME FOR INTERPRETATION AND PREPARATION OF REPORT: 20     TOTAL TIME: 70

## 2021-07-05 NOTE — PROGRESS NOTES
ESTABLISHED PATIENT NEUROLOGY NOTE    DATE OF VISIT: 7/6/2021  CLINIC LOCATION: [facility]  MRN: [id number]  PATIENT NAME: David Clark  YOB: 1950    PCP: Stvee Patterson MD    REASON FOR VISIT:   Chief Complaint   Patient presents with     RECHECK     memory     SUBJECTIVE:                                                      HISTORY OF PRESENT ILLNESS: Patient is here to follow up regarding dementia.  The last visit was on 5/27/2021.  At that time, additional testing was ordered, and the patient was started on rivastigmine.  Please refer to my initial/other prior notes for further information.  The patient is accompanied by his wife, who participates in the interview today.    Since the last visit, the patient reports no significant changes in his symptoms.  Tolerates rivastigmine at 1.5 mg twice daily without noticeable side effects.  He had an episode of diarrhea for 2 days, but adjusted his fiber pill that led to resolution of it.  He denies interval development of new focal neurological symptoms.  He did not increase his melatonin.  Wife reports that he continues to act up in sleep.  She placed padded side rails to prevent injuries.  Wife has no additional concerns.    CPT from 6/16/2021 was 4.1/5.6, consistent with moderate cognitive functional disability that would require 24-hour supervision for safety.  Neuropsychology testing was not set up yet.  Wife contacted neuropsychology clinic twice, but have not heard back yet.    Brain MRI without contrast from 6/3/2021 demonstrated moderate parenchymal volume loss without additional acute abnormalities.  Images were personally reviewed and independently interpreted.    On review of systems, patient endorses no additional active complaints. Medications, allergies, family and social history were also reviewed. There are no changes reported by patient.  REVIEW OF SYSTEMS:                                                     10-system review was completed. Pertinent positives are included in HPI. The remainder of ROS is negative.  EXAM:                                                    Physical Exam:   Vitals: /68   Pulse 71   Wt 99.8 kg (220 lb)   SpO2 96%   BMI 32.02 kg/m      General: pt is in NAD, cooperative.  Skin: normal turgor, moist mucous membranes, no lesions/rashes noticed.  HEENT: ATNC, white sclera, normal conjunctiva.  Respiratory: Symmetric lung excursion, no accessory respiratory muscle use.  Abdomen: Non distended.  Neurological: awake, cooperative, follows commands, no exam changes compared to the previous visit.  ASSESSMENT AND PLAN:                                                    Assessment: 70-year-old male patient with dementia due to suspected Lewy body disease presents for follow-up.  He partially completed the recommended work-up.  His CPT was 4.0/5.6, consistent with moderate cognitive functional disability that would require 24-hour supervision for safety.  I shared these results with the patient and his wife.  Also emphasized that he should not drive based on the results as minimal passing score for that would be 4.7/5.6.  His brain MRI is unrevealing, we reviewed images together.  Neuropsychology evaluation still needs to be completed.  I will reach Dr. Wilder directly to facilitate it.      The patient seems to tolerate rivastigmine without difficulties.  We will continue the same dose.  We also discussed his acting out in sleep and I suggested the patient to take melatonin up to 18 mg per night to see if it helps his symptoms.  Otherwise, we will plan to start low-dose of clonazepam (0.5 mg at bedtime) at the next visit if melatonin is ineffective or sooner if needed.    Diagnoses:    ICD-10-CM    1. Dementia with behavioral disturbance, unspecified dementia type (H)  F03.91      Plan: At today's visit we thoroughly discussed current symptoms, evaluation results, and the plan.    We  decided to continue rivastigmine without changes.  The prescription should be good until the next appointment.  We will repeat cognitive testing (MoCA) at that time.    The patient will complete neuropsychology evaluation, as previously advised.  I will contact neuropsychology clinic to facilitate it.    Next follow-up appointment is in the next 3 months or earlier if needed.    Total Time: 31 minutes spent on the date of the encounter doing chart review, history and exam, documentation and further activities per the note.    Albaro Victoria MD  [facility]  (Chart documentation was completed in part with Dragon voice-recognition software. Even though reviewed, some grammatical, spelling, and word errors may remain.)

## 2021-07-05 NOTE — PATIENT INSTRUCTIONS
AFTER VISIT SUMMARY (AVS):    At today's visit we thoroughly discussed current symptoms, evaluation results, and the plan.    We decided to continue rivastigmine without changes.  The prescription should be good until the next appointment.  We will repeat cognitive testing (MoCA) at that time.    Please complete neuropsychology evaluation, as previously advised.  I will contact Dr. Wilder to facilitate it.    Next follow-up appointment is in the next 3 months or earlier if needed.    Please do not hesitate to call me with any questions or concerns.    Thanks.

## 2021-07-06 ENCOUNTER — OFFICE VISIT (OUTPATIENT)
Dept: NEUROLOGY | Facility: CLINIC | Age: 71
End: 2021-07-06
Attending: PSYCHIATRY & NEUROLOGY
Payer: MEDICARE

## 2021-07-06 VITALS
OXYGEN SATURATION: 96 % | HEART RATE: 71 BPM | SYSTOLIC BLOOD PRESSURE: 118 MMHG | BODY MASS INDEX: 32.02 KG/M2 | WEIGHT: 220 LBS | DIASTOLIC BLOOD PRESSURE: 68 MMHG

## 2021-07-06 DIAGNOSIS — F03.91 DEMENTIA WITH BEHAVIORAL DISTURBANCE, UNSPECIFIED DEMENTIA TYPE: Primary | ICD-10-CM

## 2021-07-06 PROCEDURE — G0463 HOSPITAL OUTPT CLINIC VISIT: HCPCS

## 2021-07-06 PROCEDURE — 99214 OFFICE O/P EST MOD 30 MIN: CPT | Performed by: PSYCHIATRY & NEUROLOGY

## 2021-07-06 NOTE — LETTER
7/6/2021         RE: David Clark  [address] [facility] [id number]        Dear Colleague,    Thank you for referring your patient, David Clark, to the [facility] [place]. Please see a copy of my visit note below.    ESTABLISHED PATIENT NEUROLOGY NOTE    DATE OF VISIT: 7/6/2021  CLINIC LOCATION: [facility]  MRN: [id number]  PATIENT NAME: David Clark  YOB: 1950    PCP: Steve Patterson MD    REASON FOR VISIT:   Chief Complaint   Patient presents with     RECHECK     memory     SUBJECTIVE:                                                      HISTORY OF PRESENT ILLNESS: Patient is here to follow up regarding dementia.  The last visit was on 5/27/2021.  At that time, additional testing was ordered, and the patient was started on rivastigmine.  Please refer to my initial/other prior notes for further information.  The patient is accompanied by his wife, who participates in the interview today.    Since the last visit, the patient reports no significant changes in his symptoms.  Tolerates rivastigmine at 1.5 mg twice daily without noticeable side effects.  He had an episode of diarrhea for 2 days, but adjusted his fiber pill that led to resolution of it.  He denies interval development of new focal neurological symptoms.  He did not increase his melatonin.  Wife reports that he continues to act up in sleep.  She placed padded side rails to prevent injuries.  Wife has no additional concerns.    CPT from 6/16/2021 was 4.1/5.6, consistent with moderate cognitive functional disability that would require 24-hour supervision for safety.  Neuropsychology testing was not set up yet.  Wife contacted neuropsychology clinic twice, but have not heard back yet.    Brain MRI without contrast from 6/3/2021 demonstrated moderate parenchymal volume loss without additional acute abnormalities.  Images were personally reviewed and independently  interpreted.    On review of systems, patient endorses no additional active complaints. Medications, allergies, family and social history were also reviewed. There are no changes reported by patient.  REVIEW OF SYSTEMS:                                                    10-system review was completed. Pertinent positives are included in HPI. The remainder of ROS is negative.  EXAM:                                                    Physical Exam:   Vitals: /68   Pulse 71   Wt 99.8 kg (220 lb)   SpO2 96%   BMI 32.02 kg/m      General: pt is in NAD, cooperative.  Skin: normal turgor, moist mucous membranes, no lesions/rashes noticed.  HEENT: ATNC, white sclera, normal conjunctiva.  Respiratory: Symmetric lung excursion, no accessory respiratory muscle use.  Abdomen: Non distended.  Neurological: awake, cooperative, follows commands, no exam changes compared to the previous visit.  ASSESSMENT AND PLAN:                                                    Assessment: 70-year-old male patient with dementia due to suspected Lewy body disease presents for follow-up.  He partially completed the recommended work-up.  His CPT was 4.0/5.6, consistent with moderate cognitive functional disability that would require 24-hour supervision for safety.  I shared these results with the patient and his wife.  Also emphasized that he should not drive based on the results as minimal passing score for that would be 4.7/5.6.  His brain MRI is unrevealing, we reviewed images together.  Neuropsychology evaluation still needs to be completed.  I will reach Dr. Wilder directly to facilitate it.      The patient seems to tolerate rivastigmine without difficulties.  We will continue the same dose.  We also discussed his acting out in sleep and I suggested the patient to take melatonin up to 18 mg per night to see if it helps his symptoms.  Otherwise, we will plan to start low-dose of clonazepam (0.5 mg at bedtime) at the next visit if  melatonin is ineffective or sooner if needed.    Diagnoses:    ICD-10-CM    1. Dementia with behavioral disturbance, unspecified dementia type (H)  F03.91      Plan: At today's visit we thoroughly discussed current symptoms, evaluation results, and the plan.    We decided to continue rivastigmine without changes.  The prescription should be good until the next appointment.  We will repeat cognitive testing ([facility]) at that time.    The patient will complete neuropsychology evaluation, as previously advised.  I will contact neuropsychology clinic to facilitate it.    Next follow-up appointment is in the next 3 months or earlier if needed.    Total Time: 31 minutes spent on the date of the encounter doing chart review, history and exam, documentation and further activities per the note.    Albaro Victoria MD  [facility]  (Chart documentation was completed in part with Dragon voice-recognition software. Even though reviewed, some grammatical, spelling, and word errors may remain.)      Again, thank you for allowing me to participate in the care of your patient.        Sincerely,        Albaro Victoria MD

## 2021-07-13 ENCOUNTER — TELEPHONE (OUTPATIENT)
Dept: NEUROPSYCHOLOGY | Facility: CLINIC | Age: 71
End: 2021-07-13

## 2021-07-13 NOTE — TELEPHONE ENCOUNTER
Spoke with patient's wife. Let her know that he has already been triaged and that she will hear from the clinic coordinators soon to get him scheduled for a follow up with Dr. Wilder.

## 2021-08-03 DIAGNOSIS — R45.86 MOOD CHANGES: ICD-10-CM

## 2021-08-03 RX ORDER — CITALOPRAM HYDROBROMIDE 10 MG/1
TABLET ORAL
Qty: 273 TABLET | Refills: 1 | Status: SHIPPED | OUTPATIENT
Start: 2021-08-03 | End: 2021-10-01

## 2021-08-11 PROBLEM — G47.33 OSA (OBSTRUCTIVE SLEEP APNEA): Chronic | Status: ACTIVE | Noted: 2018-08-29

## 2021-08-11 PROBLEM — F03.90 DEMENTIA WITHOUT BEHAVIORAL DISTURBANCE, UNSPECIFIED DEMENTIA TYPE: Chronic | Status: ACTIVE | Noted: 2020-09-23

## 2021-08-12 ENCOUNTER — VIRTUAL VISIT (OUTPATIENT)
Dept: SLEEP MEDICINE | Facility: CLINIC | Age: 71
End: 2021-08-12
Payer: MEDICARE

## 2021-08-12 DIAGNOSIS — G47.33 OSA (OBSTRUCTIVE SLEEP APNEA): Primary | ICD-10-CM

## 2021-08-12 PROCEDURE — 99442 PR PHYSICIAN TELEPHONE EVALUATION 11-20 MIN: CPT | Mod: 95 | Performed by: PHYSICIAN ASSISTANT

## 2021-08-12 NOTE — PROGRESS NOTES
"David Clark is a 71 year old male being evaluated via a billable telephone visit.     \"This telephone visit will be conducted via a call between you and your physician/provider. We have found that certain health care needs can be provided without the need for an in-person visit or physical exam.  This service lets us provide the care you need with a telephone conversation.  If a prescription is necessary we can send it directly to your pharmacy.  If lab work is needed we can place an order for that and you can then stop by our lab to have the test done at a later time.\"    Telephone visits are billed at different rates depending on your insurance coverage.  Please reach out to your insurance provider with any questions.    Patient has given verbal consent for  a Telephone visit? Yes    What telephone number would you like your provider to contact at at:  [phone]     How would you like to obtain your AVS? Moriah Taylor MA    Telephone Visit Details:     Telephone Visit Start Time: 10:05 AM    Telephone Visit End Time:10:19 AM     Obstructive Sleep Apnea - PAP Follow-Up Visit:    Chief Complaint   Patient presents with     CPAP Follow Up       David Clark comes in today for follow-up of their mild sleep apnea, managed with CPAP.     David Clark had a sleep study done on 7/11/2018 at the [facility] for memory concerns, snoring, witnessed apnea, daytime sleepiness, morning headaches, crowded oropharynx, large neck circumference and co-morbid DMII and HTN.    PSG date 7/11/2018-The combined apnea/hypopnea index was 8.9 events per hour (central apnea/hypopnea index was 2.0 events per hour). The REM AHI was 23.2 events per hour. The supine AHI was 10.7 events per hour. The RERA index was 4.3 events per hour. The RDI was 13.2 events per hour.  Baseline oxygen saturation was 93.0%. Lowest oxygen saturation was 87.5%. Time spent less than or equal to 88% was 0.1 minutes. Time spent " less than or equal to 89% was 0.9 minutes. Movement Activity: Frequent PLM s (index 90) with rare cortical arousals (index 5.4). Small number of REM epochs with phasic lack of atonia, but would not appear to fit criteria for REM-sleep behavior disorder.    Overall, the patient rates his experience with PAP as 10 (0 poor, 10 great). The mask is comfortable. The mask is not leaking.  He is not snoring with the mask on. He is not having gasp arousals. He is not having any oral or nasal dryness. The pressure settings are comfortable    His PAP interface is Nasal Pillows.    Bedtime is typically 10-11 PM. Usually it takes about 5 minutes to fall asleep with the mask on. Wake time is typically 8 AM.  Patient is using PAP therapy --- hours per night. The patient is usually getting 9 hours of sleep per night.    He does feel rested in the morning. The wife reports that     Total score - [place]: 17 (8/9/2021  7:50 AM)      No data recorded    ResMed   Auto-PAP 10.0 - 15.0 cmH2O 30 day usage data:    6% of days with > 4 hours of use. 23/30 days with no use.   Average use 27 minutes per day.   95%ile Leak 20.4 L/min.   CPAP 95% pressure 12.9 cm.   AHI 35.07 events per hour.     Current problems with PAP use include:  1. Memory problems, dementia has gotten worse  2. Active dreams.    Past medical/surgical history, family history, social history, medications and allergies were reviewed.      Problem List:  Patient Active Problem List    Diagnosis Date Noted     Dementia without behavioral disturbance, unspecified dementia type (H) 09/23/2020     Priority: Medium     Mood changes 09/23/2020     Priority: Medium     Sensitivity to medication, initial encounter 03/11/2019     Priority: Medium     Hyperlipidemia LDL goal <130 09/14/2018     Priority: Medium     EDY (obstructive sleep apnea) 08/29/2018     Priority: Medium     PSG date 7/11/2018-The combined apnea/hypopnea index was 8.9 events per hour (central apnea/hypopnea index  was 2.0 events per hour). The REM AHI was 23.2 events per hour. The supine AHI was 10.7 events per hour. The RERA index was 4.3 events per hour. The RDI was 13.2 events per hour.  Baseline oxygen saturation was 93.0%. Lowest oxygen saturation was 87.5%. Time spent less than or equal to 88% was 0.1 minutes. Time spent less than or equal to 89% was 0.9 minutes. Movement Activity: Frequent PLM s (index 90) with rare cortical arousals (index 5.4). Small number of REM epochs with phasic lack of atonia, but would not appear to fit criteria for REM-sleep behavior disorder.        Class 1 obesity due to excess calories with body mass index (BMI) of 31.0 to 31.9 in adult, unspecified whether serious comorbidity present 07/10/2018     Priority: Medium     Erectile dysfunction, unspecified erectile dysfunction type 07/29/2016     Priority: Medium     Prediabetes 07/29/2016     Priority: Medium     Advanced directives, counseling/discussion 05/29/2013     Priority: Medium     Patient does not have an Advance/Health Care Directive (HCD), requests blank HCD form.    Nazia Jordy  May 29, 2013         Thyroid nodule 11/22/2010     Priority: Medium     Tinea versicolor 09/23/2010     Priority: Medium        There were no vitals taken for this visit.    Impression/Plan:    Mild obstructive sleep apnea-  Decrease in compliance with worsening dementia and cognitive decline.  We reviewed treatment options. The patient's spouse reports that he feels better on the nights he uses CPAP and wishes to continue on CPAP. She will pay more attention to his CPAP use.    Possible REM behavior disorder-  Yells and kicks.   Melatonin increased to 18 mg about a month ago.     David Clark will follow up in about 3 month(s).     Melanie Marinelli PA-C

## 2021-08-12 NOTE — PATIENT INSTRUCTIONS

## 2021-08-16 DIAGNOSIS — R39.15 URINARY URGENCY: ICD-10-CM

## 2021-08-16 NOTE — TELEPHONE ENCOUNTER
Requested Prescriptions   Pending Prescriptions Disp Refills     mirabegron (MYRBETRIQ) 50 MG 24 hr tablet 90 tablet 3     Sig: Take 1 tablet (50 mg) by mouth daily       There is no refill protocol information for this order        Last office visit: Visit date not found with prescribing provider:  Dr. Mooney   Future Office Visit:   Next 5 appointments (look out 90 days)    Oct 01, 2021 11:30 AM  Return Visit with Albaro Vitcoria MD  [facility] [place] ([facility] ) [address]  [address] [place] [id number]  [phone]               Denise Behrendt  Specialty CSS

## 2021-08-18 RX ORDER — MIRABEGRON 50 MG/1
50 TABLET, EXTENDED RELEASE ORAL DAILY
Qty: 90 TABLET | Refills: 3 | Status: SHIPPED | OUTPATIENT
Start: 2021-08-18

## 2021-08-20 NOTE — PROGRESS NOTES
[facility]  OUTPATIENT OCCUPATIONAL THERAPY  EVALUATION  PLAN OF TREATMENT FOR OUTPATIENT REHABILITATION  (COMPLETE FOR INITIAL CLAIMS ONLY)  Patient's Last Name, First Name, M.I.  YOB: 1950  David Clark                        Provider's Name  [facility] Medical Record No.  [id number]                               Onset Date:     05/27/21   Start of Care Date:     06/16/21   Type:     ___PT   _X_OT   ___SLP Medical Diagnosis:     Dementia with behavioral disturbance, unspecified dementia type (H)                          OT Diagnosis:     Mild Cognitive Impairment Visits from SOC:  1   _________________________________________________________________________________  Plan of Treatment/Functional Goals:  Cognitive performance testing     Goals  Goal Identifier: Cognitive Performance Testing  Goal Description: Patient and family to verbalize understanding of Cognitive Performance Test results and recommendations and identify 3 strategies to increase patient's safety in his home setting.  Target Date: 06/16/21  Date Met: 06/16/21    Therapy Frequency: 1x visit     Demetra Paul OTR/L         I CERTIFY THE NEED FOR THESE SERVICES FURNISHED UNDER        THIS PLAN OF TREATMENT AND WHILE UNDER MY CARE     (Physician co-signature of this document indicates review and certification of the therapy plan).                    Referring Physician: Albaro Victoria MD     Initial Assessment        See Epic Evaluation      Start Of Care Date: 06/16/21

## 2021-09-12 ENCOUNTER — HEALTH MAINTENANCE LETTER (OUTPATIENT)
Age: 71
End: 2021-09-12

## 2021-09-27 ENCOUNTER — OFFICE VISIT (OUTPATIENT)
Dept: NEUROLOGY | Facility: CLINIC | Age: 71
End: 2021-09-27
Attending: PSYCHIATRY & NEUROLOGY
Payer: MEDICARE

## 2021-09-27 DIAGNOSIS — G31.83 LEWY BODY DEMENTIA WITH BEHAVIORAL DISTURBANCE (H): Primary | ICD-10-CM

## 2021-09-27 DIAGNOSIS — F02.818 LEWY BODY DEMENTIA WITH BEHAVIORAL DISTURBANCE (H): Primary | ICD-10-CM

## 2021-09-27 NOTE — LETTER
9/27/2021       RE: David Clark  [id number] [facility] [id number]     Dear Colleague,    Thank you for referring your patient, David Clark, to the [facility] at [facility]. Please see a copy of my visit note below.    Patient was seen for neuropsychological evaluation at the request of Dr. Albaro Mott, for the purposes of diagnostic clarification and treatment planning.  2 hrs 5 min of test administration and scoring were provided by this writer, Annette May.  Please see Dr. Rodrigo Wilder's report for a full interpretation of the findings.      Adult Neuropsychology Clinic  [facility]      NEUROPSYCHOLOGICAL EVALUATION    RELEVANT HISTORY AND REASON FOR REFERRAL    This is a report of neuropsychological consultation regarding David Clark, a 71-year-old, right-handed man with 20 years of formal education. He presents with concerns about cognitive decline, on referral from his neurologist, Dr. Albaro Victoria. I saw Mr. Clark for a baseline evaluation on 7/3/2019, with about 3 years of changes reported by family members at that time. He had an intense confusional episode in June 2018, with EEG that showed diffuse slowing but no epileptogenicity. Brain MRI in July 2018 showed mild cerebral atrophy and mildly prominent iron deposition in the globus pallidus and substantia nigra regions. My cognitive evaluation in July 2019 showed primary deficits in complex spatial information processing, constructional abilities, executive/attentional control, mental speed, and psychomotor speed. Clinically, the course of his syndrome included several years of anosmia predating the cognitive decline, REM sleep behaviors, increased anxiousness, and intermittent troubles with motor control that included tremor and stiff/shuffling gait. I felt the primary etiologic concern was emerging Lewy body dementia, though at the time he was not in a  state of dementia and mild cognitive impairment (MCI) was the more appropriate diagnosis. He has followed with Dr. Victoria for neurologic care. A progressive decline in physical and cognitive functioning has been apparent, and he has developed anosognosia, continual confabulation, hallucinations, and growing extrapyramidal features. He failed a driving test in September 2020 and does not have a license anymore, but still he believes he should be able to drive (I am told today that he recently made some attempts at purchasing a vehicle). He has frequent auditory and visual hallucinations. His wife also reports significant fluctuations in cognitive status. Scores on the MoCA were 22/30 in May 2019, 19/30 in August 2020, and 11/30 in May 2021. Functional capacity testing also showed worsening, with CPT scores of 4.8/5.6 in June 2019 then 4.1/5.6 in June 2021. OT has advised 24/7 supervision. Rivastigmine was started in May 2021. Extrapyramidal signs were apparent on Dr. Victoria s exam in May 2021. His wife reports substantial problems with mobility, which are readily observable today. They put reflective tape on their stairs because he has trouble navigating them safely. His memory is deficient. He did not recognize his daughter recently. His wife is no longer able to have him involved in financial decisions. She cannot trust him to be left alone at home. He is resistant to all forms of help because he believes he is fully functional. She is overwhelmed with caregiving responsibilities. She plans on starting to have him go to an adult day program and to set up in-home caregiving support. She plans on enrolling him in a [facility] study for Lewy body dementia treatment that is set to start in 2022. Dr. Victoria referred Mr. Clark for neuropsychological reevaluation in this context, for an updated cognitive assessment and to aid in treatment planning.     In today's interview, Mr. Clark is not able to provide  reliable information about his recent history. He clearly demonstrates an absence of insight into his deficits and functional limitations. He feels that things are going better than they had been at his baseline evaluation in 2019. However, what he goes on to describe are improvements in his ability to run, saying that he used to be able to do 2 miles at the gym but can now run for 3 miles (his wife indicates that this is an incorrect assessment in its own right). They do try to stay physically active as well as mentally active, with a lot of puzzles and activities. There have been no adverse effects since starting rivastigmine. He still lives at home with his wife. Their oldest daughter lives in a guesthouse on the property. As noted, he disagrees about driving, and he says that he wants to be able to get his  s license. He perseverates on the idea that if he could get a new car with extra sensors, then that would allow him to safely drive.    As noted above, there has been degradation as mobility. He has not had any strokes or seizures. Brain MRI on 6/3/2021 showed mild cerebral volume loss without any focal predilection, as well as mild small vessel ischemic disease. The radiologist felt these features were unchanged compared to 2018.    He has substantial difficulties with urinary and fecal control. He has had a workup for these issues, and they were told they are all driven by neurologic degradation. He struggles with constipation. He does not seem to understand the situation, such that he seems to think that his toilets are broken and that's what is causing him to be unable to go.    As noted, anosmia was present for at least a couple of years prior to his 2019 baseline. He has had substantial visual perceptual difficulties, but a basic optometry exam said that his eyes were just fine (i.e., his spatial processing deficits are neurologic in nature). His wife says he seems to not be able to hear things  that are said to him at times, but his hearing has not been formally tested.    His wife says he is having frequent visual and auditory hallucinations. He demonstrates completely confabulatory explanations for this today. 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 However, the lab is closed, and nobody is doing such repairs.     REM sleep behaviors have continued, and his wife has put padded rails on the bed to prevent injuries. A recent start to using melatonin has provided considerable benefit with REM behaviors and overall sleep quality.     Mr. Clark reports no concerns about mood or anxiety at this time, but his wife indicates there are still significant issues.    He reports an alcohol habit of perhaps two drinks per week. He does not use tobacco or illicit drugs.    He has not had a COVID-19 infection. He has been vaccinated.    His current medication list includes rivastigmine, citalopram, simvastatin, and mirabegron.     Please see my prior report from 7/3/2019 for additional psychosocial background. Reported family medical history is notable for his mother losing the use of her legs around age 75. The family was apparently told she had ALS, but there was some uncertainty and some question of whether or not it was Guillain-Barré syndrome. His father demonstrated cognitive decline late in life, and they suspected the effects of general anesthesia from multiple surgeries. His maternal grandmother required skilled nursing care for severe dementia with violent tendencies.    BEHAVIORAL OBSERVATIONS    Mr. Clark was polite and cooperative with the evaluation. There were indications of masking of his affective display. He showed stiff posture with a subtle forward lean when standing. There was a rhythmic tremor in his hand and fingers that fluctuated in frequency and amplitude. He had  substantial trouble rising from a chair. He walked with stiff legs and a slight shuffle with short steps or a bit of a waddle. He demonstrated an absence of insight into his presenting concerns. He showed no appreciation of nonverbal cues during conversations, and he was perseverative displayed explicit cues to switch topics. He was tangential and distractible during the testing session. He had mild issues with speech production, with a bit of slowing, articulation difficulty, and word-finding lapses. He struggled to comprehend test instructions, and he required many repetitions and simplifications. He was slowed in thought and action. The pace of testing was much slower than usual. He persisted well with requested procedures and appeared to put forth his best possible effort. The test results are seen as valid estimations of his cognitive status.    MEASURES ADMINISTERED    The following measures were administered by a trained psychometrist, under my supervision:    Orientation: Time, Place, Basic Personal Information, Recent US Presidents; Wechsler Adult Intelligence Scale-IV: Similarities, Block Design, Digit Span, Coding; Controlled Oral Word Association Test; [place] Naming Test; Jayesh Visual Acuity Screen; Clock Drawing; Trail Making Test; [place] Maze Test; Edmund-Osterrieth Complex Figure Test; Edmund Auditory Verbal Learning Test; Geriatric Depression Scale.    RESULTS AND INTERPRETATION    Orientation: Orientation was very abnormal for time, with the wrong month, date, year (said 2012), day of the week, and time of day. He was unable to demonstrate orientation to place. Orientation to basic personal information was intact. Orientation to basic cultural information was abnormally low, thinking that the current U.[place]. President was Obama and the president just before him was Trhelen. When asked to name any other presidents from the last 40 years, his best guess was Sonia.    Intellectual Abilities: Abstract  verbal analogical reasoning was on the lower side of average. Visuospatial reasoning through hands-on object assembly was exceptionally low.     Language & Related Skills: Confrontation naming was low average. Letter-based verbal fluency was exceptionally low.    Visual Perceptual & Constructional Skills: Binocular, corrected, near-point visual acuity was 20/50 on Jayesh screening. Clock drawing was very abnormal. Copying a complex geometric figure was exceptionally low, with defective gestalt apprehension and only partially reproducing 4 or 5 (out of 18) elements after close to 6 minutes of trying.    Mental Speed & Executive Functioning: As noted above, speeded verbal fluency performances were exceptionally low. Cognitive processing speed could not be assessed by a timed transcription task, as he could not get through the practice items. Visual scanning and graphomotor sequencing under simple conditions was exceptionally low. Scanning and sequencing under greater executive demands to control divided attention was so impaired that he could not get past the practice items.     Attention & Working Memory: Immediate auditory attention and working memory were below average for repeating and rearranging digit strings.    Learning & Anterograde Memory: Learning a word list over repeated readings was below average. Fee recall of the list was exceptionally low (0 recall) after a delay of about 2 minutes and again after 30 minutes. Delayed recognition of the list was exceptionally low, with more false-positive errors than true hits. A few minutes after the initial copy, incidental free recall of the complex figure was Exceptionally low. After 30 minutes, recall of the figure remained exceptionally low, and recognition of individual figure elements was exceptionally low.     Emotional Functioning: On a brief self-report inventory, he endorsed minimally to borderline elevated symptoms related to depression and anxiety (GDS  = 7/30).    IMPRESSIONS AND RECOMMENDATIONS    The neuropsychological results are abnormal. Compared to his baseline in 2019, Mr. Clark demonstrates significant decline. He has two verbally mediated performances today (analogical reasoning, confrontation naming) that remained within the average range is. All other performances today are very abnormal. He continues to demonstrate severe impairments in complex spatial information processing, visual constructional abilities, executive functioning, and mental speed. His performances on measures of learning and memory are now exceptionally low as well. these findings align with the reported clinical history over the last couple of years, and with cognitive screenings performed in Dr. Victoria s office. Mr. Clark has transitioned from MCI into a state of dementia. There appears to have been an acceleration in the last year. His neuropsychological profile and clinical features (e.g., motoric dyscontrol, REM sleep behaviors, hallucinations, cognitive fluctuations) make Lewy body disease the most likely etiology.    I met with Mr. Clark and his wife after the testing session to discuss my impressions and recommendations.     1. Continued neurologic care and monitoring are needed. I defer to Dr. Victoria on specifics of the treatment plan.  2. There is a substantial element of anosognosia, and recurrent confabulation is apparent along with deficient anterograde memory formation. Mr. Clark demonstrates insufficient cognitive capacity to understand his condition, functional limitations, and treatment needs, and thus has insufficient cognitive capacity to make reliable decisions in his own best interests. His wife tells me that durable meier of  are already set up. She should be relied on for all decision making of consequence. If there is anything lacking in the POA paperwork, an application for guardianship would be appropriate.   3. I agree that he should not be  driving. He should not return to driving under any circumstances.  4. I agree that 24/7 oversight and direct management of daily care needs (e.g., medications, nutrition) are necessary. An adult day program would be appropriate. Respite care is necessary. I encourage making connections to the Lewy Body Dementia Association (www.lbda.org) and to a clinical , to aid in planning for the years to come.   5. His wife noted that extended family members do not really understand the situation. I strongly encourage having family members read the NIH-produced booklet Lewy Body Dementia: Hope Through Research, which can be downloaded as a PDF. It is typically the first result when searching the title online.   6. Regarding his continual confabulation and lack of insight into his situation, I underscore that this is a consequence of neurologic degradation, not an intentional behavior like denial, lying, or deception. The cognitive data indicate cerebral dysfunction that is likely much more prominent in the right hemisphere compared to the left (though bilateral effects are clearly present). One way of conceptualizing broad roles for the hemispheres is that the left hemisphere tends to act like  a storyteller  trying to make purposeful sense of things, while the right hemisphere is more of  a fact-  trying to keep those stories aligned with reality. In this conceptualization, and with today s demonstration of severe deficits in learning and synthesizing new information, the development of ever-shifting confabulation is quite expected. Family members should be advised against trying to force him to see things their way or exhort him over inconsistencies, as that does more harm than good. Additional tips on behavioral management are in the NIH booklet mentioned above.   7. An up-to-date neuropsychological baseline has been obtained. I would be happy to see Mr. Clark again, as clinically indicated.     Rodrigo CONSTANTINO  Meño, PhD, LP, ABPP-CN  Board Certified in Clinical Neuropsychology  Licensed Psychologist [id number]      Time spent: 44 minutes neurobehavioral status exam including interview, clinical assessment by licensed and board-certified neuropsychologist (CPT 07949, 61130). 139 minutes neuropsychological testing evaluation by licensed and board-certified neuropsychologist, including integration of patient data, interpretation of standardized test results and clinical data, clinical decision-making, treatment planning, report, and interactive feedback to the patient (CPT 33574, 71532). 125 minutes of psychological and neuropsychological test administration and scoring by technician (CPT 71950, 24552). Diagnoses: G31.83      Again, thank you for allowing me to participate in the care of your patient.      Sincerely,    Rodrigo Wilder, PhD

## 2021-09-27 NOTE — PROGRESS NOTES
Patient was seen for neuropsychological evaluation at the request of Dr. Albaro Mott, for the purposes of diagnostic clarification and treatment planning.  2 hrs 5 min of test administration and scoring were provided by this writer, Annette May.  Please see Dr. Rodrigo Wilder's report for a full interpretation of the findings.

## 2021-09-27 NOTE — LETTER
9/27/2021     RE: David Clark  [address] [facility] [id number]     Dear Colleague,    Thank you for referring your patient, David Clark, to the [facility] at [facility]. Please see a copy of my visit note below.    Patient was seen for neuropsychological evaluation at the request of Dr. Albaro Mott, for the purposes of diagnostic clarification and treatment planning.  2 hrs 5 min of test administration and scoring were provided by this writer, Annette May.  Please see Dr. Rodrigo Wilder's report for a full interpretation of the findings.      Adult Neuropsychology Clinic  [facility]      NEUROPSYCHOLOGICAL EVALUATION    RELEVANT HISTORY AND REASON FOR REFERRAL    This is a report of neuropsychological consultation regarding David Clark, a 71-year-old, right-handed man with 20 years of formal education. He presents with concerns about cognitive decline, on referral from his neurologist, Dr. Albaro Victoria. I saw Mr. Clark for a baseline evaluation on 7/3/2019, with about 3 years of changes reported by family members at that time. He had an intense confusional episode in June 2018, with EEG that showed diffuse slowing but no epileptogenicity. Brain MRI in July 2018 showed mild cerebral atrophy and mildly prominent iron deposition in the globus pallidus and substantia nigra regions. My cognitive evaluation in July 2019 showed primary deficits in complex spatial information processing, constructional abilities, executive/attentional control, mental speed, and psychomotor speed. Clinically, the course of his syndrome included several years of anosmia predating the cognitive decline, REM sleep behaviors, increased anxiousness, and intermittent troubles with motor control that included tremor and stiff/shuffling gait. I felt the primary etiologic concern was emerging Lewy body dementia, though at the time he was not in a state  of dementia and mild cognitive impairment (MCI) was the more appropriate diagnosis. He has followed with Dr. Victoria for neurologic care. A progressive decline in physical and cognitive functioning has been apparent, and he has developed anosognosia, continual confabulation, hallucinations, and growing extrapyramidal features. He failed a driving test in September 2020 and does not have a license anymore, but still he believes he should be able to drive (I am told today that he recently made some attempts at purchasing a vehicle). He has frequent auditory and visual hallucinations. His wife also reports significant fluctuations in cognitive status. Scores on the MoCA were 22/30 in May 2019, 19/30 in August 2020, and 11/30 in May 2021. Functional capacity testing also showed worsening, with CPT scores of 4.8/5.6 in June 2019 then 4.1/5.6 in June 2021. OT has advised 24/7 supervision. Rivastigmine was started in May 2021. Extrapyramidal signs were apparent on Dr. Victoria s exam in May 2021. His wife reports substantial problems with mobility, which are readily observable today. They put reflective tape on their stairs because he has trouble navigating them safely. His memory is deficient. He did not recognize his daughter recently. His wife is no longer able to have him involved in financial decisions. She cannot trust him to be left alone at home. He is resistant to all forms of help because he believes he is fully functional. She is overwhelmed with caregiving responsibilities. She plans on starting to have him go to an adult day program and to set up in-home caregiving support. She plans on enrolling him in a [facility] study for Lewy body dementia treatment that is set to start in 2022. Dr. Victoria referred Mr. Clark for neuropsychological reevaluation in this context, for an updated cognitive assessment and to aid in treatment planning.     In today's interview, Mr. Clark is not able to provide reliable  information about his recent history. He clearly demonstrates an absence of insight into his deficits and functional limitations. He feels that things are going better than they had been at his baseline evaluation in 2019. However, what he goes on to describe are improvements in his ability to run, saying that he used to be able to do 2 miles at the gym but can now run for 3 miles (his wife indicates that this is an incorrect assessment in its own right). They do try to stay physically active as well as mentally active, with a lot of puzzles and activities. There have been no adverse effects since starting rivastigmine. He still lives at home with his wife. Their oldest daughter lives in a guesthouse on the property. As noted, he disagrees about driving, and he says that he wants to be able to get his  s license. He perseverates on the idea that if he could get a new car with extra sensors, then that would allow him to safely drive.    As noted above, there has been degradation as mobility. He has not had any strokes or seizures. Brain MRI on 6/3/2021 showed mild cerebral volume loss without any focal predilection, as well as mild small vessel ischemic disease. The radiologist felt these features were unchanged compared to 2018.    He has substantial difficulties with urinary and fecal control. He has had a workup for these issues, and they were told they are all driven by neurologic degradation. He struggles with constipation. He does not seem to understand the situation, such that he seems to think that his toilets are broken and that's what is causing him to be unable to go.    As noted, anosmia was present for at least a couple of years prior to his 2019 baseline. He has had substantial visual perceptual difficulties, but a basic optometry exam said that his eyes were just fine (i.e., his spatial processing deficits are neurologic in nature). His wife says he seems to not be able to hear things that are  said to him at times, but his hearing has not been formally tested.    His wife says he is having frequent visual and auditory hallucinations. He demonstrates completely confabulatory explanations for this today. 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 However, the lab is closed, and nobody is doing such repairs.     REM sleep behaviors have continued, and his wife has put padded rails on the bed to prevent injuries. A recent start to using melatonin has provided considerable benefit with REM behaviors and overall sleep quality.     Mr. Clark reports no concerns about mood or anxiety at this time, but his wife indicates there are still significant issues.    He reports an alcohol habit of perhaps two drinks per week. He does not use tobacco or illicit drugs.    He has not had a COVID-19 infection. He has been vaccinated.    His current medication list includes rivastigmine, citalopram, simvastatin, and mirabegron.     Please see my prior report from 7/3/2019 for additional psychosocial background. Reported family medical history is notable for his mother losing the use of her legs around age 75. The family was apparently told she had ALS, but there was some uncertainty and some question of whether or not it was Guillain-Barré syndrome. His father demonstrated cognitive decline late in life, and they suspected the effects of general anesthesia from multiple surgeries. His maternal grandmother required skilled nursing care for severe dementia with violent tendencies.    BEHAVIORAL OBSERVATIONS    Mr. Clark was polite and cooperative with the evaluation. There were indications of masking of his affective display. He showed stiff posture with a subtle forward lean when standing. There was a rhythmic tremor in his hand and fingers that fluctuated in frequency and amplitude. He had substantial  trouble rising from a chair. He walked with stiff legs and a slight shuffle with short steps or a bit of a waddle. He demonstrated an absence of insight into his presenting concerns. He showed no appreciation of nonverbal cues during conversations, and he was perseverative displayed explicit cues to switch topics. He was tangential and distractible during the testing session. He had mild issues with speech production, with a bit of slowing, articulation difficulty, and word-finding lapses. He struggled to comprehend test instructions, and he required many repetitions and simplifications. He was slowed in thought and action. The pace of testing was much slower than usual. He persisted well with requested procedures and appeared to put forth his best possible effort. The test results are seen as valid estimations of his cognitive status.    MEASURES ADMINISTERED    The following measures were administered by a trained psychometrist, under my supervision:    Orientation: Time, Place, Basic Personal Information, Recent US Presidents; Wechsler Adult Intelligence Scale-IV: Similarities, Block Design, Digit Span, Coding; Controlled Oral Word Association Test; [place] Naming Test; Jayesh Visual Acuity Screen; Clock Drawing; Trail Making Test; Porteus Maze Test; Edmund-Osterrieth Complex Figure Test; Edmund Auditory Verbal Learning Test; Geriatric Depression Scale.    RESULTS AND INTERPRETATION    Orientation: Orientation was very abnormal for time, with the wrong month, date, year (said 2012), day of the week, and time of day. He was unable to demonstrate orientation to place. Orientation to basic personal information was intact. Orientation to basic cultural information was abnormally low, thinking that the current U.S. President was Obama and the president just before him was Trhelen. When asked to name any other presidents from the last 40 years, his best guess was Sonia.    Intellectual Abilities: Abstract verbal  analogical reasoning was on the lower side of average. Visuospatial reasoning through hands-on object assembly was exceptionally low.     Language & Related Skills: Confrontation naming was low average. Letter-based verbal fluency was exceptionally low.    Visual Perceptual & Constructional Skills: Binocular, corrected, near-point visual acuity was 20/50 on Jayesh screening. Clock drawing was very abnormal. Copying a complex geometric figure was exceptionally low, with defective gestalt apprehension and only partially reproducing 4 or 5 (out of 18) elements after close to 6 minutes of trying.    Mental Speed & Executive Functioning: As noted above, speeded verbal fluency performances were exceptionally low. Cognitive processing speed could not be assessed by a timed transcription task, as he could not get through the practice items. Visual scanning and graphomotor sequencing under simple conditions was exceptionally low. Scanning and sequencing under greater executive demands to control divided attention was so impaired that he could not get past the practice items. Planning, foresight, and using direct feedback to overcome errors were abnormally low on an unspeeded maze-solving task.     Attention & Working Memory: Immediate auditory attention and working memory were below average for repeating and rearranging digit strings.    Learning & Anterograde Memory: Learning a word list over repeated readings was below average. Fee recall of the list was exceptionally low (0 recall) after a delay of about 2 minutes and again after 30 minutes. Delayed recognition of the list was exceptionally low, with more false-positive errors than true hits. A few minutes after the initial copy, incidental free recall of the complex figure was Exceptionally low. After 30 minutes, recall of the figure remained exceptionally low, and recognition of individual figure elements was exceptionally low.     Emotional Functioning: On a brief  self-report inventory, he endorsed minimally to borderline elevated symptoms related to depression and anxiety (GDS = 7/30).    IMPRESSIONS AND RECOMMENDATIONS    The neuropsychological results are abnormal. Compared to his baseline in 2019, Mr. Clark demonstrates significant decline. He has two verbally mediated performances today (analogical reasoning, confrontation naming) that remained within the average range is. All other performances today are very abnormal. He continues to demonstrate severe impairments in complex spatial information processing, visual constructional abilities, executive functioning, and mental speed. His performances on measures of learning and memory are now exceptionally low as well. these findings align with the reported clinical history over the last couple of years, and with cognitive screenings performed in Dr. Victoria s office. Mr. Clark has transitioned from MCI into a state of dementia. There appears to have been an acceleration in the last year. His neuropsychological profile and clinical features (e.g., motoric dyscontrol, REM sleep behaviors, hallucinations, cognitive fluctuations) make Lewy body disease the most likely etiology.    I met with Mr. Clark and his wife after the testing session to discuss my impressions and recommendations.     1. Continued neurologic care and monitoring are needed. I defer to Dr. Victoria on specifics of the treatment plan.  2. There is a substantial element of anosognosia, and recurrent confabulation is apparent along with deficient anterograde memory formation. Mr. Clark demonstrates insufficient cognitive capacity to understand his condition, functional limitations, and treatment needs, and thus has insufficient cognitive capacity to make reliable decisions in his own best interests. His wife tells me that durable meier of  are already set up. She should be relied on for all decision making of consequence. If there is anything lacking  in the POA paperwork, an application for guardianship would be appropriate.   3. I agree that he should not be driving. He should not return to driving under any circumstances.  4. I agree that 24/7 oversight and direct management of daily care needs (e.g., medications, nutrition) are necessary. An adult day program would be appropriate. Respite care is necessary. I encourage making connections to the Lewy Body Dementia Association (www.lbda.org) and to a clinical , to aid in planning for the years to come.   5. His wife noted that extended family members do not really understand the situation. I strongly encourage having family members read the NIH-produced booklet Lewy Body Dementia: Hope Through Research, which can be downloaded as a PDF. It is typically the first result when searching the title online.   6. Regarding his continual confabulation and lack of insight into his situation, I underscore that this is a consequence of neurologic degradation, not an intentional behavior like denial, lying, or deception. The cognitive data indicate cerebral dysfunction that is likely much more prominent in the right hemisphere compared to the left (though bilateral effects are clearly present). One way of conceptualizing broad roles for the hemispheres is that the left hemisphere tends to act like  a storyteller  trying to make purposeful sense of things, while the right hemisphere is more of  a fact-  trying to keep those stories aligned with reality. In this conceptualization, and with today s demonstration of severe deficits in learning and synthesizing new information, the development of ever-shifting confabulation is quite expected. Family members should be advised against trying to force him to see things their way or exhort him over inconsistencies, as that does more harm than good. Additional tips on behavioral management are in the NIH booklet mentioned above.   7. An up-to-date  neuropsychological baseline has been obtained. I would be happy to see Mr. Clark again, as clinically indicated.     Rodrigo Wilder, PhD, LP, ABPP-CN  Board Certified in Clinical Neuropsychology  Licensed Psychologist [id number]    Time spent: 44 minutes neurobehavioral status exam including interview, clinical assessment by licensed and board-certified neuropsychologist (CPT 22947, 40465). 139 minutes neuropsychological testing evaluation by licensed and board-certified neuropsychologist, including integration of patient data, interpretation of standardized test results and clinical data, clinical decision-making, treatment planning, report, and interactive feedback to the patient (CPT 21362, 63747). 125 minutes of psychological and neuropsychological test administration and scoring by technician (CPT 86681, 26854). Diagnoses: G31.83

## 2021-09-28 NOTE — PROGRESS NOTES
Name: David Clark MRN: [id number]  : 1950  MELÉNDEZ: 2021  Staff: KENTON Tech: FER Age: 71  Sex: M Hand: RH Educ: 20  Vision: 20/50 ?with correction / ?without correction    ORIENTATION     Time  -27     Place  0 /2     Personal info          Presidents     WAIS-IV   Raw SSa     Similarities  19 8     Block Design  2 1     Digit Span  12 3      Coding  Unable    COWAT (CFL)     Raw: 8  SS: 2 %ile: <1    [place] NAMING TEST     Raw: 52  SS: 8 %ile: 19-28    CLOCK DRAWING: Very Impaired    TRAILS Raw  Err SS %ile     A   1 2 <1     B Unable      PORTEUS MAZE TEST     Test Age: 6.0    JOCE-O    Raw    T %ile     Time to Copy  340      >16     Copy    2.5     <1     Short Delay Recall 2.0 22 <1     Long Delay Recall 2.0 20 <1     Recognition Total 16 26 1       AVLT (>55, MOANS norms)     Trial 1 2 3 4 5 B 6 30              3 4 3 4 5 2 0 0       Raw SS %ile     Trial 1    3 7 2-3     Learning Over Trials  4 5 <2     Short Delay Recall  0 2 <2     30  Recall   0 4 2     30  Recognition Hits/FPs   6 3    GDS (30-item)     Raw:  7 Interpretation: Minimal

## 2021-09-28 NOTE — PROGRESS NOTES
Adult Neuropsychology Clinic  [facility]      NEUROPSYCHOLOGICAL EVALUATION    RELEVANT HISTORY AND REASON FOR REFERRAL    This is a report of neuropsychological consultation regarding David Clark, a 71-year-old, right-handed man with 20 years of formal education. He presents with concerns about cognitive decline, on referral from his neurologist, Dr. Albaro Victoria. I saw Mr. Clark for a baseline evaluation on 7/3/2019, with about 3 years of changes reported by family members at that time. He had an intense confusional episode in June 2018, with EEG that showed diffuse slowing but no epileptogenicity. Brain MRI in July 2018 showed mild cerebral atrophy and mildly prominent iron deposition in the globus pallidus and substantia nigra regions. My cognitive evaluation in July 2019 showed primary deficits in complex spatial information processing, constructional abilities, executive/attentional control, mental speed, and psychomotor speed. Clinically, the course of his syndrome included several years of anosmia predating the cognitive decline, REM sleep behaviors, increased anxiousness, and intermittent troubles with motor control that included tremor and stiff/shuffling gait. I felt the primary etiologic concern was emerging Lewy body dementia, though at the time he was not in a state of dementia and mild cognitive impairment (MCI) was the more appropriate diagnosis. He has followed with Dr. Victoria for neurologic care. A progressive decline in physical and cognitive functioning has been apparent, and he has developed anosognosia, continual confabulation, hallucinations, and growing extrapyramidal features. He failed a driving test in September 2020 and does not have a license anymore, but still he believes he should be able to drive (I am told today that he recently made some attempts at purchasing a vehicle). He has frequent auditory and visual hallucinations. His wife also reports significant  fluctuations in cognitive status. Scores on the MoCA were 22/30 in May 2019, 19/30 in August 2020, and 11/30 in May 2021. Functional capacity testing also showed worsening, with CPT scores of 4.8/5.6 in June 2019 then 4.1/5.6 in June 2021. OT has advised 24/7 supervision. Rivastigmine was started in May 2021. Extrapyramidal signs were apparent on Dr. Victoria s exam in May 2021. His wife reports substantial problems with mobility, which are readily observable today. They put reflective tape on their stairs because he has trouble navigating them safely. His memory is deficient. He did not recognize his daughter recently. His wife is no longer able to have him involved in financial decisions. She cannot trust him to be left alone at home. He is resistant to all forms of help because he believes he is fully functional. She is overwhelmed with caregiving responsibilities. She plans on starting to have him go to an adult day program and to set up in-home caregiving support. She plans on enrolling him in a [facility] study for Lewy body dementia treatment that is set to start in 2022. Dr. Victoria referred Mr. Clark for neuropsychological reevaluation in this context, for an updated cognitive assessment and to aid in treatment planning.     In today's interview, Mr. Clark is not able to provide reliable information about his recent history. He clearly demonstrates an absence of insight into his deficits and functional limitations. He feels that things are going better than they had been at his baseline evaluation in 2019. However, what he goes on to describe are improvements in his ability to run, saying that he used to be able to do 2 miles at the gym but can now run for 3 miles (his wife indicates that this is an incorrect assessment in its own right). They do try to stay physically active as well as mentally active, with a lot of puzzles and activities. There have been no adverse effects since starting rivastigmine.  He still lives at home with his wife. Their oldest daughter lives in a guesthouse on the property. As noted, he disagrees about driving, and he says that he wants to be able to get his  s license. He perseverates on the idea that if he could get a new car with extra sensors, then that would allow him to safely drive.    As noted above, there has been degradation as mobility. He has not had any strokes or seizures. Brain MRI on 6/3/2021 showed mild cerebral volume loss without any focal predilection, as well as mild small vessel ischemic disease. The radiologist felt these features were unchanged compared to 2018.    He has substantial difficulties with urinary and fecal control. He has had a workup for these issues, and they were told they are all driven by neurologic degradation. He struggles with constipation. He does not seem to understand the situation, such that he seems to think that his toilets are broken and that's what is causing him to be unable to go.    As noted, anosmia was present for at least a couple of years prior to his 2019 baseline. He has had substantial visual perceptual difficulties, but a basic optometry exam said that his eyes were just fine (i.e., his spatial processing deficits are neurologic in nature). His wife says he seems to not be able to hear things that are said to him at times, but his hearing has not been formally tested.    His wife says he is having frequent visual and auditory hallucinations. He demonstrates completely confabulatory explanations for this today. 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 However, the lab is closed, and nobody is doing such repairs.     REM sleep behaviors have continued, and his wife has put padded rails on the bed to prevent injuries. A recent start to using melatonin has provided considerable benefit with REM  behaviors and overall sleep quality.     Mr. Clark reports no concerns about mood or anxiety at this time, but his wife indicates there are still significant issues.    He reports an alcohol habit of perhaps two drinks per week. He does not use tobacco or illicit drugs.    He has not had a COVID-19 infection. He has been vaccinated.    His current medication list includes rivastigmine, citalopram, simvastatin, and mirabegron.     Please see my prior report from 7/3/2019 for additional psychosocial background. Reported family medical history is notable for his mother losing the use of her legs around age 75. The family was apparently told she had ALS, but there was some uncertainty and some question of whether or not it was Guillain-Barré syndrome. His father demonstrated cognitive decline late in life, and they suspected the effects of general anesthesia from multiple surgeries. His maternal grandmother required skilled nursing care for severe dementia with violent tendencies.    BEHAVIORAL OBSERVATIONS    Mr. Clark was polite and cooperative with the evaluation. There were indications of masking of his affective display. He showed stiff posture with a subtle forward lean when standing. There was a rhythmic tremor in his hand and fingers that fluctuated in frequency and amplitude. He had substantial trouble rising from a chair. He walked with stiff legs and a slight shuffle with short steps or a bit of a waddle. He demonstrated an absence of insight into his presenting concerns. He showed no appreciation of nonverbal cues during conversations, and he was perseverative displayed explicit cues to switch topics. He was tangential and distractible during the testing session. He had mild issues with speech production, with a bit of slowing, articulation difficulty, and word-finding lapses. He struggled to comprehend test instructions, and he required many repetitions and simplifications. He was slowed in thought and  action. The pace of testing was much slower than usual. He persisted well with requested procedures and appeared to put forth his best possible effort. The test results are seen as valid estimations of his cognitive status.    MEASURES ADMINISTERED    The following measures were administered by a trained psychometrist, under my supervision:    Orientation: Time, Place, Basic Personal Information, Recent US Presidents; Wechsler Adult Intelligence Scale-IV: Similarities, Block Design, Digit Span, Coding; Controlled Oral Word Association Test; [place] Naming Test; Jayesh Visual Acuity Screen; Clock Drawing; Trail Making Test; Porteus Maze Test; Edmund-Osterrieth Complex Figure Test; Edmund Auditory Verbal Learning Test; Geriatric Depression Scale.    RESULTS AND INTERPRETATION    Orientation: Orientation was very abnormal for time, with the wrong month, date, year (said 2012), day of the week, and time of day. He was unable to demonstrate orientation to place. Orientation to basic personal information was intact. Orientation to basic cultural information was abnormally low, thinking that the current U.S. President was Obama and the president just before him was Ryan. When asked to name any other presidents from the last 40 years, his best guess was Melissaower.    Intellectual Abilities: Abstract verbal analogical reasoning was on the lower side of average. Visuospatial reasoning through hands-on object assembly was exceptionally low.     Language & Related Skills: Confrontation naming was low average. Letter-based verbal fluency was exceptionally low.    Visual Perceptual & Constructional Skills: Binocular, corrected, near-point visual acuity was 20/50 on Jayesh screening. Clock drawing was very abnormal. Copying a complex geometric figure was exceptionally low, with defective gestalt apprehension and only partially reproducing 4 or 5 (out of 18) elements after close to 6 minutes of trying.    Mental Speed & Executive  Functioning: As noted above, speeded verbal fluency performances were exceptionally low. Cognitive processing speed could not be assessed by a timed transcription task, as he could not get through the practice items. Visual scanning and graphomotor sequencing under simple conditions was exceptionally low. Scanning and sequencing under greater executive demands to control divided attention was so impaired that he could not get past the practice items. Planning, foresight, and using direct feedback to overcome errors were abnormally low on an unspeeded maze-solving task.     Attention & Working Memory: Immediate auditory attention and working memory were below average for repeating and rearranging digit strings.    Learning & Anterograde Memory: Learning a word list over repeated readings was below average. Fee recall of the list was exceptionally low (0 recall) after a delay of about 2 minutes and again after 30 minutes. Delayed recognition of the list was exceptionally low, with more false-positive errors than true hits. A few minutes after the initial copy, incidental free recall of the complex figure was Exceptionally low. After 30 minutes, recall of the figure remained exceptionally low, and recognition of individual figure elements was exceptionally low.     Emotional Functioning: On a brief self-report inventory, he endorsed minimally to borderline elevated symptoms related to depression and anxiety (GDS = 7/30).    IMPRESSIONS AND RECOMMENDATIONS    The neuropsychological results are abnormal. Compared to his baseline in 2019, Mr. Clark demonstrates significant decline. He has two verbally mediated performances today (analogical reasoning, confrontation naming) that remained within the average range is. All other performances today are very abnormal. He continues to demonstrate severe impairments in complex spatial information processing, visual constructional abilities, executive functioning, and mental speed.  His performances on measures of learning and memory are now exceptionally low as well. these findings align with the reported clinical history over the last couple of years, and with cognitive screenings performed in Dr. Victoria s office. Mr. Clark has transitioned from MCI into a state of dementia. There appears to have been an acceleration in the last year. His neuropsychological profile and clinical features (e.g., motoric dyscontrol, REM sleep behaviors, hallucinations, cognitive fluctuations) make Lewy body disease the most likely etiology.    I met with Mr. Clark and his wife after the testing session to discuss my impressions and recommendations.     1. Continued neurologic care and monitoring are needed. I defer to Dr. Victoria on specifics of the treatment plan.  2. There is a substantial element of anosognosia, and recurrent confabulation is apparent along with deficient anterograde memory formation. Mr. Clark demonstrates insufficient cognitive capacity to understand his condition, functional limitations, and treatment needs, and thus has insufficient cognitive capacity to make reliable decisions in his own best interests. His wife tells me that durable meier of  are already set up. She should be relied on for all decision making of consequence. If there is anything lacking in the POA paperwork, an application for guardianship would be appropriate.   3. I agree that he should not be driving. He should not return to driving under any circumstances.  4. I agree that 24/7 oversight and direct management of daily care needs (e.g., medications, nutrition) are necessary. An adult day program would be appropriate. Respite care is necessary. I encourage making connections to the Lewy Body Dementia Association (www.lbda.org) and to a clinical , to aid in planning for the years to come.   5. His wife noted that extended family members do not really understand the situation. I strongly  encourage having family members read the NIH-produced booklet Lewy Body Dementia: Hope Through Research, which can be downloaded as a PDF. It is typically the first result when searching the title online.   6. Regarding his continual confabulation and lack of insight into his situation, I underscore that this is a consequence of neurologic degradation, not an intentional behavior like denial, lying, or deception. The cognitive data indicate cerebral dysfunction that is likely much more prominent in the right hemisphere compared to the left (though bilateral effects are clearly present). One way of conceptualizing broad roles for the hemispheres is that the left hemisphere tends to act like  a storyteller  trying to make purposeful sense of things, while the right hemisphere is more of  a fact-  trying to keep those stories aligned with reality. In this conceptualization, and with today s demonstration of severe deficits in learning and synthesizing new information, the development of ever-shifting confabulation is quite expected. Family members should be advised against trying to force him to see things their way or exhort him over inconsistencies, as that does more harm than good. Additional tips on behavioral management are in the NIH booklet mentioned above.   7. An up-to-date neuropsychological baseline has been obtained. I would be happy to see Mr. Clark again, as clinically indicated.     Rodrigo Wilder, PhD, LP, ABPP-CN  Board Certified in Clinical Neuropsychology  Licensed Psychologist [id number]      Time spent: 44 minutes neurobehavioral status exam including interview, clinical assessment by licensed and board-certified neuropsychologist (CPT 18672, 42818). 139 minutes neuropsychological testing evaluation by licensed and board-certified neuropsychologist, including integration of patient data, interpretation of standardized test results and clinical data, clinical decision-making, treatment planning,  report, and interactive feedback to the patient (CPT 32558, 13813). 125 minutes of psychological and neuropsychological test administration and scoring by technician (CPT 83392, 18989). Diagnoses: G31.83

## 2021-10-01 ENCOUNTER — OFFICE VISIT (OUTPATIENT)
Dept: NEUROLOGY | Facility: CLINIC | Age: 71
End: 2021-10-01
Attending: PSYCHIATRY & NEUROLOGY
Payer: MEDICARE

## 2021-10-01 VITALS — DIASTOLIC BLOOD PRESSURE: 78 MMHG | HEART RATE: 71 BPM | SYSTOLIC BLOOD PRESSURE: 136 MMHG | OXYGEN SATURATION: 94 %

## 2021-10-01 DIAGNOSIS — F02.818 LEWY BODY DEMENTIA WITH BEHAVIORAL DISTURBANCE (H): Primary | ICD-10-CM

## 2021-10-01 DIAGNOSIS — R45.86 MOOD CHANGES: ICD-10-CM

## 2021-10-01 DIAGNOSIS — G31.83 LEWY BODY DEMENTIA WITH BEHAVIORAL DISTURBANCE (H): Primary | ICD-10-CM

## 2021-10-01 PROCEDURE — 99214 OFFICE O/P EST MOD 30 MIN: CPT | Performed by: PSYCHIATRY & NEUROLOGY

## 2021-10-01 PROCEDURE — G0463 HOSPITAL OUTPT CLINIC VISIT: HCPCS

## 2021-10-01 RX ORDER — CITALOPRAM HYDROBROMIDE 40 MG/1
TABLET ORAL
Qty: 91 TABLET | Refills: 1 | Status: SHIPPED | OUTPATIENT
Start: 2021-10-01 | End: 2021-10-21

## 2021-10-01 RX ORDER — RIVASTIGMINE TARTRATE 3 MG/1
3 CAPSULE ORAL 2 TIMES DAILY
Qty: 182 CAPSULE | Refills: 1 | Status: SHIPPED | OUTPATIENT
Start: 2021-10-01

## 2021-10-01 NOTE — LETTER
10/1/2021         RE: David Clark  [address] [address] [facility] [id number]        Dear Colleague,    Thank you for referring your patient, David Clark, to the [facility] [place]. Please see a copy of my visit note below.    ESTABLISHED PATIENT NEUROLOGY NOTE    DATE OF VISIT: 10/1/2021  CLINIC LOCATION: [facility]  MRN: [id number]  PATIENT NAME: David Clark  YOB: 1950    PCP: tSeve Patterson MD    REASON FOR VISIT:   Chief Complaint   Patient presents with     Memory Loss     Memory Review Neuropsych     SUBJECTIVE:                                                      HISTORY OF PRESENT ILLNESS: Patient is here to follow up regarding Lewy body disease.  He was last seen on 7/6/2021.  At that time I encouraged him to complete neuropsychologic evaluation.  Please refer to my initial/other prior notes for further information.  The patient is accompanied by his wife, who participates in the interview today.    Since the last visit, the patient reports that he did not notice any changes in his symptoms.  Tolerates rivastigmine at 1.5 mg twice daily without noticeable side effects.  He thinks that his night sleep is better, but occasionally he still wakes up to use the restroom.  He denies interval development of new focal neurological symptoms.    According to patient's wife, she did not notice any improvement after starting rivastigmine, but he tolerates it well.  She feels that his acting out in sleep improved after increasing melatonin dose.  Anxiety is not well controlled on 30 mg of Celexa daily.  She has no additional concerns.  They plan to spend winter in [place].  Will be back in the spring.    Neuropsychologic evaluation from 9/27/2021 was abnormal, demonstrating significant decline compared to 2019 study.  He has 2 verbally mediated performances (analogical reasoning and confrontation naming) in average range with all other  performances in quite impaired range.  There is severe impairment in complex spatial information processing, visual constructional abilities, executive function, and mental speed, as well as measures of learning and memory.  It felt that he transitioned from mild cognitive impairment to dementia with Lewy body disease being most likely etiology.  No driving and 24/7 supervision were advised.    On review of systems, patient endorses no additional active complaints. Medications, allergies, family and social history were also reviewed. There are no changes reported by patient.  REVIEW OF SYSTEMS:                                                    10-system review was completed. Pertinent positives are included in HPI. The remainder of ROS is negative.  EXAM:                                                    Physical Exam:   Vitals: /78   Pulse 71   SpO2 94%     General: pt is in NAD, cooperative.  Skin: normal turgor, moist mucous membranes, no lesions/rashes noticed.  HEENT: ATNC, white sclera, normal conjunctiva.  Respiratory: Symmetric lung excursion, no accessory respiratory muscle use.  Abdomen: Non distended.  Neurological: awake, cooperative, follows commands, cranial nerves are intact, intermittent bilateral resting hand tremor, no other noticeable exam changes.  ASSESSMENT AND PLAN:                                                    Assessment: 71-year-old male patient with Lewy body dementia presents for follow-up after the completion of neuropsychologic evaluation, which was supportive of his diagnosis.    He is already on 1.5 mg of rivastigmine twice daily, which is tolerated well.  We discussed that we could further increase his dose based on tolerance.  We will increase it to 3 mg twice daily.  We could try adding Namenda later if the patient tolerates.  When parkinsonism progresses, Sinemet trial could be considered.    For his night sleep, we decided to continue melatonin for now, but  discussed that clonazepam at low-dose could be used in the future if necessary.    Regarding his anxiety, we increased Celexa further.    Diagnoses:    ICD-10-CM    1. Lewy body dementia with behavioral disturbance (H)  G31.83 citalopram (CELEXA) 40 MG tablet    F02.81 rivastigmine (EXELON) 3 MG capsule   2. Mood changes  R45.86 citalopram (CELEXA) 40 MG tablet     Plan: At today's visit we thoroughly discussed current symptoms, neuropsychology evaluation results (consistent with Lewy body disease), available treatment options, and the plan.    We decided to increase the dose of rivastigmine and citalopram to 40 mg/day to address anxiety.  Updated prescriptions were sent to requested pharmacy.  The patient and his wife were advised to contact my clinic with any intolerable side effects.    We discussed that he should not drive.    For acting out in sleep, he should continue using melatonin, we discussed the dose range (up to 18 mg per night).  Extended release formulation might be more helpful.  We also discussed the low dose of clonazepam if melatonin is not effective    Next follow-up appointment is in the next 6 months or earlier if needed.    Total Time: 37 minutes spent on the date of the encounter doing chart review, history and exam, documentation and further activities per the note.    Albaro Victoria MD  [facility]  (Chart documentation was completed in part with Dragon voice-recognition software. Even though reviewed, some grammatical, spelling, and word errors may remain.)      Again, thank you for allowing me to participate in the care of your patient.        Sincerely,        Albaro Victoria MD

## 2021-10-01 NOTE — PROGRESS NOTES
ESTABLISHED PATIENT NEUROLOGY NOTE    DATE OF VISIT: 10/1/2021  CLINIC LOCATION: [facility]  MRN: [id number]  PATIENT NAME: David Clark  YOB: 1950    PCP: Steve Patterson MD    REASON FOR VISIT:   Chief Complaint   Patient presents with     Memory Loss     Memory Review Neuropsych     SUBJECTIVE:                                                      HISTORY OF PRESENT ILLNESS: Patient is here to follow up regarding Lewy body disease.  He was last seen on 7/6/2021.  At that time I encouraged him to complete neuropsychologic evaluation.  Please refer to my initial/other prior notes for further information.  The patient is accompanied by his wife, who participates in the interview today.    Since the last visit, the patient reports that he did not notice any changes in his symptoms.  Tolerates rivastigmine at 1.5 mg twice daily without noticeable side effects.  He thinks that his night sleep is better, but occasionally he still wakes up to use the restroom.  He denies interval development of new focal neurological symptoms.    According to patient's wife, she did not notice any improvement after starting rivastigmine, but he tolerates it well.  She feels that his acting out in sleep improved after increasing melatonin dose.  Anxiety is not well controlled on 30 mg of Celexa daily.  She has no additional concerns.  They plan to spend winter in [place].  Will be back in the spring.    Neuropsychologic evaluation from 9/27/2021 was abnormal, demonstrating significant decline compared to 2019 study.  He has 2 verbally mediated performances (analogical reasoning and confrontation naming) in average range with all other performances in quite impaired range.  There is severe impairment in complex spatial information processing, visual constructional abilities, executive function, and mental speed, as well as measures of learning and memory.  It felt that he transitioned from mild  cognitive impairment to dementia with Lewy body disease being most likely etiology.  No driving and 24/7 supervision were advised.    On review of systems, patient endorses no additional active complaints. Medications, allergies, family and social history were also reviewed. There are no changes reported by patient.  REVIEW OF SYSTEMS:                                                    10-system review was completed. Pertinent positives are included in HPI. The remainder of ROS is negative.  EXAM:                                                    Physical Exam:   Vitals: /78   Pulse 71   SpO2 94%     General: pt is in NAD, cooperative.  Skin: normal turgor, moist mucous membranes, no lesions/rashes noticed.  HEENT: ATNC, white sclera, normal conjunctiva.  Respiratory: Symmetric lung excursion, no accessory respiratory muscle use.  Abdomen: Non distended.  Neurological: awake, cooperative, follows commands, cranial nerves are intact, intermittent bilateral resting hand tremor, no other noticeable exam changes.  ASSESSMENT AND PLAN:                                                    Assessment: 71-year-old male patient with Lewy body dementia presents for follow-up after the completion of neuropsychologic evaluation, which was supportive of his diagnosis.    He is already on 1.5 mg of rivastigmine twice daily, which is tolerated well.  We discussed that we could further increase his dose based on tolerance.  We will increase it to 3 mg twice daily.  We could try adding Namenda later if the patient tolerates.  When parkinsonism progresses, Sinemet trial could be considered.    For his night sleep, we decided to continue melatonin for now, but discussed that clonazepam at low-dose could be used in the future if necessary.    Regarding his anxiety, we increased Celexa further.    Diagnoses:    ICD-10-CM    1. Lewy body dementia with behavioral disturbance (H)  G31.83 citalopram (CELEXA) 40 MG tablet    F02.81  rivastigmine (EXELON) 3 MG capsule   2. Mood changes  R45.86 citalopram (CELEXA) 40 MG tablet     Plan: At today's visit we thoroughly discussed current symptoms, neuropsychology evaluation results (consistent with Lewy body disease), available treatment options, and the plan.    We decided to increase the dose of rivastigmine and citalopram to 40 mg/day to address anxiety.  Updated prescriptions were sent to requested pharmacy.  The patient and his wife were advised to contact my clinic with any intolerable side effects.    We discussed that he should not drive.    For acting out in sleep, he should continue using melatonin, we discussed the dose range (up to 18 mg per night).  Extended release formulation might be more helpful.  We also discussed the low dose of clonazepam if melatonin is not effective    Next follow-up appointment is in the next 6 months or earlier if needed.    Total Time: 37 minutes spent on the date of the encounter doing chart review, history and exam, documentation and further activities per the note.    Albaro Victoria MD  [facility]  (Chart documentation was completed in part with Dragon voice-recognition software. Even though reviewed, some grammatical, spelling, and word errors may remain.)

## 2021-10-01 NOTE — PATIENT INSTRUCTIONS
AFTER VISIT SUMMARY (AVS):    At today's visit we thoroughly discussed current symptoms, neuropsychology evaluation results (consistent with Lewy body disease), available treatment options, and the plan.    We decided to increase the dose of rivastigmine for your memory.  Updated prescription was sent to your pharmacy.  Please contact my clinic with any intolerable side effects.    We also decided to increase the dose of citalopram to 40 mg/day to address anxiety.  I updated the prescription.    You should not drive.    For acting out in sleep, please continue using melatonin, we discussed the dose range (up to 18 mg per night).  You might consider using extended release formulation.  We also discussed the low dose of clonazepam if melatonin is not effective    Next follow-up appointment is in the next 6 months or earlier if needed.    Please do not hesitate to call me with any questions or concerns.    Thanks.

## 2021-10-13 ENCOUNTER — TELEPHONE (OUTPATIENT)
Dept: NEUROSURGERY | Facility: CLINIC | Age: 71
End: 2021-10-13

## 2021-10-13 NOTE — TELEPHONE ENCOUNTER
Reason for call: pt's wife Lelia called to ask for a letter be sent regarding her  stating he needs 24 hour care and pt is no longer able to make financial decisons or his own health decisions as well and guardianship be directed to his wife. Please call Lelia back at [phone].

## 2021-10-21 DIAGNOSIS — R45.86 MOOD CHANGES: ICD-10-CM

## 2021-10-21 DIAGNOSIS — F02.818 LEWY BODY DEMENTIA WITH BEHAVIORAL DISTURBANCE (H): ICD-10-CM

## 2021-10-21 DIAGNOSIS — G31.83 LEWY BODY DEMENTIA WITH BEHAVIORAL DISTURBANCE (H): ICD-10-CM

## 2021-10-21 RX ORDER — CITALOPRAM HYDROBROMIDE 40 MG/1
40 TABLET ORAL DAILY
Qty: 91 TABLET | Refills: 1 | Status: SHIPPED | OUTPATIENT
Start: 2021-10-21

## 2021-10-25 NOTE — NURSING NOTE
David RICE Eduardo  [address] [facility] [address]       2021      Dear Mr. Clark,    Your provider has placed an order for you to get a new PAP device. Your medical equipment company should contact you in approximately 1 week to schedule your set up (Please be advised, due to a shortage of machines, this may take longer to receive call). Once you know the date of this appointment, please contact our office to schedule a follow up visit with your provider. This appointment should be scheduled 30-60 days from the day that you will be set up on your new device.      [employer] contact information:     [facility]: [phone]  [place]: [phone]  [place]: [phone]  [phone]  [place]: [phone]  [place]: [phone]        [facility]   Schedule line: [phone]    Sincerely,    Roxanne Jose [facility]  Sleep Medicine  10/25/2021 2:53 PM      
Contraindicated

## 2021-11-07 ENCOUNTER — HEALTH MAINTENANCE LETTER (OUTPATIENT)
Age: 71
End: 2021-11-07

## 2022-02-03 ENCOUNTER — MYC MEDICAL ADVICE (OUTPATIENT)
Dept: UROLOGY | Facility: CLINIC | Age: 72
End: 2022-02-03
Payer: MEDICARE

## 2022-02-03 DIAGNOSIS — R39.15 URINARY URGENCY: Primary | ICD-10-CM

## 2022-02-03 RX ORDER — OXYBUTYNIN CHLORIDE 5 MG/1
5 TABLET ORAL 3 TIMES DAILY
Qty: 270 TABLET | Refills: 3 | Status: SHIPPED | OUTPATIENT
Start: 2022-02-03

## 2022-02-03 NOTE — TELEPHONE ENCOUNTER
Requested an alternative to mirabegron because of cost.    Originally was taking oxybutynin but a concern was raised with a positive cognitive impact of this medication.    Mirabegron is possibly less of an issue cognitively.    All of the other potential substitutes are essentially in the same class as oxybutynin.    Reordered oxybutynin 5 mg 3 times daily.

## 2022-11-19 ENCOUNTER — HEALTH MAINTENANCE LETTER (OUTPATIENT)
Age: 72
End: 2022-11-19

## 2024-01-28 ENCOUNTER — HEALTH MAINTENANCE LETTER (OUTPATIENT)
Age: 74
End: 2024-01-28